# Patient Record
Sex: MALE | Race: WHITE | NOT HISPANIC OR LATINO | Employment: STUDENT | URBAN - METROPOLITAN AREA
[De-identification: names, ages, dates, MRNs, and addresses within clinical notes are randomized per-mention and may not be internally consistent; named-entity substitution may affect disease eponyms.]

---
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
## 2018-02-14 ENCOUNTER — OFFICE VISIT (OUTPATIENT)
Dept: INTERNAL MEDICINE | Facility: CLINIC | Age: 18
End: 2018-02-14
Payer: COMMERCIAL

## 2018-02-14 ENCOUNTER — LAB VISIT (OUTPATIENT)
Dept: LAB | Facility: HOSPITAL | Age: 18
End: 2018-02-14
Attending: FAMILY MEDICINE
Payer: COMMERCIAL

## 2018-02-14 VITALS
HEIGHT: 74 IN | HEART RATE: 73 BPM | SYSTOLIC BLOOD PRESSURE: 118 MMHG | BODY MASS INDEX: 24.58 KG/M2 | TEMPERATURE: 98 F | OXYGEN SATURATION: 99 % | WEIGHT: 191.56 LBS | DIASTOLIC BLOOD PRESSURE: 80 MMHG

## 2018-02-14 DIAGNOSIS — Z00.00 ROUTINE GENERAL MEDICAL EXAMINATION AT A HEALTH CARE FACILITY: Primary | ICD-10-CM

## 2018-02-14 DIAGNOSIS — Z00.00 ROUTINE GENERAL MEDICAL EXAMINATION AT A HEALTH CARE FACILITY: ICD-10-CM

## 2018-02-14 DIAGNOSIS — N50.89 TESTICULAR SWELLING, RIGHT: ICD-10-CM

## 2018-02-14 LAB
ALBUMIN SERPL BCP-MCNC: 4.4 G/DL
ALP SERPL-CCNC: 111 U/L
ALT SERPL W/O P-5'-P-CCNC: 26 U/L
ANION GAP SERPL CALC-SCNC: 8 MMOL/L
AST SERPL-CCNC: 24 U/L
BASOPHILS # BLD AUTO: 0.01 K/UL
BASOPHILS NFR BLD: 0.2 %
BILIRUB SERPL-MCNC: 1.7 MG/DL
BUN SERPL-MCNC: 14 MG/DL
CALCIUM SERPL-MCNC: 9.5 MG/DL
CHLORIDE SERPL-SCNC: 103 MMOL/L
CHOLEST SERPL-MCNC: 202 MG/DL
CHOLEST/HDLC SERPL: 4.2 {RATIO}
CO2 SERPL-SCNC: 28 MMOL/L
CREAT SERPL-MCNC: 0.9 MG/DL
DIFFERENTIAL METHOD: ABNORMAL
EOSINOPHIL # BLD AUTO: 0.1 K/UL
EOSINOPHIL NFR BLD: 1.5 %
ERYTHROCYTE [DISTWIDTH] IN BLOOD BY AUTOMATED COUNT: 12.4 %
EST. GFR  (AFRICAN AMERICAN): >60 ML/MIN/1.73 M^2
EST. GFR  (NON AFRICAN AMERICAN): >60 ML/MIN/1.73 M^2
GLUCOSE SERPL-MCNC: 88 MG/DL
HCT VFR BLD AUTO: 49.5 %
HDLC SERPL-MCNC: 48 MG/DL
HDLC SERPL: 23.8 %
HGB BLD-MCNC: 17.1 G/DL
IMM GRANULOCYTES # BLD AUTO: 0.02 K/UL
IMM GRANULOCYTES NFR BLD AUTO: 0.4 %
LDLC SERPL CALC-MCNC: 137.2 MG/DL
LYMPHOCYTES # BLD AUTO: 1.6 K/UL
LYMPHOCYTES NFR BLD: 30.8 %
MCH RBC QN AUTO: 29.8 PG
MCHC RBC AUTO-ENTMCNC: 34.5 G/DL
MCV RBC AUTO: 86 FL
MONOCYTES # BLD AUTO: 0.5 K/UL
MONOCYTES NFR BLD: 8.6 %
NEUTROPHILS # BLD AUTO: 3.1 K/UL
NEUTROPHILS NFR BLD: 58.5 %
NONHDLC SERPL-MCNC: 154 MG/DL
NRBC BLD-RTO: 0 /100 WBC
PLATELET # BLD AUTO: 201 K/UL
PMV BLD AUTO: 9 FL
POTASSIUM SERPL-SCNC: 4.2 MMOL/L
PROT SERPL-MCNC: 7.4 G/DL
RBC # BLD AUTO: 5.73 M/UL
SODIUM SERPL-SCNC: 139 MMOL/L
TRIGL SERPL-MCNC: 84 MG/DL
WBC # BLD AUTO: 5.23 K/UL

## 2018-02-14 PROCEDURE — 90471 IMMUNIZATION ADMIN: CPT | Mod: S$GLB,,, | Performed by: FAMILY MEDICINE

## 2018-02-14 PROCEDURE — 99385 PREV VISIT NEW AGE 18-39: CPT | Mod: 25,S$GLB,, | Performed by: FAMILY MEDICINE

## 2018-02-14 PROCEDURE — 90686 IIV4 VACC NO PRSV 0.5 ML IM: CPT | Mod: S$GLB,,, | Performed by: FAMILY MEDICINE

## 2018-02-14 PROCEDURE — 85025 COMPLETE CBC W/AUTO DIFF WBC: CPT

## 2018-02-14 PROCEDURE — 99999 PR PBB SHADOW E&M-EST. PATIENT-LVL III: CPT | Mod: PBBFAC,,, | Performed by: FAMILY MEDICINE

## 2018-02-14 PROCEDURE — 80061 LIPID PANEL: CPT

## 2018-02-14 PROCEDURE — 80053 COMPREHEN METABOLIC PANEL: CPT

## 2018-02-14 PROCEDURE — 36415 COLL VENOUS BLD VENIPUNCTURE: CPT | Mod: PO

## 2018-02-14 NOTE — PROGRESS NOTES
"Subjective:       Patient ID: Olvin Posey is a 17 y.o. male.    Chief Complaint: Follow-up    17-year-old male patient new to my clinic here to establish care.  Patient with no significant past medical history reports that he is here for routine annual physicals.  Patient mentioned that he is been noticing a lump to his right testicle, which has not increased in size, no discomfort reported, has been there for the past few months and would like to get checked.   Patient denies of any discomfort with urine or abdominal pain or groin discomfort  Has been staying physically active        Review of Systems   Constitutional: Negative for appetite change and fatigue.   Eyes: Negative for visual disturbance.   Respiratory: Negative for shortness of breath.    Cardiovascular: Negative for chest pain, palpitations and leg swelling.   Gastrointestinal: Negative for abdominal pain, nausea and vomiting.   Genitourinary: Negative for decreased urine volume, difficulty urinating, dysuria, scrotal swelling and testicular pain.   Musculoskeletal: Negative for myalgias.   Skin: Negative for rash.   Neurological: Negative for headaches.   Psychiatric/Behavioral: Negative for sleep disturbance.         /80 (BP Location: Left arm, Patient Position: Sitting)   Pulse 73   Temp 97.6 °F (36.4 °C) (Tympanic)   Ht 6' 2" (1.88 m)   Wt 86.9 kg (191 lb 9.3 oz)   SpO2 99%   BMI 24.60 kg/m²   Objective:      Physical Exam   Constitutional: He is oriented to person, place, and time. He appears well-developed and well-nourished.   HENT:   Head: Normocephalic and atraumatic.   Mouth/Throat: Oropharynx is clear and moist.   Cardiovascular: Normal rate, regular rhythm and normal heart sounds.    No murmur heard.  Pulmonary/Chest: Effort normal and breath sounds normal. He has no wheezes.   Abdominal: Soft. Bowel sounds are normal. There is no tenderness.   Genitourinary:   Genitourinary Comments: Tiny lump noted in the right " testicle on exam today but nontender   Musculoskeletal: He exhibits no edema.   Neurological: He is alert and oriented to person, place, and time.   Skin: Skin is warm and dry. No rash noted.   Psychiatric: He has a normal mood and affect.         Assessment:       1. Routine general medical examination at a health care facility    2. Testicular swelling, right        Plan:   Routine general medical examination at a health care facility  -     CBC auto differential; Future; Expected date: 02/14/2018  -     Comprehensive metabolic panel; Future; Expected date: 02/14/2018  -     Lipid panel; Future; Expected date: 02/14/2018  -     Urinalysis; Future; Expected date: 02/14/2018  Vital signs stable today.  Clinical exam stable.  Will check nonfasting labs today  Encouraged to eat enough*modifications with diet and exercise    Testicular swelling, right  -     US Scrotum And Testicles; Future; Expected date: 02/14/2018  Secondary to lump in the right testicle, will get ultrasound of the scrotum and testicles to take into further evaluation.  If any increase in size or groin discomfort of testicular pain, please discuss further with urology  Patient clinically asymptomatic    Other orders  -     Influenza - Quadrivalent (3 years & older) (PF)  Flu shot given today

## 2018-02-19 ENCOUNTER — HOSPITAL ENCOUNTER (OUTPATIENT)
Dept: RADIOLOGY | Facility: HOSPITAL | Age: 18
Discharge: HOME | End: 2018-02-19
Attending: FAMILY MEDICINE
Payer: COMMERCIAL

## 2018-02-19 ENCOUNTER — TELEPHONE (OUTPATIENT)
Dept: INTERNAL MEDICINE | Facility: CLINIC | Age: 18
End: 2018-02-19

## 2018-02-19 DIAGNOSIS — N50.89 TESTICULAR SWELLING, RIGHT: ICD-10-CM

## 2018-02-19 DIAGNOSIS — N43.3 HYDROCELE, UNSPECIFIED HYDROCELE TYPE: Primary | ICD-10-CM

## 2018-02-19 PROCEDURE — 76870 US EXAM SCROTUM: CPT | Mod: 26,,, | Performed by: RADIOLOGY

## 2018-02-19 PROCEDURE — 76870 US EXAM SCROTUM: CPT | Mod: TC,PO

## 2018-02-19 NOTE — TELEPHONE ENCOUNTER
Scrotum ultrasound shows trace bilateral hydrocele, will refer to urology for further evaluation.  No other acute findings noted

## 2018-03-27 ENCOUNTER — OFFICE VISIT (OUTPATIENT)
Dept: UROLOGY | Facility: CLINIC | Age: 18
End: 2018-03-27
Payer: COMMERCIAL

## 2018-03-27 VITALS
WEIGHT: 193.56 LBS | DIASTOLIC BLOOD PRESSURE: 84 MMHG | BODY MASS INDEX: 24.84 KG/M2 | HEART RATE: 82 BPM | SYSTOLIC BLOOD PRESSURE: 116 MMHG | HEIGHT: 74 IN

## 2018-03-27 DIAGNOSIS — N43.3 HYDROCELE, UNSPECIFIED HYDROCELE TYPE: Primary | ICD-10-CM

## 2018-03-27 LAB
BILIRUB SERPL-MCNC: NORMAL MG/DL
BLOOD URINE, POC: NORMAL
COLOR, POC UA: YELLOW
GLUCOSE UR QL STRIP: NORMAL
KETONES UR QL STRIP: NORMAL
LEUKOCYTE ESTERASE URINE, POC: NORMAL
NITRITE, POC UA: NORMAL
PH, POC UA: 7
PROTEIN, POC: NORMAL
SPECIFIC GRAVITY, POC UA: 1.01
UROBILINOGEN, POC UA: NORMAL

## 2018-03-27 PROCEDURE — 99244 OFF/OP CNSLTJ NEW/EST MOD 40: CPT | Mod: 25,S$GLB,, | Performed by: UROLOGY

## 2018-03-27 PROCEDURE — 99999 PR PBB SHADOW E&M-EST. PATIENT-LVL II: CPT | Mod: PBBFAC,,, | Performed by: UROLOGY

## 2018-03-27 PROCEDURE — 81002 URINALYSIS NONAUTO W/O SCOPE: CPT | Mod: S$GLB,,, | Performed by: UROLOGY

## 2018-03-27 NOTE — PROGRESS NOTES
Chief Complaint: Hydrocele    HPI:   3/27/18: 16 yo man referred by Dr. Verdin for hydrocele.  Has felt this on his right testicle since 6 mo ago, not really changing over that interval.  No pain.  No abd/pelvic pain and no exac/rel factors.  No hematuria.  No urolithiasis.  No urinary bother.  No  history.  Normal sexual function.    Allergies:  Patient has no known allergies.    Medications:  currently has no medications in their medication list.    Review of Systems:  General: No fever, chills, fatigability, or weight loss.  Skin: No rashes, itching, or changes in color or texture of skin.  Chest: Denies MCCLELLAND, cyanosis, wheezing, cough, and sputum production.  Abdomen: Appetite fine. No weight loss. Denies diarrhea, abdominal pain, hematemesis, or blood in stool.  Musculoskeletal: No joint stiffness or swelling. Denies back pain.  : As above.  All other review of systems negative.    PMH:   has no past medical history on file.    PSH:   has a past surgical history that includes Inguinal hernia repair and Adenoidectomy.    FamHx: family history includes Asthma in his mother; Depression in his father; Heart disease in his maternal grandfather and maternal grandmother; Hypertension in his maternal grandfather and maternal grandmother; Seizures in his other; Stroke in his maternal grandfather; Thyroid disease (age of onset: 40) in his mother.    SocHx:  reports that he has never smoked. He has never used smokeless tobacco. He reports that he drinks alcohol. He reports that he uses drugs, including Marijuana.      Physical Exam:  Vitals:    03/27/18 1524   BP: 116/84   Pulse: 82     General: A&Ox3, no apparent distress, no deformities  Neck: No masses, normal thyroid  Lungs: normal inspiration, no use of accessory muscles  Heart: normal pulse, no arrhythmias  Abdomen: Soft, NT, ND, no masses, no hernias, no hepatosplenomegaly  Lymphatic: Neck and groin nodes negative  Skin: The skin is warm and dry. No  jaundice.  Ext: No c/c/e.  : Test desc ann marie, no abnormalities of epididymus. Penis normal, with normal penile and scrotal skin. Meatus normal.     Labs/Studies: Urinalysis performed in clinic, summary: UA normal    Impression/Plan:   1. Reassured.  US and PE normal.  RTC prn.

## 2018-03-27 NOTE — LETTER
March 27, 2018      Sierra Verdin MD  [address] [facility] [address]           [facility]  [address] [place] [address]  [place] [address]  Phone: [phone]  Fax: [phone]          Patient: Olvin Posey   MR Number: [id number]   YOB: 2000   Date of Visit: 3/27/2018       Dear Dr. Sierra Verdin:    Thank you for referring Olvin Posey to me for evaluation. Attached you will find relevant portions of my assessment and plan of care.    If you have questions, please do not hesitate to call me. I look forward to following Olvin Posey along with you.    Sincerely,    Murray Glynn IV, MD    Enclosure  CC:  No Recipients    If you would like to receive this communication electronically, please contact [email or web address] or [phone] to request more information on [employer] access.    For providers and/or their staff who would like to refer a patient to [facility], please contact us through our one-stop-shop provider referral line, [facility], at [phone].    If you feel you have received this communication in error or would no longer like to receive these types of communications, please e-mail [email or web address]

## 2018-06-23 ENCOUNTER — PATIENT MESSAGE (OUTPATIENT)
Dept: UROLOGY | Facility: CLINIC | Age: 18
End: 2018-06-23

## 2018-07-16 ENCOUNTER — PATIENT MESSAGE (OUTPATIENT)
Dept: INTERNAL MEDICINE | Facility: CLINIC | Age: 18
End: 2018-07-16

## 2018-08-03 ENCOUNTER — TELEPHONE (OUTPATIENT)
Dept: INTERNAL MEDICINE | Facility: CLINIC | Age: 18
End: 2018-08-03

## 2018-08-03 NOTE — TELEPHONE ENCOUNTER
Called pt regarding paperwork, no answer but left a message letting him know to call us back if he would like to pick it up or have us fax it. FERDINAND

## 2018-10-17 ENCOUNTER — LAB VISIT (OUTPATIENT)
Dept: LAB | Facility: HOSPITAL | Age: 18
End: 2018-10-17
Attending: FAMILY MEDICINE
Payer: COMMERCIAL

## 2018-10-17 ENCOUNTER — OFFICE VISIT (OUTPATIENT)
Dept: INTERNAL MEDICINE | Facility: CLINIC | Age: 18
End: 2018-10-17
Payer: COMMERCIAL

## 2018-10-17 VITALS
DIASTOLIC BLOOD PRESSURE: 88 MMHG | WEIGHT: 179.44 LBS | HEART RATE: 97 BPM | TEMPERATURE: 98 F | SYSTOLIC BLOOD PRESSURE: 110 MMHG | BODY MASS INDEX: 23.03 KG/M2 | HEIGHT: 74 IN | OXYGEN SATURATION: 99 %

## 2018-10-17 DIAGNOSIS — R21 PERIANAL RASH: Primary | ICD-10-CM

## 2018-10-17 DIAGNOSIS — R21 PERIANAL RASH: ICD-10-CM

## 2018-10-17 DIAGNOSIS — L65.9 HAIR LOSS: ICD-10-CM

## 2018-10-17 PROCEDURE — 85025 COMPLETE CBC W/AUTO DIFF WBC: CPT

## 2018-10-17 PROCEDURE — 99214 OFFICE O/P EST MOD 30 MIN: CPT | Mod: 25,S$GLB,, | Performed by: FAMILY MEDICINE

## 2018-10-17 PROCEDURE — 90686 IIV4 VACC NO PRSV 0.5 ML IM: CPT | Mod: S$GLB,,, | Performed by: FAMILY MEDICINE

## 2018-10-17 PROCEDURE — 84443 ASSAY THYROID STIM HORMONE: CPT

## 2018-10-17 PROCEDURE — 90471 IMMUNIZATION ADMIN: CPT | Mod: S$GLB,,, | Performed by: FAMILY MEDICINE

## 2018-10-17 PROCEDURE — 36415 COLL VENOUS BLD VENIPUNCTURE: CPT | Mod: PO

## 2018-10-17 PROCEDURE — 99999 PR PBB SHADOW E&M-EST. PATIENT-LVL IV: CPT | Mod: PBBFAC,,, | Performed by: FAMILY MEDICINE

## 2018-10-17 PROCEDURE — 3008F BODY MASS INDEX DOCD: CPT | Mod: CPTII,S$GLB,, | Performed by: FAMILY MEDICINE

## 2018-10-17 RX ORDER — MUPIROCIN 20 MG/G
OINTMENT TOPICAL 2 TIMES DAILY
Qty: 15 G | Refills: 0 | Status: SHIPPED | OUTPATIENT
Start: 2018-10-17 | End: 2020-08-06

## 2018-10-17 RX ORDER — TRIAMCINOLONE ACETONIDE 5 MG/G
CREAM TOPICAL 2 TIMES DAILY
Qty: 15 G | Refills: 0 | Status: SHIPPED | OUTPATIENT
Start: 2018-10-17 | End: 2020-08-06

## 2018-10-17 NOTE — PROGRESS NOTES
Subjective:       Patient ID: Olvin Posey is a 18 y.o. male.    Chief Complaint: Rash (bottom) and Hair Loss    18-year-old male patient with no significant past medical history here concerned about rash in his private area, to his buttocks off and on lately for the past 1 year and had STD workup in the past year ago which was.  Normal.  Patient has been sexually active with 1 partner.   Denies any penile discharge, Has been having occasional itching.   Patient also concerned about hair loss lately  Has been using head and shoulders and yomi shampoo  Reports minimal stress at school lately        Rash   This is a chronic problem. The current episode started more than 1 year ago. The problem has been waxing and waning since onset. The affected locations include thegroin, genitalia and right buttock. The rash is characterized by dryness, itchiness and peeling. He was exposed to nothing. Pertinent negatives include no anorexia, congestion, cough, diarrhea, eye pain, facial edema, fatigue, fever, joint pain, nail changes, rhinorrhea, shortness of breath, sore throat or vomiting. Past treatments include anti-itch cream and moisturizer. The treatment provided mild relief. There is no history of allergies, asthma, eczema or varicella.     Review of Systems   Constitutional: Negative for appetite change, fatigue and fever.   HENT: Negative for congestion, rhinorrhea and sore throat.    Eyes: Negative for pain and visual disturbance.   Respiratory: Negative for cough and shortness of breath.    Cardiovascular: Negative for chest pain, palpitations and leg swelling.   Gastrointestinal: Negative for abdominal pain, anorexia, diarrhea, nausea and vomiting.   Genitourinary: Negative for discharge and penile pain.   Musculoskeletal: Negative for joint pain and myalgias.   Skin: Positive for rash. Negative for nail changes.        Hair loss   Neurological: Negative for headaches.   Psychiatric/Behavioral: Negative for  "dysphoric mood and sleep disturbance. The patient is nervous/anxious.          /88 (BP Location: Right arm, Patient Position: Sitting)   Pulse 97   Temp 98.3 °F (36.8 °C) (Tympanic)   Ht 6' 2" (1.88 m)   Wt 81.4 kg (179 lb 7.3 oz)   SpO2 99%   BMI 23.04 kg/m²   Objective:      Physical Exam   Constitutional: He is oriented to person, place, and time. He appears well-developed and well-nourished.   HENT:   Head: Normocephalic and atraumatic.   Mouth/Throat: Oropharynx is clear and moist.   Cardiovascular: Normal rate, regular rhythm and normal heart sounds.   No murmur heard.  Pulmonary/Chest: Effort normal and breath sounds normal. He has no wheezes.   Abdominal: Soft. Bowel sounds are normal. There is no tenderness.   Musculoskeletal: He exhibits no edema.   Neurological: He is alert and oriented to person, place, and time.   Skin: Skin is warm and dry. No rash noted.        Noted erythematous scattered maculopapular rash  to buttocks and perianal area    Noted receding hairline on the right side   Psychiatric: He has a normal mood and affect.         Assessment:       1. Perianal rash    2. Hair loss        Plan:   Perianal rash  -     CBC auto differential; Future; Expected date: 10/17/2018  -     TSH; Future; Expected date: 10/17/2018  -     Ambulatory referral to Dermatology  -     mupirocin (BACTROBAN) 2 % ointment; Apply topically 2 (two) times daily. Apply to anal rash  Dispense: 15 g; Refill: 0  -     triamcinolone acetonide 0.5% (KENALOG) 0.5 % Crea; Apply topically 2 (two) times daily. Apply to anal rash for 10 days  Dispense: 15 g; Refill: 0  Patient was advised to alternate Bactroban and Kenalog cream  Offered STD workup but patient refused at this time, he mentions that he has been worked up by a year ago while he was having this rash which was normal  Will refer to Dermatology  Advised to use unscented soaps and lotions    Hair loss  -     CBC auto differential; Future; Expected date: " 10/17/2018  -     TSH; Future; Expected date: 10/17/2018  -     Ambulatory referral to Dermatology  Will check further labs to rule out endocrine etiology  Likely secondary to stress  Encouraged to start taking biotin supplements and use  sulfate free shampoo  Will refer to Dermatology    Other orders  -     Influenza - Quadrivalent (3 years & older) (PF)  Flu shot given today

## 2018-10-18 LAB
BASOPHILS # BLD AUTO: 0.04 K/UL
BASOPHILS NFR BLD: 0.6 %
DIFFERENTIAL METHOD: ABNORMAL
EOSINOPHIL # BLD AUTO: 0.1 K/UL
EOSINOPHIL NFR BLD: 1.5 %
ERYTHROCYTE [DISTWIDTH] IN BLOOD BY AUTOMATED COUNT: 12.8 %
HCT VFR BLD AUTO: 48.8 %
HGB BLD-MCNC: 16.6 G/DL
IMM GRANULOCYTES # BLD AUTO: 0.29 K/UL
IMM GRANULOCYTES NFR BLD AUTO: 4.7 %
LYMPHOCYTES # BLD AUTO: 1.9 K/UL
LYMPHOCYTES NFR BLD: 30.4 %
MCH RBC QN AUTO: 29.8 PG
MCHC RBC AUTO-ENTMCNC: 34 G/DL
MCV RBC AUTO: 88 FL
MONOCYTES # BLD AUTO: 0.5 K/UL
MONOCYTES NFR BLD: 8.4 %
NEUTROPHILS # BLD AUTO: 3.4 K/UL
NEUTROPHILS NFR BLD: 54.4 %
NRBC BLD-RTO: 0 /100 WBC
PLATELET # BLD AUTO: 195 K/UL
PMV BLD AUTO: 9.3 FL
RBC # BLD AUTO: 5.57 M/UL
TSH SERPL DL<=0.005 MIU/L-ACNC: 1.08 UIU/ML
WBC # BLD AUTO: 6.18 K/UL

## 2018-10-19 ENCOUNTER — INITIAL CONSULT (OUTPATIENT)
Dept: DERMATOLOGY | Facility: CLINIC | Age: 18
End: 2018-10-19
Payer: COMMERCIAL

## 2018-10-19 DIAGNOSIS — L30.4 INTERTRIGO: ICD-10-CM

## 2018-10-19 DIAGNOSIS — R23.8 SCALP IRRITATION: Primary | ICD-10-CM

## 2018-10-19 PROCEDURE — 99999 PR PBB SHADOW E&M-EST. PATIENT-LVL III: CPT | Mod: PBBFAC,,, | Performed by: STUDENT IN AN ORGANIZED HEALTH CARE EDUCATION/TRAINING PROGRAM

## 2018-10-19 PROCEDURE — 99202 OFFICE O/P NEW SF 15 MIN: CPT | Mod: S$GLB,,, | Performed by: STUDENT IN AN ORGANIZED HEALTH CARE EDUCATION/TRAINING PROGRAM

## 2018-10-19 RX ORDER — MICONAZOLE NITRATE 2 %
POWDER (GRAM) TOPICAL
Qty: 85 G | Refills: 0 | Status: SHIPPED | OUTPATIENT
Start: 2018-10-19 | End: 2020-08-06

## 2018-10-19 RX ORDER — KETOCONAZOLE 20 MG/ML
SHAMPOO, SUSPENSION TOPICAL
Qty: 120 ML | Refills: 5 | Status: SHIPPED | OUTPATIENT
Start: 2018-10-19 | End: 2020-08-06

## 2018-10-19 RX ORDER — KETOCONAZOLE 20 MG/G
CREAM TOPICAL 2 TIMES DAILY
Qty: 60 G | Refills: 3 | Status: SHIPPED | OUTPATIENT
Start: 2018-10-19 | End: 2018-11-30 | Stop reason: SDUPTHER

## 2018-10-19 RX ORDER — FLUCONAZOLE 200 MG/1
TABLET ORAL
Qty: 4 TABLET | Refills: 0 | Status: SHIPPED | OUTPATIENT
Start: 2018-10-19 | End: 2020-08-06

## 2018-10-19 RX ORDER — CLOBETASOL PROPIONATE 0.46 MG/ML
SOLUTION TOPICAL 2 TIMES DAILY
Qty: 50 ML | Refills: 2 | Status: SHIPPED | OUTPATIENT
Start: 2018-10-19 | End: 2020-08-06

## 2018-10-19 NOTE — LETTER
October 19, 2018      Sierra Verdin MD  [address] [facility] [place] [address]           [facility]  [address] [facility] [address] [facility] [place] [address]  Phone: [phone]  Fax: [phone]          Patient: Olvin Posey   MR Number: [id number]   YOB: 2000   Date of Visit: 10/19/2018       Dear Dr. Sierra Verdin:    Thank you for referring Olvin Posey to me for evaluation. Attached you will find relevant portions of my assessment and plan of care.    If you have questions, please do not hesitate to call me. I look forward to following Olvin Posey along with you.    Sincerely,    Ramu Ferraro MD    Enclosure  CC:  No Recipients    If you would like to receive this communication electronically, please contact [email or web address] or [phone] to request more information on [employer] Link access.    For providers and/or their staff who would like to refer a patient to [facility], please contact us through our one-stop-shop provider referral line, [facility], at [phone].    If you feel you have received this communication in error or would no longer like to receive these types of communications, please e-mail [email or web address]

## 2018-10-19 NOTE — PROGRESS NOTES
Subjective:       Patient ID:  Olvin Posey is a 18 y.o. male who presents for   Chief Complaint   Patient presents with    Rash     c/o rash to right buttocks x 2 years thats dry and itchy tx mupirocin     Hair/Scalp Problem     c/o hair thinning and hair loss  that itch at times x 8 months no tx      History of Present Illness: The patient presents with chief complaint of rash. On the buttocks it has been present for 2 years and waxes and wanes. He has tried OTC fungal cream. He reports that it is very itchy.   Location: inner buttocks  Duration: 1-2 years  Signs/Symptoms: itch  Prior treatments: tx for rash mupirocin     Patient complains of lesion(s) - hair irritation and thinning  Location: scalp  Duration: months  Symptoms: itching and flaking and thinning. He also subconsciously pulls some hairs out when stressed.   Relieving factors/Previous treatments: He has tried head and shoulders.            Review of Systems   Skin: Positive for itching, rash and dry skin.        Objective:    Physical Exam   Constitutional: He appears well-developed and well-nourished. No distress.   Neurological: He is alert and oriented to person, place, and time. He is not disoriented.   Psychiatric: He has a normal mood and affect.   Skin:   Areas Examined (abnormalities noted in diagram):   Scalp / Hair Palpated and Inspected  Head / Face Inspection Performed  Neck Inspection Performed  Genitals / Buttocks / Groin Inspection Performed  RLE Inspected  LLE Inspection Performed                   Diagram Legend     Erythematous scaling macule/papule c/w actinic keratosis       Vascular papule c/w angioma      Pigmented verrucoid papule/plaque c/w seborrheic keratosis      Yellow umbilicated papule c/w sebaceous hyperplasia      Irregularly shaped tan macule c/w lentigo     1-2 mm smooth white papules consistent with Milia      Movable subcutaneous cyst with punctum c/w epidermal inclusion cyst      Subcutaneous movable  cyst c/w pilar cyst      Firm pink to brown papule c/w dermatofibroma      Pedunculated fleshy papule(s) c/w skin tag(s)      Evenly pigmented macule c/w junctional nevus     Mildly variegated pigmented, slightly irregular-bordered macule c/w mildly atypical nevus      Flesh colored to evenly pigmented papule c/w intradermal nevus       Pink pearly papule/plaque c/w basal cell carcinoma      Erythematous hyperkeratotic cursted plaque c/w SCC      Surgical scar with no sign of skin cancer recurrence      Open and closed comedones      Inflammatory papules and pustules      Verrucoid papule consistent consistent with wart     Erythematous eczematous patches and plaques     Dystrophic onycholytic nail with subungual debris c/w onychomycosis     Umbilicated papule    Erythematous-base heme-crusted tan verrucoid plaque consistent with inflamed seborrheic keratosis     Erythematous Silvery Scaling Plaque c/w Psoriasis     See annotation      Assessment / Plan:        Scalp irritation  -     ketoconazole (NIZORAL) 2 % shampoo; Apply topically 3 (three) times a week. Allow to sit on scalp for 5 mins before rinsing.  Dispense: 120 mL; Refill: 5  -     clobetasol (TEMOVATE) 0.05 % external solution; Apply topically 2 (two) times daily. Apply to scalp only  Dispense: 50 mL; Refill: 2    Intertrigo  -     ketoconazole (NIZORAL) 2 % cream; Apply topically 2 (two) times daily. Apply to groin  Dispense: 60 g; Refill: 3  -     fluconazole (DIFLUCAN) 200 MG Tab; Take 1 tablet by mouth once weekly.  Dispense: 4 tablet; Refill: 0  -     miconazole NITRATE 2 % (ZEASORB AF) 2 % top powder; Apply topically as needed for Itching. Apply to groin  Dispense: 85 g; Refill: 0         Follow-up in about 6 weeks (around 11/30/2018).

## 2018-10-19 NOTE — PATIENT INSTRUCTIONS
Ketoconazole shampoo  What is this medicine?  KETOCONAZOLE (janet toe MEHUL na zole) is an antifungal medicine. It is used to treat certain kinds of fungal or yeast infections.  How should I use this medicine?  This medicine is for external use only. Follow the directions on the prescription label. Wash your hands before and after use. Apply the shampoo to damp skin of the affected area of the skin or scalp. Use enough shampoo to cover the affected area and a wide margin surrounding the affected area. Work the shampoo into a lather and leave on for 5 minutes. Rinse the treated area completely with water. Do not get the shampoo in your eyes. If you do, rinse out with plenty of cool tap water. Finish the full course prescribed by your doctor or health care professional even if you think your condition is better. Do not stop using except on the advice of your doctor or health care professional.  Talk to your pediatrician regarding the use of this medicine in children. Special care may be needed.  What side effects may I notice from receiving this medicine?  Side effects that you should report to your doctor or health care professional as soon as possible:  · allergic reactions like skin rash, itching or hives, swelling of the face, lips, or tongue  · pain, tingling, numbness  Side effects that usually do not require medical attention (report to your doctor or health care professional if they continue or are bothersome):  · dry skin  · hair loss, hair discoloration or abnormal texture  · skin irritation  What may interact with this medicine?  Interactions are not expected. Do not use any other skin products without telling your doctor or health care professional.  What if I miss a dose?  If you miss a dose, use it as soon as you can. If it is almost time for your next dose, use only that dose. Do not use double or extra doses.  Where should I keep my medicine?  Keep out of the reach of children.  Store at room temperature  below 25 degrees C (77 degrees F). Protect from light. Keep container tightly closed. Throw away any unused medicine after the expiration date.  What should I tell my health care provider before I take this medicine?  They need to know if you have any of these conditions:  · an unusual or allergic reaction to ketoconazole, itraconazole, miconazole, sulfites, other foods, dyes or preservatives  · pregnant or trying to get pregnant  · breast-feeding  What should I watch for while using this medicine?  Tell your doctor or health care professional if your symptoms do not begin to improve in 1 to 2 weeks.  If your hair has been permanently waved the shampoo may remove the curl.  NOTE:This sheet is a summary. It may not cover all possible information. If you have questions about this medicine, talk to your doctor, pharmacist, or health care provider. Copyright© 2017 [employer]

## 2018-10-31 ENCOUNTER — PATIENT MESSAGE (OUTPATIENT)
Dept: DERMATOLOGY | Facility: CLINIC | Age: 18
End: 2018-10-31

## 2018-11-30 ENCOUNTER — OFFICE VISIT (OUTPATIENT)
Dept: DERMATOLOGY | Facility: CLINIC | Age: 18
End: 2018-11-30
Payer: COMMERCIAL

## 2018-11-30 DIAGNOSIS — L30.4 INTERTRIGO: Primary | ICD-10-CM

## 2018-11-30 PROCEDURE — 99999 PR PBB SHADOW E&M-EST. PATIENT-LVL II: CPT | Mod: PBBFAC,,, | Performed by: STUDENT IN AN ORGANIZED HEALTH CARE EDUCATION/TRAINING PROGRAM

## 2018-11-30 PROCEDURE — 99212 OFFICE O/P EST SF 10 MIN: CPT | Mod: S$GLB,,, | Performed by: STUDENT IN AN ORGANIZED HEALTH CARE EDUCATION/TRAINING PROGRAM

## 2018-11-30 RX ORDER — KETOCONAZOLE 20 MG/G
CREAM TOPICAL 2 TIMES DAILY
Qty: 60 G | Refills: 3 | Status: SHIPPED | OUTPATIENT
Start: 2018-11-30 | End: 2020-08-06

## 2018-11-30 RX ORDER — HYDROCORTISONE 25 MG/G
CREAM TOPICAL 2 TIMES DAILY
Qty: 28 G | Refills: 2 | Status: SHIPPED | OUTPATIENT
Start: 2018-11-30 | End: 2018-11-30 | Stop reason: SDUPTHER

## 2018-11-30 RX ORDER — KETOCONAZOLE 20 MG/G
CREAM TOPICAL 2 TIMES DAILY
Qty: 60 G | Refills: 3 | Status: SHIPPED | OUTPATIENT
Start: 2018-11-30 | End: 2018-11-30 | Stop reason: SDUPTHER

## 2018-11-30 RX ORDER — HYDROCORTISONE 25 MG/G
CREAM TOPICAL 2 TIMES DAILY
Qty: 28 G | Refills: 2 | Status: SHIPPED | OUTPATIENT
Start: 2018-11-30 | End: 2020-08-06

## 2018-11-30 NOTE — PROGRESS NOTES
Subjective:       Patient ID:  Olvin Posey is a 18 y.o. male who presents for   Chief Complaint   Patient presents with    Rash     c/o rash to buttocks x years thats painful at times but getting better     Hair/Scalp Problem     c/o itching to scalp thats getting better some hair loss still noted     History of Present Illness: The patient presents with chief complaint of rash and hair loss . He was last seen on 10/19/18. He was started on ketoconazole cream and fluconazole for intertrigo of the buttocks and reports improvement in his symptoms, though still complains of having itching. He still reports having hair loss, but no further scalp redness, itching or flaking.   Location: scalp, rash buttocks   Duration: years   Signs/Symptoms: itch   Prior treatments: ketoconazole cream, shampoo, fluconazole.             Review of Systems   Skin: Positive for itching, rash and dry skin.        Objective:    Physical Exam   Constitutional: He appears well-developed and well-nourished. No distress.   Neurological: He is alert and oriented to person, place, and time. He is not disoriented.   Psychiatric: He has a normal mood and affect.   Skin:   Areas Examined (abnormalities noted in diagram):   Scalp / Hair Palpated and Inspected  Head / Face Inspection Performed  Neck Inspection Performed              Diagram Legend     Erythematous scaling macule/papule c/w actinic keratosis       Vascular papule c/w angioma      Pigmented verrucoid papule/plaque c/w seborrheic keratosis      Yellow umbilicated papule c/w sebaceous hyperplasia      Irregularly shaped tan macule c/w lentigo     1-2 mm smooth white papules consistent with Milia      Movable subcutaneous cyst with punctum c/w epidermal inclusion cyst      Subcutaneous movable cyst c/w pilar cyst      Firm pink to brown papule c/w dermatofibroma      Pedunculated fleshy papule(s) c/w skin tag(s)      Evenly pigmented macule c/w junctional nevus     Mildly  variegated pigmented, slightly irregular-bordered macule c/w mildly atypical nevus      Flesh colored to evenly pigmented papule c/w intradermal nevus       Pink pearly papule/plaque c/w basal cell carcinoma      Erythematous hyperkeratotic cursted plaque c/w SCC      Surgical scar with no sign of skin cancer recurrence      Open and closed comedones      Inflammatory papules and pustules      Verrucoid papule consistent consistent with wart     Erythematous eczematous patches and plaques     Dystrophic onycholytic nail with subungual debris c/w onychomycosis     Umbilicated papule    Erythematous-base heme-crusted tan verrucoid plaque consistent with inflamed seborrheic keratosis     Erythematous Silvery Scaling Plaque c/w Psoriasis     See annotation      Assessment / Plan:        Intertrigo - improving. Will add on hydrocortisone to regimen.   -     hydrocortisone 2.5 % cream; Apply topically 2 (two) times daily. Mix with ketoconazole  Dispense: 28 g; Refill: 2  -     ketoconazole (NIZORAL) 2 % cream; Apply topically 2 (two) times daily. 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   - Begin Rogaine/Minoxidil 5% foam           Follow-up in about 3 months (around 2/28/2019).

## 2020-06-25 ENCOUNTER — OFFICE VISIT (OUTPATIENT)
Dept: FAMILY MEDICINE | Facility: CLINIC | Age: 20
End: 2020-06-25
Payer: COMMERCIAL

## 2020-06-25 DIAGNOSIS — L65.9 ALOPECIA OF SCALP: Primary | ICD-10-CM

## 2020-06-25 PROCEDURE — 99213 OFFICE O/P EST LOW 20 MIN: CPT | Mod: 95,,, | Performed by: FAMILY MEDICINE

## 2020-06-25 PROCEDURE — 99213 PR OFFICE/OUTPT VISIT, EST, LEVL III, 20-29 MIN: ICD-10-PCS | Mod: 95,,, | Performed by: FAMILY MEDICINE

## 2020-06-25 RX ORDER — FINASTERIDE 1 MG/1
1 TABLET, FILM COATED ORAL DAILY
Qty: 30 TABLET | Refills: 2 | Status: SHIPPED | OUTPATIENT
Start: 2020-06-25 | End: 2020-07-25

## 2020-06-25 NOTE — PROGRESS NOTES
The patient presents with a several month hx of male pattern hair loss. He was evaluated by derm and ultimately tried topical minoxidil without significant improvement. Prior to that he was treated for topical inflammatory or fungal scalp inflammation without improvement. ROS:  General: No fever or sig wt change  HEENT:No other PND eye pain or dc  Respiratory: No cough wheezing  PE: vital signs noted  HEENT: Normocephalic,with no recent trauma,PERRLA,EOMI,conjunctiva clear    Scalp: Alopecia mal pattern, no scaling redness or other sign of inflammation or infection   MSE: Patient is alert and oriented x4, no plosive speech, agitation,tangential thoughts. No SI.        Impression Alopecia  Plan:Rev lab from 2/18 sl el TB but liver enzymes nl  OK trial finasteride 1mg qd  Reviewed side effects and fu recs  See PCP for further eval and lab fu  Avoid liver toxins eg alcohol, organic pesticides or >3000mg acetaminophen daily                   Answers for HPI/ROS submitted by the patient on 6/25/2020   activity change: No  unexpected weight change: No  neck pain: No  hearing loss: No  rhinorrhea: No  trouble swallowing: No  eye discharge: No  visual disturbance: No  chest tightness: No  wheezing: No  chest pain: No  palpitations: No  blood in stool: No  constipation: No  vomiting: No  diarrhea: No  polydipsia: No  polyuria: No  difficulty urinating: No  urgency: No  hematuria: No  joint swelling: No  arthralgias: No  headaches: No  weakness: No  confusion: No  dysphoric mood: No

## 2020-08-06 ENCOUNTER — LAB VISIT (OUTPATIENT)
Dept: LAB | Facility: HOSPITAL | Age: 20
End: 2020-08-06
Attending: FAMILY MEDICINE
Payer: COMMERCIAL

## 2020-08-06 ENCOUNTER — LAB VISIT (OUTPATIENT)
Dept: LAB | Facility: HOSPITAL | Age: 20
End: 2020-08-06
Payer: COMMERCIAL

## 2020-08-06 ENCOUNTER — OFFICE VISIT (OUTPATIENT)
Dept: INTERNAL MEDICINE | Facility: CLINIC | Age: 20
End: 2020-08-06
Payer: COMMERCIAL

## 2020-08-06 VITALS
WEIGHT: 191.81 LBS | SYSTOLIC BLOOD PRESSURE: 102 MMHG | OXYGEN SATURATION: 97 % | TEMPERATURE: 99 F | DIASTOLIC BLOOD PRESSURE: 78 MMHG | BODY MASS INDEX: 25.42 KG/M2 | HEART RATE: 90 BPM | HEIGHT: 73 IN

## 2020-08-06 DIAGNOSIS — L30.9 DERMATITIS: ICD-10-CM

## 2020-08-06 DIAGNOSIS — L65.9 HAIR LOSS: ICD-10-CM

## 2020-08-06 DIAGNOSIS — Z00.00 ROUTINE GENERAL MEDICAL EXAMINATION AT A HEALTH CARE FACILITY: Primary | ICD-10-CM

## 2020-08-06 DIAGNOSIS — Z00.00 ROUTINE GENERAL MEDICAL EXAMINATION AT A HEALTH CARE FACILITY: ICD-10-CM

## 2020-08-06 LAB
ALBUMIN SERPL BCP-MCNC: 4.7 G/DL (ref 3.5–5.2)
ALP SERPL-CCNC: 78 U/L (ref 55–135)
ALT SERPL W/O P-5'-P-CCNC: 30 U/L (ref 10–44)
ANION GAP SERPL CALC-SCNC: 7 MMOL/L (ref 8–16)
AST SERPL-CCNC: 27 U/L (ref 10–40)
BASOPHILS # BLD AUTO: 0.02 K/UL (ref 0–0.2)
BASOPHILS NFR BLD: 0.4 % (ref 0–1.9)
BILIRUB SERPL-MCNC: 1.1 MG/DL (ref 0.1–1)
BILIRUB UR QL STRIP: NEGATIVE
BUN SERPL-MCNC: 8 MG/DL (ref 6–20)
CALCIUM SERPL-MCNC: 9.5 MG/DL (ref 8.7–10.5)
CHLORIDE SERPL-SCNC: 109 MMOL/L (ref 95–110)
CHOLEST SERPL-MCNC: 208 MG/DL (ref 120–199)
CHOLEST/HDLC SERPL: 3.6 {RATIO} (ref 2–5)
CLARITY UR: CLEAR
CO2 SERPL-SCNC: 25 MMOL/L (ref 23–29)
COLOR UR: YELLOW
CREAT SERPL-MCNC: 1 MG/DL (ref 0.5–1.4)
DIFFERENTIAL METHOD: ABNORMAL
EOSINOPHIL # BLD AUTO: 0.1 K/UL (ref 0–0.5)
EOSINOPHIL NFR BLD: 1.1 % (ref 0–8)
ERYTHROCYTE [DISTWIDTH] IN BLOOD BY AUTOMATED COUNT: 12.4 % (ref 11.5–14.5)
EST. GFR  (AFRICAN AMERICAN): >60 ML/MIN/1.73 M^2
EST. GFR  (NON AFRICAN AMERICAN): >60 ML/MIN/1.73 M^2
FERRITIN SERPL-MCNC: 98 NG/ML (ref 20–300)
GLUCOSE SERPL-MCNC: 96 MG/DL (ref 70–110)
GLUCOSE UR QL STRIP: NEGATIVE
HCT VFR BLD AUTO: 49.7 % (ref 40–54)
HDLC SERPL-MCNC: 58 MG/DL (ref 40–75)
HDLC SERPL: 27.9 % (ref 20–50)
HGB BLD-MCNC: 16.6 G/DL (ref 14–18)
HGB UR QL STRIP: NEGATIVE
IMM GRANULOCYTES # BLD AUTO: 0.01 K/UL (ref 0–0.04)
IMM GRANULOCYTES NFR BLD AUTO: 0.2 % (ref 0–0.5)
IRON SERPL-MCNC: 80 UG/DL (ref 45–160)
KETONES UR QL STRIP: NEGATIVE
LDLC SERPL CALC-MCNC: 138.6 MG/DL (ref 63–159)
LEUKOCYTE ESTERASE UR QL STRIP: NEGATIVE
LYMPHOCYTES # BLD AUTO: 1.4 K/UL (ref 1–4.8)
LYMPHOCYTES NFR BLD: 31.1 % (ref 18–48)
MCH RBC QN AUTO: 29.3 PG (ref 27–31)
MCHC RBC AUTO-ENTMCNC: 33.4 G/DL (ref 32–36)
MCV RBC AUTO: 88 FL (ref 82–98)
MONOCYTES # BLD AUTO: 0.4 K/UL (ref 0.3–1)
MONOCYTES NFR BLD: 8.1 % (ref 4–15)
NEUTROPHILS # BLD AUTO: 2.6 K/UL (ref 1.8–7.7)
NEUTROPHILS NFR BLD: 59.1 % (ref 38–73)
NITRITE UR QL STRIP: NEGATIVE
NONHDLC SERPL-MCNC: 150 MG/DL
NRBC BLD-RTO: 0 /100 WBC
PH UR STRIP: 6 [PH] (ref 5–8)
PLATELET # BLD AUTO: 210 K/UL (ref 150–350)
PMV BLD AUTO: 9 FL (ref 9.2–12.9)
POTASSIUM SERPL-SCNC: 4.9 MMOL/L (ref 3.5–5.1)
PROT SERPL-MCNC: 7.3 G/DL (ref 6–8.4)
PROT UR QL STRIP: NEGATIVE
RBC # BLD AUTO: 5.66 M/UL (ref 4.6–6.2)
SATURATED IRON: 21 % (ref 20–50)
SODIUM SERPL-SCNC: 141 MMOL/L (ref 136–145)
SP GR UR STRIP: 1.01 (ref 1–1.03)
T4 FREE SERPL-MCNC: 1.03 NG/DL (ref 0.71–1.51)
TOTAL IRON BINDING CAPACITY: 383 UG/DL (ref 250–450)
TRANSFERRIN SERPL-MCNC: 259 MG/DL (ref 200–375)
TRIGL SERPL-MCNC: 57 MG/DL (ref 30–150)
TSH SERPL DL<=0.005 MIU/L-ACNC: 0.33 UIU/ML (ref 0.4–4)
URN SPEC COLLECT METH UR: NORMAL
WBC # BLD AUTO: 4.47 K/UL (ref 3.9–12.7)

## 2020-08-06 PROCEDURE — 83540 ASSAY OF IRON: CPT

## 2020-08-06 PROCEDURE — 36415 COLL VENOUS BLD VENIPUNCTURE: CPT

## 2020-08-06 PROCEDURE — 81003 URINALYSIS AUTO W/O SCOPE: CPT

## 2020-08-06 PROCEDURE — 86703 HIV-1/HIV-2 1 RESULT ANTBDY: CPT

## 2020-08-06 PROCEDURE — 99999 PR PBB SHADOW E&M-EST. PATIENT-LVL III: CPT | Mod: PBBFAC,,, | Performed by: FAMILY MEDICINE

## 2020-08-06 PROCEDURE — 80061 LIPID PANEL: CPT

## 2020-08-06 PROCEDURE — 99395 PREV VISIT EST AGE 18-39: CPT | Mod: S$GLB,,, | Performed by: FAMILY MEDICINE

## 2020-08-06 PROCEDURE — 99395 PR PREVENTIVE VISIT,EST,18-39: ICD-10-PCS | Mod: S$GLB,,, | Performed by: FAMILY MEDICINE

## 2020-08-06 PROCEDURE — 84443 ASSAY THYROID STIM HORMONE: CPT

## 2020-08-06 PROCEDURE — 86803 HEPATITIS C AB TEST: CPT

## 2020-08-06 PROCEDURE — 80053 COMPREHEN METABOLIC PANEL: CPT

## 2020-08-06 PROCEDURE — 84439 ASSAY OF FREE THYROXINE: CPT

## 2020-08-06 PROCEDURE — 85025 COMPLETE CBC W/AUTO DIFF WBC: CPT

## 2020-08-06 PROCEDURE — 99999 PR PBB SHADOW E&M-EST. PATIENT-LVL III: ICD-10-PCS | Mod: PBBFAC,,, | Performed by: FAMILY MEDICINE

## 2020-08-06 PROCEDURE — 82728 ASSAY OF FERRITIN: CPT

## 2020-08-06 RX ORDER — NYSTATIN AND TRIAMCINOLONE ACETONIDE 100000; 1 [USP'U]/G; MG/G
CREAM TOPICAL 2 TIMES DAILY
Qty: 60 G | Refills: 0 | Status: ON HOLD | OUTPATIENT
Start: 2020-08-06 | End: 2022-01-03 | Stop reason: HOSPADM

## 2020-08-06 RX ORDER — FINASTERIDE 1 MG/1
1 TABLET, FILM COATED ORAL DAILY
COMMUNITY
Start: 2020-06-25 | End: 2020-10-16 | Stop reason: SDUPTHER

## 2020-08-06 NOTE — PROGRESS NOTES
"Subjective:       Patient ID: Olvin Posey is a 20 y.o. male.    Chief Complaint: Annual Exam    20-year-old male patient with no significant past medical history here for routine annual physicals and reported that patient has started Propecia for the last 1 month secondary to hair loss with receding hairline, prescribed by Dr. Chandler.   Patient would like to see if there are any other alternatives being expensive.   Occasionally has jock itch and reports that patient started to have rash to the right index finger, denies any significant itching but gets worse during summer months  Has been staying physically active with diet and exercise  Reports having difficulty with sleep lately, has been having trouble staying asleep, denies any worsening anxiety but reports that he has moved into a new place and is getting used to    Review of Systems   Constitutional: Negative for activity change, appetite change, fatigue and unexpected weight change.   HENT: Negative for hearing loss, rhinorrhea and trouble swallowing.    Eyes: Negative for discharge and visual disturbance.   Respiratory: Negative for chest tightness, shortness of breath and wheezing.    Cardiovascular: Negative for chest pain, palpitations and leg swelling.   Gastrointestinal: Negative for abdominal pain, blood in stool, constipation, diarrhea, nausea and vomiting.   Endocrine: Negative for polydipsia and polyuria.   Genitourinary: Negative for difficulty urinating, hematuria and urgency.   Musculoskeletal: Negative for arthralgias, joint swelling, myalgias and neck pain.   Skin: Positive for rash.   Neurological: Negative for weakness and headaches.   Psychiatric/Behavioral: Positive for sleep disturbance. Negative for confusion and dysphoric mood.         /78 (BP Location: Left arm, Patient Position: Sitting, BP Method: Large (Manual))   Pulse 90   Temp 98.5 °F (36.9 °C) (Tympanic)   Ht 6' 1" (1.854 m)   Wt 87 kg (191 lb 12.8 oz)   SpO2 " 97%   BMI 25.30 kg/m²   Objective:      Physical Exam  Constitutional:       Appearance: He is well-developed.   HENT:      Head: Normocephalic and atraumatic.   Cardiovascular:      Rate and Rhythm: Normal rate and regular rhythm.      Heart sounds: Normal heart sounds. No murmur.   Pulmonary:      Effort: Pulmonary effort is normal.      Breath sounds: Normal breath sounds. No wheezing.   Abdominal:      General: Bowel sounds are normal.      Palpations: Abdomen is soft.      Tenderness: There is no abdominal tenderness.   Skin:     General: Skin is warm and dry.      Findings: Rash present.      Comments: Positive for macular rash to the right index finger laterally in the palmar area   Neurological:      Mental Status: He is alert and oriented to person, place, and time.   Psychiatric:         Mood and Affect: Mood normal.           Assessment/Plan:   1. Routine general medical examination at a health care facility  - CBC auto differential; Future  - Comprehensive metabolic panel; Future  - Lipid Panel; Future  - TSH; Future  - Urinalysis; Future  - HIV 1/2 Ag/Ab (4th Gen); Future  - Hepatitis C Antibody; Future  Vital signs stable today.  Clinical exam stable  Will check complete fasting labs  Encouraged to continue lifestyle modifications with low-fat and low-cholesterol diet and exercise 30 min daily.  Patient reports that he is up-to-date with tetanus    2. Hair loss  - Iron and TIBC; Future  - Ferritin; Future  Currently on Propecia 1 mg daily followed by Dr. Chandler    3. Dermatitis  - nystatin-triamcinolone (MYCOLOG II) cream; Apply topically 2 (two) times daily. apply to rash to index finger  Dispense: 60 g; Refill: 0   Will do a trial of Mycolog cream

## 2020-08-07 LAB
HCV AB SERPL QL IA: NEGATIVE
HIV 1+2 AB+HIV1 P24 AG SERPL QL IA: NEGATIVE

## 2020-10-12 ENCOUNTER — PATIENT MESSAGE (OUTPATIENT)
Dept: FAMILY MEDICINE | Facility: CLINIC | Age: 20
End: 2020-10-12

## 2020-10-13 ENCOUNTER — PATIENT MESSAGE (OUTPATIENT)
Dept: INTERNAL MEDICINE | Facility: CLINIC | Age: 20
End: 2020-10-13

## 2020-10-13 RX ORDER — FINASTERIDE 1 MG/1
1 TABLET, FILM COATED ORAL DAILY
Status: CANCELLED | OUTPATIENT
Start: 2020-10-13

## 2020-10-15 ENCOUNTER — PATIENT MESSAGE (OUTPATIENT)
Dept: INTERNAL MEDICINE | Facility: CLINIC | Age: 20
End: 2020-10-15

## 2020-10-15 DIAGNOSIS — L65.9 HAIR LOSS: Primary | ICD-10-CM

## 2020-10-16 RX ORDER — FINASTERIDE 1 MG/1
1 TABLET, FILM COATED ORAL DAILY
Qty: 90 TABLET | Refills: 1 | Status: SHIPPED | OUTPATIENT
Start: 2020-10-16 | End: 2021-02-01 | Stop reason: SDUPTHER

## 2021-04-22 ENCOUNTER — PATIENT MESSAGE (OUTPATIENT)
Dept: INTERNAL MEDICINE | Facility: CLINIC | Age: 21
End: 2021-04-22

## 2021-04-28 ENCOUNTER — PATIENT MESSAGE (OUTPATIENT)
Dept: RESEARCH | Facility: HOSPITAL | Age: 21
End: 2021-04-28

## 2021-05-03 ENCOUNTER — TELEPHONE (OUTPATIENT)
Dept: INTERNAL MEDICINE | Facility: CLINIC | Age: 21
End: 2021-05-03

## 2021-05-03 ENCOUNTER — PATIENT MESSAGE (OUTPATIENT)
Dept: INTERNAL MEDICINE | Facility: CLINIC | Age: 21
End: 2021-05-03

## 2021-05-03 DIAGNOSIS — L65.9 HAIR LOSS: Primary | ICD-10-CM

## 2021-08-11 ENCOUNTER — OFFICE VISIT (OUTPATIENT)
Dept: INTERNAL MEDICINE | Facility: CLINIC | Age: 21
End: 2021-08-11
Payer: COMMERCIAL

## 2021-08-11 ENCOUNTER — TELEPHONE (OUTPATIENT)
Dept: INTERNAL MEDICINE | Facility: CLINIC | Age: 21
End: 2021-08-11

## 2021-08-11 ENCOUNTER — LAB VISIT (OUTPATIENT)
Dept: LAB | Facility: HOSPITAL | Age: 21
End: 2021-08-11
Attending: FAMILY MEDICINE
Payer: COMMERCIAL

## 2021-08-11 VITALS
TEMPERATURE: 98 F | WEIGHT: 198.63 LBS | DIASTOLIC BLOOD PRESSURE: 80 MMHG | HEART RATE: 85 BPM | SYSTOLIC BLOOD PRESSURE: 118 MMHG | BODY MASS INDEX: 26.21 KG/M2

## 2021-08-11 DIAGNOSIS — Z29.9 PREVENTIVE MEASURE: ICD-10-CM

## 2021-08-11 DIAGNOSIS — N63.0 LUMP, BREAST: ICD-10-CM

## 2021-08-11 DIAGNOSIS — N63.0 LUMP, BREAST: Primary | ICD-10-CM

## 2021-08-11 LAB
ALBUMIN SERPL BCP-MCNC: 4.8 G/DL (ref 3.5–5.2)
ALP SERPL-CCNC: 71 U/L (ref 55–135)
ALT SERPL W/O P-5'-P-CCNC: 50 U/L (ref 10–44)
ANION GAP SERPL CALC-SCNC: 10 MMOL/L (ref 8–16)
AST SERPL-CCNC: 28 U/L (ref 10–40)
BASOPHILS # BLD AUTO: 0.03 K/UL (ref 0–0.2)
BASOPHILS NFR BLD: 0.5 % (ref 0–1.9)
BILIRUB SERPL-MCNC: 2.3 MG/DL (ref 0.1–1)
BUN SERPL-MCNC: 13 MG/DL (ref 6–20)
CALCIUM SERPL-MCNC: 9.6 MG/DL (ref 8.7–10.5)
CHLORIDE SERPL-SCNC: 102 MMOL/L (ref 95–110)
CO2 SERPL-SCNC: 26 MMOL/L (ref 23–29)
CREAT SERPL-MCNC: 0.9 MG/DL (ref 0.5–1.4)
DIFFERENTIAL METHOD: NORMAL
EOSINOPHIL # BLD AUTO: 0.1 K/UL (ref 0–0.5)
EOSINOPHIL NFR BLD: 1.7 % (ref 0–8)
ERYTHROCYTE [DISTWIDTH] IN BLOOD BY AUTOMATED COUNT: 12.1 % (ref 11.5–14.5)
EST. GFR  (AFRICAN AMERICAN): >60 ML/MIN/1.73 M^2
EST. GFR  (NON AFRICAN AMERICAN): >60 ML/MIN/1.73 M^2
GLUCOSE SERPL-MCNC: 73 MG/DL (ref 70–110)
HCT VFR BLD AUTO: 51 % (ref 40–54)
HGB BLD-MCNC: 17.3 G/DL (ref 14–18)
IMM GRANULOCYTES # BLD AUTO: 0.02 K/UL (ref 0–0.04)
IMM GRANULOCYTES NFR BLD AUTO: 0.3 % (ref 0–0.5)
LYMPHOCYTES # BLD AUTO: 1.9 K/UL (ref 1–4.8)
LYMPHOCYTES NFR BLD: 28.9 % (ref 18–48)
MCH RBC QN AUTO: 29.5 PG (ref 27–31)
MCHC RBC AUTO-ENTMCNC: 33.9 G/DL (ref 32–36)
MCV RBC AUTO: 87 FL (ref 82–98)
MONOCYTES # BLD AUTO: 0.6 K/UL (ref 0.3–1)
MONOCYTES NFR BLD: 9 % (ref 4–15)
NEUTROPHILS # BLD AUTO: 3.9 K/UL (ref 1.8–7.7)
NEUTROPHILS NFR BLD: 59.6 % (ref 38–73)
NRBC BLD-RTO: 0 /100 WBC
PLATELET # BLD AUTO: 209 K/UL (ref 150–450)
PMV BLD AUTO: 9.2 FL (ref 9.2–12.9)
POTASSIUM SERPL-SCNC: 3.8 MMOL/L (ref 3.5–5.1)
PROLACTIN SERPL IA-MCNC: 9.4 NG/ML (ref 3.5–19.4)
PROT SERPL-MCNC: 7.4 G/DL (ref 6–8.4)
RBC # BLD AUTO: 5.87 M/UL (ref 4.6–6.2)
SODIUM SERPL-SCNC: 138 MMOL/L (ref 136–145)
TSH SERPL DL<=0.005 MIU/L-ACNC: 1.25 UIU/ML (ref 0.4–4)
WBC # BLD AUTO: 6.46 K/UL (ref 3.9–12.7)

## 2021-08-11 PROCEDURE — 1126F AMNT PAIN NOTED NONE PRSNT: CPT | Mod: CPTII,S$GLB,, | Performed by: FAMILY MEDICINE

## 2021-08-11 PROCEDURE — 99999 PR PBB SHADOW E&M-EST. PATIENT-LVL IV: CPT | Mod: PBBFAC,,, | Performed by: FAMILY MEDICINE

## 2021-08-11 PROCEDURE — 99214 OFFICE O/P EST MOD 30 MIN: CPT | Mod: S$GLB,,, | Performed by: FAMILY MEDICINE

## 2021-08-11 PROCEDURE — 84402 ASSAY OF FREE TESTOSTERONE: CPT | Performed by: FAMILY MEDICINE

## 2021-08-11 PROCEDURE — 1126F PR PAIN SEVERITY QUANTIFIED, NO PAIN PRESENT: ICD-10-PCS | Mod: CPTII,S$GLB,, | Performed by: FAMILY MEDICINE

## 2021-08-11 PROCEDURE — 84146 ASSAY OF PROLACTIN: CPT | Performed by: FAMILY MEDICINE

## 2021-08-11 PROCEDURE — 99214 PR OFFICE/OUTPT VISIT, EST, LEVL IV, 30-39 MIN: ICD-10-PCS | Mod: S$GLB,,, | Performed by: FAMILY MEDICINE

## 2021-08-11 PROCEDURE — 1159F MED LIST DOCD IN RCRD: CPT | Mod: CPTII,S$GLB,, | Performed by: FAMILY MEDICINE

## 2021-08-11 PROCEDURE — 3079F DIAST BP 80-89 MM HG: CPT | Mod: CPTII,S$GLB,, | Performed by: FAMILY MEDICINE

## 2021-08-11 PROCEDURE — 1159F PR MEDICATION LIST DOCUMENTED IN MEDICAL RECORD: ICD-10-PCS | Mod: CPTII,S$GLB,, | Performed by: FAMILY MEDICINE

## 2021-08-11 PROCEDURE — 3074F SYST BP LT 130 MM HG: CPT | Mod: CPTII,S$GLB,, | Performed by: FAMILY MEDICINE

## 2021-08-11 PROCEDURE — 80053 COMPREHEN METABOLIC PANEL: CPT | Performed by: FAMILY MEDICINE

## 2021-08-11 PROCEDURE — 85025 COMPLETE CBC W/AUTO DIFF WBC: CPT | Performed by: FAMILY MEDICINE

## 2021-08-11 PROCEDURE — 3074F PR MOST RECENT SYSTOLIC BLOOD PRESSURE < 130 MM HG: ICD-10-PCS | Mod: CPTII,S$GLB,, | Performed by: FAMILY MEDICINE

## 2021-08-11 PROCEDURE — 3079F PR MOST RECENT DIASTOLIC BLOOD PRESSURE 80-89 MM HG: ICD-10-PCS | Mod: CPTII,S$GLB,, | Performed by: FAMILY MEDICINE

## 2021-08-11 PROCEDURE — 3008F PR BODY MASS INDEX (BMI) DOCUMENTED: ICD-10-PCS | Mod: CPTII,S$GLB,, | Performed by: FAMILY MEDICINE

## 2021-08-11 PROCEDURE — 84443 ASSAY THYROID STIM HORMONE: CPT | Performed by: FAMILY MEDICINE

## 2021-08-11 PROCEDURE — 3008F BODY MASS INDEX DOCD: CPT | Mod: CPTII,S$GLB,, | Performed by: FAMILY MEDICINE

## 2021-08-11 PROCEDURE — 1160F PR REVIEW ALL MEDS BY PRESCRIBER/CLIN PHARMACIST DOCUMENTED: ICD-10-PCS | Mod: CPTII,S$GLB,, | Performed by: FAMILY MEDICINE

## 2021-08-11 PROCEDURE — 99999 PR PBB SHADOW E&M-EST. PATIENT-LVL IV: ICD-10-PCS | Mod: PBBFAC,,, | Performed by: FAMILY MEDICINE

## 2021-08-11 PROCEDURE — 1160F RVW MEDS BY RX/DR IN RCRD: CPT | Mod: CPTII,S$GLB,, | Performed by: FAMILY MEDICINE

## 2021-08-13 ENCOUNTER — HOSPITAL ENCOUNTER (OUTPATIENT)
Dept: RADIOLOGY | Facility: HOSPITAL | Age: 21
Discharge: HOME | End: 2021-08-13
Attending: FAMILY MEDICINE
Payer: COMMERCIAL

## 2021-08-13 DIAGNOSIS — N63.0 LUMP, BREAST: ICD-10-CM

## 2021-08-13 PROCEDURE — 77062 BREAST TOMOSYNTHESIS BI: CPT | Mod: 26,,, | Performed by: RADIOLOGY

## 2021-08-13 PROCEDURE — 77062 MAMMO DIGITAL DIAGNOSTIC BILAT WITH TOMO: ICD-10-PCS | Mod: 26,,, | Performed by: RADIOLOGY

## 2021-08-13 PROCEDURE — 77066 MAMMO DIGITAL DIAGNOSTIC BILAT WITH TOMO: ICD-10-PCS | Mod: 26,,, | Performed by: RADIOLOGY

## 2021-08-13 PROCEDURE — 76642 US BREAST BILATERAL LIMITED: ICD-10-PCS | Mod: 26,50,, | Performed by: RADIOLOGY

## 2021-08-13 PROCEDURE — 77066 DX MAMMO INCL CAD BI: CPT | Mod: 26,,, | Performed by: RADIOLOGY

## 2021-08-13 PROCEDURE — 76642 ULTRASOUND BREAST LIMITED: CPT | Mod: TC,50

## 2021-08-13 PROCEDURE — 77066 DX MAMMO INCL CAD BI: CPT | Mod: TC

## 2021-08-13 PROCEDURE — 76642 ULTRASOUND BREAST LIMITED: CPT | Mod: 26,50,, | Performed by: RADIOLOGY

## 2021-08-16 LAB — TESTOST FREE SERPL-MCNC: 12.4 PG/ML (ref 5.1–41.5)

## 2021-08-25 ENCOUNTER — LAB VISIT (OUTPATIENT)
Dept: LAB | Facility: HOSPITAL | Age: 21
End: 2021-08-25
Attending: FAMILY MEDICINE
Payer: COMMERCIAL

## 2021-08-25 ENCOUNTER — OFFICE VISIT (OUTPATIENT)
Dept: INTERNAL MEDICINE | Facility: CLINIC | Age: 21
End: 2021-08-25
Payer: COMMERCIAL

## 2021-08-25 VITALS
DIASTOLIC BLOOD PRESSURE: 74 MMHG | OXYGEN SATURATION: 98 % | SYSTOLIC BLOOD PRESSURE: 106 MMHG | BODY MASS INDEX: 26.47 KG/M2 | HEIGHT: 73 IN | RESPIRATION RATE: 16 BRPM | WEIGHT: 199.75 LBS | HEART RATE: 78 BPM | TEMPERATURE: 97 F

## 2021-08-25 DIAGNOSIS — Z00.00 ROUTINE GENERAL MEDICAL EXAMINATION AT A HEALTH CARE FACILITY: ICD-10-CM

## 2021-08-25 DIAGNOSIS — Z00.00 ROUTINE GENERAL MEDICAL EXAMINATION AT A HEALTH CARE FACILITY: Primary | ICD-10-CM

## 2021-08-25 DIAGNOSIS — R74.8 ELEVATED LIVER ENZYMES: ICD-10-CM

## 2021-08-25 LAB
ALBUMIN SERPL BCP-MCNC: 4.6 G/DL (ref 3.5–5.2)
ALP SERPL-CCNC: 70 U/L (ref 55–135)
ALT SERPL W/O P-5'-P-CCNC: 44 U/L (ref 10–44)
AST SERPL-CCNC: 29 U/L (ref 10–40)
BILIRUB DIRECT SERPL-MCNC: 0.4 MG/DL (ref 0.1–0.3)
BILIRUB SERPL-MCNC: 1.4 MG/DL (ref 0.1–1)
CHOLEST SERPL-MCNC: 204 MG/DL (ref 120–199)
CHOLEST/HDLC SERPL: 4.1 {RATIO} (ref 2–5)
HDLC SERPL-MCNC: 50 MG/DL (ref 40–75)
HDLC SERPL: 24.5 % (ref 20–50)
LDLC SERPL CALC-MCNC: 140.2 MG/DL (ref 63–159)
NONHDLC SERPL-MCNC: 154 MG/DL
PROT SERPL-MCNC: 6.8 G/DL (ref 6–8.4)
TRIGL SERPL-MCNC: 69 MG/DL (ref 30–150)

## 2021-08-25 PROCEDURE — 99999 PR PBB SHADOW E&M-EST. PATIENT-LVL III: ICD-10-PCS | Mod: PBBFAC,,, | Performed by: FAMILY MEDICINE

## 2021-08-25 PROCEDURE — 1160F PR REVIEW ALL MEDS BY PRESCRIBER/CLIN PHARMACIST DOCUMENTED: ICD-10-PCS | Mod: CPTII,S$GLB,, | Performed by: FAMILY MEDICINE

## 2021-08-25 PROCEDURE — 99395 PR PREVENTIVE VISIT,EST,18-39: ICD-10-PCS | Mod: S$GLB,,, | Performed by: FAMILY MEDICINE

## 2021-08-25 PROCEDURE — 99395 PREV VISIT EST AGE 18-39: CPT | Mod: S$GLB,,, | Performed by: FAMILY MEDICINE

## 2021-08-25 PROCEDURE — 80076 HEPATIC FUNCTION PANEL: CPT | Performed by: FAMILY MEDICINE

## 2021-08-25 PROCEDURE — 3078F DIAST BP <80 MM HG: CPT | Mod: CPTII,S$GLB,, | Performed by: FAMILY MEDICINE

## 2021-08-25 PROCEDURE — 1159F PR MEDICATION LIST DOCUMENTED IN MEDICAL RECORD: ICD-10-PCS | Mod: CPTII,S$GLB,, | Performed by: FAMILY MEDICINE

## 2021-08-25 PROCEDURE — 99999 PR PBB SHADOW E&M-EST. PATIENT-LVL III: CPT | Mod: PBBFAC,,, | Performed by: FAMILY MEDICINE

## 2021-08-25 PROCEDURE — 3074F SYST BP LT 130 MM HG: CPT | Mod: CPTII,S$GLB,, | Performed by: FAMILY MEDICINE

## 2021-08-25 PROCEDURE — 3008F BODY MASS INDEX DOCD: CPT | Mod: CPTII,S$GLB,, | Performed by: FAMILY MEDICINE

## 2021-08-25 PROCEDURE — 86704 HEP B CORE ANTIBODY TOTAL: CPT | Performed by: FAMILY MEDICINE

## 2021-08-25 PROCEDURE — 1160F RVW MEDS BY RX/DR IN RCRD: CPT | Mod: CPTII,S$GLB,, | Performed by: FAMILY MEDICINE

## 2021-08-25 PROCEDURE — 36415 COLL VENOUS BLD VENIPUNCTURE: CPT | Performed by: FAMILY MEDICINE

## 2021-08-25 PROCEDURE — 1159F MED LIST DOCD IN RCRD: CPT | Mod: CPTII,S$GLB,, | Performed by: FAMILY MEDICINE

## 2021-08-25 PROCEDURE — 3008F PR BODY MASS INDEX (BMI) DOCUMENTED: ICD-10-PCS | Mod: CPTII,S$GLB,, | Performed by: FAMILY MEDICINE

## 2021-08-25 PROCEDURE — 3074F PR MOST RECENT SYSTOLIC BLOOD PRESSURE < 130 MM HG: ICD-10-PCS | Mod: CPTII,S$GLB,, | Performed by: FAMILY MEDICINE

## 2021-08-25 PROCEDURE — 3078F PR MOST RECENT DIASTOLIC BLOOD PRESSURE < 80 MM HG: ICD-10-PCS | Mod: CPTII,S$GLB,, | Performed by: FAMILY MEDICINE

## 2021-08-25 PROCEDURE — 87340 HEPATITIS B SURFACE AG IA: CPT | Performed by: FAMILY MEDICINE

## 2021-08-25 PROCEDURE — 80061 LIPID PANEL: CPT | Performed by: FAMILY MEDICINE

## 2021-08-26 LAB
HBV CORE AB SERPL QL IA: NEGATIVE
HBV SURFACE AG SERPL QL IA: NEGATIVE

## 2021-12-20 ENCOUNTER — HOSPITAL ENCOUNTER (EMERGENCY)
Facility: HOSPITAL | Age: 21
Discharge: PSYCHIATRIC OR SUBSTANCE USE FACILITY | End: 2021-12-21
Attending: EMERGENCY MEDICINE
Payer: COMMERCIAL

## 2021-12-20 DIAGNOSIS — Z00.8 MEDICAL CLEARANCE FOR PSYCHIATRIC ADMISSION: Primary | ICD-10-CM

## 2021-12-20 LAB
ALBUMIN SERPL BCP-MCNC: 4.8 G/DL (ref 3.5–5.2)
ALP SERPL-CCNC: 79 U/L (ref 55–135)
ALT SERPL W/O P-5'-P-CCNC: 31 U/L (ref 10–44)
AMPHET+METHAMPHET UR QL: NEGATIVE
ANION GAP SERPL CALC-SCNC: 11 MMOL/L (ref 8–16)
APAP SERPL-MCNC: <3 UG/ML (ref 10–20)
AST SERPL-CCNC: 23 U/L (ref 10–40)
BARBITURATES UR QL SCN>200 NG/ML: NEGATIVE
BASOPHILS # BLD AUTO: 0.02 K/UL (ref 0–0.2)
BASOPHILS NFR BLD: 0.2 % (ref 0–1.9)
BENZODIAZ UR QL SCN>200 NG/ML: NEGATIVE
BILIRUB SERPL-MCNC: 1.7 MG/DL (ref 0.1–1)
BILIRUB UR QL STRIP: NEGATIVE
BUN SERPL-MCNC: 7 MG/DL (ref 6–20)
BZE UR QL SCN: NEGATIVE
CALCIUM SERPL-MCNC: 9.3 MG/DL (ref 8.7–10.5)
CANNABINOIDS UR QL SCN: NEGATIVE
CHLORIDE SERPL-SCNC: 107 MMOL/L (ref 95–110)
CLARITY UR: CLEAR
CO2 SERPL-SCNC: 25 MMOL/L (ref 23–29)
COLOR UR: YELLOW
CREAT SERPL-MCNC: 1 MG/DL (ref 0.5–1.4)
CREAT UR-MCNC: 120.2 MG/DL (ref 23–375)
CTP QC/QA: YES
DIFFERENTIAL METHOD: ABNORMAL
EOSINOPHIL # BLD AUTO: 0 K/UL (ref 0–0.5)
EOSINOPHIL NFR BLD: 0.2 % (ref 0–8)
ERYTHROCYTE [DISTWIDTH] IN BLOOD BY AUTOMATED COUNT: 12 % (ref 11.5–14.5)
EST. GFR  (AFRICAN AMERICAN): >60 ML/MIN/1.73 M^2
EST. GFR  (NON AFRICAN AMERICAN): >60 ML/MIN/1.73 M^2
ETHANOL SERPL-MCNC: <10 MG/DL
GLUCOSE SERPL-MCNC: 133 MG/DL (ref 70–110)
GLUCOSE UR QL STRIP: NEGATIVE
HCT VFR BLD AUTO: 47.4 % (ref 40–54)
HGB BLD-MCNC: 16.5 G/DL (ref 14–18)
HGB UR QL STRIP: NEGATIVE
IMM GRANULOCYTES # BLD AUTO: 0.04 K/UL (ref 0–0.04)
IMM GRANULOCYTES NFR BLD AUTO: 0.5 % (ref 0–0.5)
KETONES UR QL STRIP: ABNORMAL
LEUKOCYTE ESTERASE UR QL STRIP: NEGATIVE
LYMPHOCYTES # BLD AUTO: 1.2 K/UL (ref 1–4.8)
LYMPHOCYTES NFR BLD: 14.1 % (ref 18–48)
MCH RBC QN AUTO: 29.4 PG (ref 27–31)
MCHC RBC AUTO-ENTMCNC: 34.8 G/DL (ref 32–36)
MCV RBC AUTO: 85 FL (ref 82–98)
METHADONE UR QL SCN>300 NG/ML: NEGATIVE
MONOCYTES # BLD AUTO: 0.6 K/UL (ref 0.3–1)
MONOCYTES NFR BLD: 6.5 % (ref 4–15)
NEUTROPHILS # BLD AUTO: 6.8 K/UL (ref 1.8–7.7)
NEUTROPHILS NFR BLD: 78.5 % (ref 38–73)
NITRITE UR QL STRIP: NEGATIVE
NRBC BLD-RTO: 0 /100 WBC
OPIATES UR QL SCN: NEGATIVE
PCP UR QL SCN>25 NG/ML: NEGATIVE
PH UR STRIP: 6 [PH] (ref 5–8)
PLATELET # BLD AUTO: 224 K/UL (ref 150–450)
PMV BLD AUTO: 8.5 FL (ref 9.2–12.9)
POTASSIUM SERPL-SCNC: 3.4 MMOL/L (ref 3.5–5.1)
PROT SERPL-MCNC: 7.2 G/DL (ref 6–8.4)
PROT UR QL STRIP: NEGATIVE
RBC # BLD AUTO: 5.61 M/UL (ref 4.6–6.2)
SALICYLATES SERPL-MCNC: <5 MG/DL (ref 15–30)
SARS-COV-2 RDRP RESP QL NAA+PROBE: NEGATIVE
SODIUM SERPL-SCNC: 143 MMOL/L (ref 136–145)
SP GR UR STRIP: 1.01 (ref 1–1.03)
TOXICOLOGY INFORMATION: NORMAL
TSH SERPL DL<=0.005 MIU/L-ACNC: 1.02 UIU/ML (ref 0.4–4)
URN SPEC COLLECT METH UR: ABNORMAL
UROBILINOGEN UR STRIP-ACNC: NEGATIVE EU/DL
WBC # BLD AUTO: 8.67 K/UL (ref 3.9–12.7)

## 2021-12-20 PROCEDURE — 96372 THER/PROPH/DIAG INJ SC/IM: CPT

## 2021-12-20 PROCEDURE — 84443 ASSAY THYROID STIM HORMONE: CPT | Performed by: EMERGENCY MEDICINE

## 2021-12-20 PROCEDURE — 93010 ELECTROCARDIOGRAM REPORT: CPT | Mod: ,,, | Performed by: INTERNAL MEDICINE

## 2021-12-20 PROCEDURE — 80053 COMPREHEN METABOLIC PANEL: CPT | Performed by: EMERGENCY MEDICINE

## 2021-12-20 PROCEDURE — U0002 COVID-19 LAB TEST NON-CDC: HCPCS | Performed by: EMERGENCY MEDICINE

## 2021-12-20 PROCEDURE — 80179 DRUG ASSAY SALICYLATE: CPT | Performed by: EMERGENCY MEDICINE

## 2021-12-20 PROCEDURE — 81003 URINALYSIS AUTO W/O SCOPE: CPT | Mod: 59 | Performed by: EMERGENCY MEDICINE

## 2021-12-20 PROCEDURE — 80143 DRUG ASSAY ACETAMINOPHEN: CPT | Performed by: EMERGENCY MEDICINE

## 2021-12-20 PROCEDURE — 93010 EKG 12-LEAD: ICD-10-PCS | Mod: ,,, | Performed by: INTERNAL MEDICINE

## 2021-12-20 PROCEDURE — 82077 ASSAY SPEC XCP UR&BREATH IA: CPT | Performed by: EMERGENCY MEDICINE

## 2021-12-20 PROCEDURE — 85025 COMPLETE CBC W/AUTO DIFF WBC: CPT | Performed by: EMERGENCY MEDICINE

## 2021-12-20 PROCEDURE — 99285 EMERGENCY DEPT VISIT HI MDM: CPT | Mod: 25

## 2021-12-20 PROCEDURE — 80307 DRUG TEST PRSMV CHEM ANLYZR: CPT | Performed by: EMERGENCY MEDICINE

## 2021-12-20 PROCEDURE — 93005 ELECTROCARDIOGRAM TRACING: CPT

## 2021-12-20 RX ORDER — POTASSIUM CHLORIDE 20 MEQ/1
40 TABLET, EXTENDED RELEASE ORAL
Status: COMPLETED | OUTPATIENT
Start: 2021-12-21 | End: 2021-12-21

## 2021-12-21 ENCOUNTER — HOSPITAL ENCOUNTER (INPATIENT)
Facility: HOSPITAL | Age: 21
LOS: 19 days | Discharge: HOME | DRG: 885 | End: 2022-01-09
Attending: PSYCHIATRY & NEUROLOGY | Admitting: PSYCHIATRY & NEUROLOGY
Payer: COMMERCIAL

## 2021-12-21 VITALS
BODY MASS INDEX: 23.74 KG/M2 | RESPIRATION RATE: 18 BRPM | HEART RATE: 82 BPM | SYSTOLIC BLOOD PRESSURE: 132 MMHG | HEIGHT: 74 IN | WEIGHT: 185 LBS | OXYGEN SATURATION: 100 % | DIASTOLIC BLOOD PRESSURE: 78 MMHG | TEMPERATURE: 98 F

## 2021-12-21 DIAGNOSIS — F32.A DEPRESSION WITH SUICIDAL IDEATION: ICD-10-CM

## 2021-12-21 DIAGNOSIS — F33.3 MDD (MAJOR DEPRESSIVE DISORDER), RECURRENT, SEVERE, WITH PSYCHOSIS: Primary | ICD-10-CM

## 2021-12-21 DIAGNOSIS — R45.851 DEPRESSION WITH SUICIDAL IDEATION: ICD-10-CM

## 2021-12-21 PROCEDURE — 90833 PSYTX W PT W E/M 30 MIN: CPT | Mod: ,,, | Performed by: PSYCHIATRY & NEUROLOGY

## 2021-12-21 PROCEDURE — 99223 1ST HOSP IP/OBS HIGH 75: CPT | Mod: ,,, | Performed by: PSYCHIATRY & NEUROLOGY

## 2021-12-21 PROCEDURE — 11400000 HC PSYCH PRIVATE ROOM

## 2021-12-21 PROCEDURE — 90833 PR PSYCHOTHERAPY W/PATIENT W/E&M, 30 MIN (ADD ON): ICD-10-PCS | Mod: ,,, | Performed by: PSYCHIATRY & NEUROLOGY

## 2021-12-21 PROCEDURE — 99223 PR INITIAL HOSPITAL CARE,LEVL III: ICD-10-PCS | Mod: ,,, | Performed by: PSYCHIATRY & NEUROLOGY

## 2021-12-21 PROCEDURE — 25000003 PHARM REV CODE 250: Performed by: EMERGENCY MEDICINE

## 2021-12-21 PROCEDURE — S0166 INJ OLANZAPINE 2.5MG: HCPCS | Performed by: EMERGENCY MEDICINE

## 2021-12-21 PROCEDURE — S0166 INJ OLANZAPINE 2.5MG: HCPCS | Performed by: PSYCHIATRY & NEUROLOGY

## 2021-12-21 PROCEDURE — 25000003 PHARM REV CODE 250: Performed by: PSYCHIATRY & NEUROLOGY

## 2021-12-21 RX ORDER — ESCITALOPRAM OXALATE 5 MG/1
5 TABLET ORAL DAILY
Status: DISCONTINUED | OUTPATIENT
Start: 2021-12-21 | End: 2021-12-24

## 2021-12-21 RX ORDER — HYDROXYZINE PAMOATE 50 MG/1
50 CAPSULE ORAL EVERY 6 HOURS PRN
Status: DISCONTINUED | OUTPATIENT
Start: 2021-12-21 | End: 2022-01-04

## 2021-12-21 RX ORDER — OLANZAPINE 5 MG/1
5 TABLET, ORALLY DISINTEGRATING ORAL NIGHTLY
Status: DISCONTINUED | OUTPATIENT
Start: 2021-12-21 | End: 2021-12-21 | Stop reason: HOSPADM

## 2021-12-21 RX ORDER — MAG HYDROX/ALUMINUM HYD/SIMETH 200-200-20
30 SUSPENSION, ORAL (FINAL DOSE FORM) ORAL EVERY 6 HOURS PRN
Status: DISCONTINUED | OUTPATIENT
Start: 2021-12-21 | End: 2022-01-09 | Stop reason: HOSPADM

## 2021-12-21 RX ORDER — OLANZAPINE 10 MG/1
10 TABLET ORAL EVERY 6 HOURS PRN
Status: CANCELLED | OUTPATIENT
Start: 2021-12-21

## 2021-12-21 RX ORDER — DOCUSATE SODIUM 100 MG/1
100 CAPSULE, LIQUID FILLED ORAL DAILY PRN
Status: DISCONTINUED | OUTPATIENT
Start: 2021-12-21 | End: 2021-12-31

## 2021-12-21 RX ORDER — OLANZAPINE 10 MG/2ML
10 INJECTION, POWDER, FOR SOLUTION INTRAMUSCULAR EVERY 6 HOURS PRN
Status: CANCELLED | OUTPATIENT
Start: 2021-12-21

## 2021-12-21 RX ORDER — OLANZAPINE 10 MG/2ML
5 INJECTION, POWDER, FOR SOLUTION INTRAMUSCULAR EVERY 4 HOURS PRN
Status: DISCONTINUED | OUTPATIENT
Start: 2021-12-21 | End: 2021-12-21 | Stop reason: HOSPADM

## 2021-12-21 RX ORDER — LOPERAMIDE HYDROCHLORIDE 2 MG/1
2 CAPSULE ORAL
Status: DISCONTINUED | OUTPATIENT
Start: 2021-12-21 | End: 2022-01-09 | Stop reason: HOSPADM

## 2021-12-21 RX ORDER — OLANZAPINE 10 MG/1
10 TABLET ORAL EVERY 4 HOURS PRN
Status: DISCONTINUED | OUTPATIENT
Start: 2021-12-21 | End: 2022-01-09 | Stop reason: HOSPADM

## 2021-12-21 RX ORDER — HYDROXYZINE PAMOATE 50 MG/1
50 CAPSULE ORAL EVERY 6 HOURS PRN
Status: CANCELLED | OUTPATIENT
Start: 2021-12-21

## 2021-12-21 RX ORDER — FOLIC ACID 1 MG/1
1 TABLET ORAL DAILY
Status: CANCELLED | OUTPATIENT
Start: 2021-12-21

## 2021-12-21 RX ORDER — ACETAMINOPHEN 325 MG/1
650 TABLET ORAL EVERY 6 HOURS PRN
Status: DISCONTINUED | OUTPATIENT
Start: 2021-12-21 | End: 2022-01-09 | Stop reason: HOSPADM

## 2021-12-21 RX ORDER — ARIPIPRAZOLE 2 MG/1
2 TABLET ORAL DAILY
Status: DISCONTINUED | OUTPATIENT
Start: 2021-12-21 | End: 2021-12-22

## 2021-12-21 RX ORDER — IBUPROFEN 200 MG
1 TABLET ORAL DAILY PRN
Status: DISCONTINUED | OUTPATIENT
Start: 2021-12-21 | End: 2022-01-09 | Stop reason: HOSPADM

## 2021-12-21 RX ORDER — OLANZAPINE 10 MG/2ML
10 INJECTION, POWDER, FOR SOLUTION INTRAMUSCULAR EVERY 4 HOURS PRN
Status: DISCONTINUED | OUTPATIENT
Start: 2021-12-21 | End: 2022-01-09 | Stop reason: HOSPADM

## 2021-12-21 RX ORDER — FOLIC ACID 1 MG/1
1 TABLET ORAL DAILY
Status: DISCONTINUED | OUTPATIENT
Start: 2021-12-21 | End: 2022-01-09 | Stop reason: HOSPADM

## 2021-12-21 RX ORDER — IBUPROFEN 200 MG
1 TABLET ORAL DAILY
Status: CANCELLED | OUTPATIENT
Start: 2021-12-21

## 2021-12-21 RX ADMIN — POTASSIUM CHLORIDE 40 MEQ: 1500 TABLET, EXTENDED RELEASE ORAL at 12:12

## 2021-12-21 RX ADMIN — THERA TABS 1 TABLET: TAB at 11:12

## 2021-12-21 RX ADMIN — ESCITALOPRAM OXALATE 5 MG: 5 TABLET, FILM COATED ORAL at 11:12

## 2021-12-21 RX ADMIN — FOLIC ACID 1 MG: 1 TABLET ORAL at 11:12

## 2021-12-21 RX ADMIN — OLANZAPINE 10 MG: 10 INJECTION, POWDER, LYOPHILIZED, FOR SOLUTION INTRAMUSCULAR at 07:12

## 2021-12-21 RX ADMIN — ARIPIPRAZOLE 2 MG: 2 TABLET ORAL at 11:12

## 2021-12-21 RX ADMIN — OLANZAPINE 5 MG: 5 TABLET, ORALLY DISINTEGRATING ORAL at 12:12

## 2021-12-21 RX ADMIN — OLANZAPINE 5 MG: 10 INJECTION, POWDER, FOR SOLUTION INTRAMUSCULAR at 04:12

## 2021-12-21 NOTE — PLAN OF CARE
Recommendation:   1. Continue regular diet as ordered -encourage adequate intake of meals   2. RD to monitor    Goals: pt to consume at least 75% of meals by f/u  Nutrition Goal Status: new

## 2021-12-21 NOTE — PLAN OF CARE
"Admit Note:  Patient received from [facility]. Pt arrived to unit escorted by [employer] transportation and security via wheelchair in stable condition. Pt wanded for security and ambulatory on unit. Skin assessment/body search completed, with negative findings. VSS. Pt calm and cooperative during admission assessment. Pt has a flat affect, response lag and hesitant to answer questions. Pt also paranoid and looking up at ceiling at times. Pt states reason for admit " I lied to my family and myself." Reports this being his first psych admit. Rates depression 0/10 and anxiety 3/10. Denies drug and tobacco use. Admits to drinking occasionally. Denies SI, HI and AVH. Verbally contracts for safety. Instructed patient to verbalizes any needs, discomforts, fears or concerns. Explained rules of unit to patient. Pt refused to signed paperwork. Oriented patient to unit and room. NAD. Will continue to monitor for safety.  "

## 2021-12-21 NOTE — CONSULTS
"[facility] - Behavior[facility]  Adult Nutrition  Consult Note    SUMMARY     Recommendations    Recommendation:   1. Continue regular diet as ordered -encourage adequate intake of meals   2. RD to monitor    Goals: pt to consume at least 75% of meals by f/u  Nutrition Goal Status: new  Communication of RD Recs:  (POC)    Assessment and Plan  Nutrition Problem:  Inadequate energy intake    Related to (etiology):   psychosis    Signs and Symptoms (as evidenced by):   Consuming 50% of meals     Interventions:  General Healthful Diet    Nutrition Diagnosis Status:   New      Reason for Assessment    Reason For Assessment: consult  Diagnosis: psychological disorder  Relevant Medical History: No past medical history on file.  General Information Comments: Pt admitted today. 50% intake of lunch. 9lb wt loss noted since august. NFPE not completed per psych status.  Nutrition Discharge Planning: regular diet    Nutrition Risk Screen    Nutrition Risk Screen: no indicators present    Nutrition/Diet History    Food Allergies: NKFA    Anthropometrics    Temp: 98.2 °F (36.8 °C)  Height: 6' 4" (193 cm)  Height (inches): 76 in  Weight Method: Standard Scale  Weight: 86.1 kg (189 lb 14.8 oz)  Weight (lb): 189.93 lb  Ideal Body Weight (IBW), Male: 202 lb  % Ideal Body Weight, Male (lb): 94.02 %  BMI (Calculated): 23.1  BMI Grade: 18.5-24.9 - normal       Lab/Procedures/Meds    Pertinent Labs Reviewed: reviewed  Pertinent Labs Comments: potassium 3.4, glucose 133, bilirubin 1.7  Pertinent Medications Reviewed: reviewed  Pertinent Medications Comments: aripiprazole, folic acid, MVI    Estimated/Assessed Needs    Weight Used For Calorie Calculations: 86.1 kg (189 lb 13.1 oz)  Energy Calorie Requirements (kcal): 4351-6105  Energy Need Method: Kcal/kg (25-30)  Protein Requirements: 69-86 g  Weight Used For Protein Calculations: 86.1 kg (189 lb 13.1 oz)     Estimated Fluid Requirement Method: RDA Method  RDA Method (mL): 2152   "       Nutrition Prescription Ordered    Current Diet Order: Regular diet    Evaluation of Received Nutrient/Fluid Intake    Fluid Required: meeting needs  % Intake of Estimated Energy Needs: 25 - 50 %  % Meal Intake: 25 - 50 %    Nutrition Risk    Level of Risk/Frequency of Follow-up:  (1x/wk)       Monitor and Evaluation    Food and Nutrient Intake: food and beverage intake,energy intake  Anthropometric Measurements: weight,weight change,body mass index  Biochemical Data, Medical Tests and Procedures: electrolyte and renal panel,glucose/endocrine profile  Nutrition-Focused Physical Findings: overall appearance       Nutrition Follow-Up    RD Follow-up?: Yes
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
## 2021-12-21 NOTE — PROGRESS NOTES
12/21/21 1045   [facility] Group Therapy   Group Name Therapeutic Recreation   Specific Interventions Cognitive Stimulation Training   Participation Level Minimal   Participation Quality Requires Prompting;Lack of Interest   Insight/Motivation Limited   Affect/Mood Display Anxious;Flat   Cognition Alert;Other (see comments)   Psychomotor WNL   Patient presents anxious, flat, paranoid, distracted, starring, slow to respond, sits unless directed into the activity, after group patient asked a female what her name was and asked her if she wanted to talk, female patient said no and was uncomfortable around the patient.
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
## 2021-12-22 PROBLEM — F22 PARANOID: Status: ACTIVE | Noted: 2021-12-22

## 2021-12-22 LAB
ESTIMATED AVG GLUCOSE: 94 MG/DL (ref 68–131)
HBA1C MFR BLD: 4.9 % (ref 4–5.6)

## 2021-12-22 PROCEDURE — 25000003 PHARM REV CODE 250: Performed by: PSYCHIATRY & NEUROLOGY

## 2021-12-22 PROCEDURE — 99233 PR SUBSEQUENT HOSPITAL CARE,LEVL III: ICD-10-PCS | Mod: ,,, | Performed by: PSYCHIATRY & NEUROLOGY

## 2021-12-22 PROCEDURE — 90833 PR PSYCHOTHERAPY W/PATIENT W/E&M, 30 MIN (ADD ON): ICD-10-PCS | Mod: ,,, | Performed by: PSYCHIATRY & NEUROLOGY

## 2021-12-22 PROCEDURE — 99233 SBSQ HOSP IP/OBS HIGH 50: CPT | Mod: ,,, | Performed by: PSYCHIATRY & NEUROLOGY

## 2021-12-22 PROCEDURE — S0166 INJ OLANZAPINE 2.5MG: HCPCS | Performed by: PSYCHIATRY & NEUROLOGY

## 2021-12-22 PROCEDURE — 99232 SBSQ HOSP IP/OBS MODERATE 35: CPT | Mod: ,,, | Performed by: NURSE PRACTITIONER

## 2021-12-22 PROCEDURE — 11400000 HC PSYCH PRIVATE ROOM

## 2021-12-22 PROCEDURE — 36415 COLL VENOUS BLD VENIPUNCTURE: CPT | Performed by: PSYCHIATRY & NEUROLOGY

## 2021-12-22 PROCEDURE — 90833 PSYTX W PT W E/M 30 MIN: CPT | Mod: ,,, | Performed by: PSYCHIATRY & NEUROLOGY

## 2021-12-22 PROCEDURE — 99232 PR SUBSEQUENT HOSPITAL CARE,LEVL II: ICD-10-PCS | Mod: ,,, | Performed by: NURSE PRACTITIONER

## 2021-12-22 PROCEDURE — 83036 HEMOGLOBIN GLYCOSYLATED A1C: CPT | Performed by: PSYCHIATRY & NEUROLOGY

## 2021-12-22 RX ORDER — RISPERIDONE 0.5 MG/1
1 TABLET ORAL 2 TIMES DAILY
Status: DISCONTINUED | OUTPATIENT
Start: 2021-12-22 | End: 2021-12-24

## 2021-12-22 RX ORDER — CLONAZEPAM 0.5 MG/1
0.5 TABLET ORAL 2 TIMES DAILY
Status: DISCONTINUED | OUTPATIENT
Start: 2021-12-22 | End: 2021-12-24

## 2021-12-22 RX ADMIN — OLANZAPINE 10 MG: 10 INJECTION, POWDER, LYOPHILIZED, FOR SOLUTION INTRAMUSCULAR at 02:12

## 2021-12-22 RX ADMIN — THERA TABS 1 TABLET: TAB at 08:12

## 2021-12-22 RX ADMIN — RISPERIDONE 1 MG: 0.5 TABLET ORAL at 11:12

## 2021-12-22 RX ADMIN — OLANZAPINE 10 MG: 10 TABLET, FILM COATED ORAL at 08:12

## 2021-12-22 RX ADMIN — RISPERIDONE 1 MG: 0.5 TABLET ORAL at 08:12

## 2021-12-22 RX ADMIN — ESCITALOPRAM OXALATE 5 MG: 5 TABLET, FILM COATED ORAL at 08:12

## 2021-12-22 RX ADMIN — FOLIC ACID 1 MG: 1 TABLET ORAL at 08:12

## 2021-12-22 RX ADMIN — CLONAZEPAM 0.5 MG: 0.5 TABLET ORAL at 08:12

## 2021-12-22 RX ADMIN — CLONAZEPAM 0.5 MG: 0.5 TABLET ORAL at 11:12

## 2021-12-22 RX ADMIN — ARIPIPRAZOLE 2 MG: 2 TABLET ORAL at 08:12

## 2021-12-22 RX ADMIN — HYDROXYZINE PAMOATE 50 MG: 50 CAPSULE ORAL at 07:12

## 2021-12-22 NOTE — NURSING
Pt sleeping at this time, slept 5.5 hrs with 1 awakening. NAD. Resp even and unlabored.Pathways clear,bed in low position. Q 15 min safety check ongoing.All precautions maintained. Sitter remains at bedside.

## 2021-12-22 NOTE — NURSING
Patient continues to be confused and paranoid. He does not commit to taking oral Zyprexa. Therefore, Zyprexa was given IM in left gluteal. MHT and security guards on standby. Patient did not resist injection.

## 2021-12-22 NOTE — PROGRESS NOTES
"PSYCHIATRY DAILY INPATIENT PROGRESS NOTE  SUBSEQUENT HOSPITAL VISIT    ENCOUNTER DATE: 12/22/2021  SITE: [facility]    DATE OF ADMISSION: 12/21/2021  9:50 AM  LENGTH OF STAY: 1 days      CHIEF COMPLAINT   Olvin Posey is a 21 y.o. male, seen during daily vasques rounds on the inpatient unit.  Olvin Posey presented with the chief complaint of depression/SI, "I was manipulating my family.. myself.. I'm glad they found out.""      The patient was seen and examined. The chart was reviewed.     Reviewed notes from Rns, CTRS, RD and LPC and labs from the last 24 hours.    The patient's case was discussed with the treatment team/care providers today including Rns, CTRS and LPC    Staff reports severe (requiring PRN medications for non redirectable, agitated behavior in order to prevent harm to self or others) behavioral or management issues.     The patient has been compliant with treatment.      Subjective 12/22/2021       Today the patient reports that he is scared. He remains depressed, anxious and psychotic. He discussed feeling that people are using [employer] to mess with him. He has been having AH; he has been very paranoid.    He attempted to kill himself on the unit yesterday evening, "I feel like a failure.. I couldn't even kill myself."      The patient denies any side effects to medications.          Psychiatric ROS (observed, reported, or endorsed/denied):  Depressed mood - Continuing  Interest/pleasure/anhedonia: Continuing  Guilt/hopelessness/worthlessness - Continuing  Changes in Sleep - Continuing  Changes in Appetite - Continuing  Changes in Concentration - Continuing  Changes in Energy - Continuing  PMA/R- denies  Suicidal- active/passive ideations - Continuing  Homicidal ideations: active/passive ideations - denies    Hallucinations - Continuing  Delusions - Continuing  Disorganized behavior - denies  Disorganized speech - denies  Negative symptoms - denies    Elevated mood - " denies  Decreased need for sleep - denies  Grandiosity - denies  Racing thoughts - denies  Impulsivity - denies  Irritability- denies  Increased energy - denies  Distractibility - denies  Increase in goal-directed activity or PMA- denies    Symptoms of TEENA - Continuing  Symptoms of Panic Disorder- denies  Symptoms of PTSD - denies        Overall progress: Patient is showing no improvement on the Unit to date        Psychotherapy:  · Target symptoms: depression, psychosis  · Why chosen therapy is appropriate versus another modality: relevant to diagnosis, patient responds to this modality, evidence based practice  · Outcome monitoring methods: self-report, observation  · Therapeutic intervention type: insight oriented psychotherapy, behavior modifying psychotherapy, supportive psychotherapy, interactive psychotherapy  · Topics discussed/themes: building skills sets for symptom management, symptom recognition  · The patient's response to the intervention is accepting. The patient's progress toward treatment goals is limited, poor.   · Duration of intervention: 16 minutes.        Medical ROS  General ROS: negative  Ophthalmic ROS: negative  ENT ROS: negative  Allergy and Immunology ROS: negative  Hematological and Lymphatic ROS: negative  Endocrine ROS: negative  Respiratory ROS: no cough, shortness of breath, or wheezing  Cardiovascular ROS: no chest pain or dyspnea on exertion  Gastrointestinal ROS: no abdominal pain, change in bowel habits, or black or bloody stools  Genito-Urinary ROS: no dysuria, trouble voiding, or hematuria  Musculoskeletal ROS: negative  Neurological ROS: no TIA or stroke symptoms  Dermatological ROS: negative    PAST MEDICAL HISTORY   No past medical history on file.        PSYCHOTROPIC MEDICATIONS   Scheduled Meds:   ARIPiprazole  2 mg Oral Daily    EScitalopram oxalate  5 mg Oral Daily    folic acid  1 mg Oral Daily    multivitamin  1 tablet Oral Daily     Continuous Infusions:  PRN  "Meds:.acetaminophen, aluminum-magnesium hydroxide-simethicone, docusate sodium, hydrOXYzine pamoate, loperamide, nicotine, OLANZapine **AND** OLANZapine        EXAMINATION    VITALS   Vitals:    12/21/21 1010 12/21/21 2000 12/22/21 0733   BP: 135/86 (!) 141/82 124/74   BP Location: Right arm Right arm Right arm   Patient Position: Sitting Sitting Lying   Pulse: 96 (!) 132 91   Resp: 18 17 18   Temp: 98.2 °F (36.8 °C) 97.8 °F (36.6 °C) 98 °F (36.7 °C)   TempSrc: Temporal Temporal Temporal   Weight: 86.1 kg (189 lb 14.8 oz)     Height: 6' 4" (1.93 m)         Body mass index is 23.12 kg/m².    CONSTITUTIONAL  General Appearance: unremarkable, age appropriate, normal weight, well nourished     MUSCULOSKELETAL  Muscle Strength and Tone:no dyskinesia, no dystonia, no tremor, no tic  Abnormal Involuntary Movements: No  Gait and Station: non-ataxic     PSYCHIATRIC   Level of Consciousness: awake and alert   Orientation: person, place, time and situation  Grooming: Hospital garb and Well groomed  Psychomotor Behavior: normal, cooperative, eye contact normal, no PMA/R  Speech: normal tone, normal rate, normal pitch, normal volume, spontaneous  Language: grossly intact, able to name, able to repeat  Mood: anxious and dysphoric, guarded/scared  Affect: Anxious, Consistent with mood and Constricted  Thought Process: linear and logical;guraded   Associations: intact, no juliette   Thought Content: DENIES homicidal ideation, suicidal ideation and delusions  Perceptions: hallucinations: auditory  Memory: Able to recall past events, Remote intact and Recent intact  Attention:Decreased  Fund of Knowledge: Aware of current events and Vocabulary appropriate   Estimate if Intelligence:  Above average based on work/education history, vocabulary and mental status exam  Insight: intact, has awareness of illness  Judgment: poor- + behavior issues     DIAGNOSTIC TESTING   Laboratory Results  No results found for this or any previous visit (from " the past 24 hour(s)).          MEDICAL DECISION MAKING      ASSESSMENT:   MDD, recurrent, severe with psychotic features  R/o Bipolar Depression  TEENA with panic attacks     Psychosocial stressors     Hyperglycemia  hypokalemia        PROBLEM LIST AND MANAGEMENT PLANS     Depression with psychosis: pt counseled  -start trial of lexapro 5 mg po q day  -start trial of abilify 2 mg po q day- switch to risperdal 1 mg po BID   -klonopin as below     Anxiety: pt counseled  -lexapro as above  -start adjunctive klonopin 0.5 mg po BID  -vistaril prn     Psychosocial stressors: pt counseled  -SW consulted and assisting with resources     Hyperglycemia: pt counseled  -check HgA1c-  Pending      Hypokalemia: pt counseled  -FM consulted        PRESCRIPTION DRUG MANAGEMENT  Compliance: yes  Side Effects: no  Regimen Adjustments: see above     Discussed diagnosis, risks and benefits of proposed treatment vs alternative treatments vs no treatment, potential side effects of these treatments and the inherent unpredictability of treatment. The patient expresses understanding of the above and displays the capacity to agree with this treatment given said understanding. Patient also agrees that, currently, the benefits outweigh the risks and would like to pursue/continue treatment at this time.        DIAGNOSTIC TESTING  Labs reviewed with patient; follow up pending labs     Disposition:  -Will attempt to obtain outside psychiatric records if available  -SW to assist with aftercare planning and collateral  -Once stable discharge home with outpatient follow up care and/or rehab  -Continue inpatient treatment under a PEC and/or CEC for danger to self/ danger to others/grave disability as evident by significant psychotic thought disorder, danger to self, gravely disabled and suicidal ideation      DISCHARGE PLANNING  Expected Disposition Plan: Home or Self Care      NEED FOR CONTINUED HOSPITALIZATION  Psychiatric illness continues to pose a  potential threat to life or bodily function, of self or others, thereby requiring the need for continued inpatient psychiatric hospitalization: Yes, due to: significant psychotic thought disorder, danger to self and suicidal ideation, as evidenced by:  Ongoing concerns with active SI with plan to OD/Hang Self., Ongoing concerns with SI., Ongoing concerns with command hallucinations to hit/harm others. and Ongoing concerns with perceptual aberrancy and paranoid persecutory delusions leading to potential harm of self or others.    Protective inpatient pyschiatric hospitalization required while a safe disposition plan is enacted: Yes    Patient stabilized and ready for discharge from inpatient psychiatric unit: No        STAFF:   Randy Matthews MD  Psychiatry

## 2021-12-22 NOTE — NURSING
"Patient woke up asking for medication for sleep, educated patient to Zyprexa he been administered a short time ago.pateint requested water, this nurse reminded patient that water was safe, patient states "the water will hurt me?" patient reassured water was ok, patient drank a few sips of water. Took his socks off and requested they be thrown out, and laid back down. Sitter remains at bedside.  "

## 2021-12-22 NOTE — NURSING
Patient's mother  Tameka Greenwood  [phone]  Called with concern to check on patient's status.  She did have the patient code given to her by the patient yesterday.

## 2021-12-22 NOTE — NURSING
"While in hallway, pt comes out of room with blood on neck and hands stating "I tried to hurt myself." Immediately directed pt to room to assess and clean neck. Multiple superficial marks noted to neck from a pencil that was loaned to pt to write thoughts/feelings for therapeutic relief. No active bleeding noted. NADN. Pt states that the voices told him to kill himself, so he took the pencil and tried to stab himself. Pencil removed from pt's room. Pt fearful, paranoid, and anxious. After cleaning pt's neck, pt instructed that medication will be given to aid with anxiety and paranoia relief. Pt agrees to POC. Pt then standing by nurses station. Upon staff member opening the door to enter nurses station, pt grabs door and runs into nurses station. Pt uncooperative, unable to verbally redirect. Pt yelling that he must stay in nurses station where he is safe because he "is not safe out there." Code white initiated. PETTY Holley, Emma, RN, and myself directed out of nurses station. Security arrived on unit. PRN Zyprexa IM administered. Pt to be placed on 1:1 visual precautions. Dr. Matthews notified.  "

## 2021-12-22 NOTE — PROGRESS NOTES
12/22/21 1045   [facility] Group Therapy   Group Name Therapeutic Recreation   Participation Level None   Patient did not attend group. Patient anxious, restless, observed pacing in room, states he was scared, nurse Andrew was told about the patient's behavior.

## 2021-12-22 NOTE — NURSING
Patient awake. Confused, not sure what the voices are saying, states that he doesn't know what to tell us about the voices. Doesn't recall when the voices started but that it has been happening for a while. Patient reassured that that the voices are not real, the only people in the room are this nurse the MHT(sitting) and the patient. Patient looks up at the ceiling repeatedly. Pt asked if it ok to drink his water, states it makes him thirstier. Patient is asked patient if he did drugs in the past, patient says he smoked cigarettes, and his roommate vaped mentioning hemp, he did not know what MOJO was when asked. Patient remained calm and cooperative, but confused looking. He was reassured that he is safe here, and we are here to help him, assured patient this has happened to other people, and that he can be helped. Sitter remains with patient.

## 2021-12-22 NOTE — PLAN OF CARE
Moderate relief obtained from PRN zyprexa. Pt lying in bed with RR E/U. NADN. Pt remains on visual 1:1 contact. Safety precautions remain in place.

## 2021-12-22 NOTE — PLAN OF CARE
Patient anxious and paranoid; unkempt.  Denies intentions to harm self and/or others at this time.  Reports auditory hallucinations.  Affect and emotion labile, anxious, and distrustful/suspicious .  Thoughts are disorganized and illogical.  Isolates in room; minimal interactions with select peers and staff.  Accepts medications--PRN Zyprexa (po) given at 8am; refuses meals and water d/t paranoia.  Unable to discuss plan of care at this time; will continue to monitor patient.    Maintain 1:1 visual contact s/t patient stabbed self in neck with pencil yesterday (12/21).

## 2021-12-22 NOTE — SUBJECTIVE & OBJECTIVE
No past medical history on file.    Past Surgical History:   Procedure Laterality Date    ADENOIDECTOMY      INGUINAL HERNIA REPAIR         Review of patient's allergies indicates:  No Known Allergies    No current facility-administered medications on file prior to encounter.     Current Outpatient Medications on File Prior to Encounter   Medication Sig    finasteride (PROPECIA) 1 mg tablet Take 1 tablet (1 mg total) by mouth once daily.    nystatin-triamcinolone (MYCOLOG II) cream Apply topically 2 (two) times daily. apply to rash to index finger     Family History     Problem Relation (Age of Onset)    Asthma Mother    Depression Father    Heart disease Maternal Grandmother, Maternal Grandfather    Hypertension Maternal Grandmother, Maternal Grandfather    Seizures Other    Stroke Maternal Grandfather    Thyroid disease Mother (40)        Tobacco Use    Smoking status: Former Smoker     Types: Vaping with nicotine    Smokeless tobacco: Never Used   Substance and Sexual Activity    Alcohol use: Yes     Comment: occasionally     Drug use: Yes     Types: Marijuana     Comment: occasionally     Sexual activity: Yes     Partners: Female     Birth control/protection: Condom     Review of Systems   Constitutional: Negative for chills and fever.   Respiratory: Negative for chest tightness and shortness of breath.    Cardiovascular: Negative for chest pain and palpitations.   Gastrointestinal: Negative for abdominal distention, abdominal pain, blood in stool and vomiting.   Genitourinary: Negative for dysuria, flank pain, hematuria and urgency.   Musculoskeletal: Negative for back pain and neck pain.   Skin: Negative for rash and wound.   Neurological: Negative for dizziness, weakness and numbness.   Hematological: Does not bruise/bleed easily.   Psychiatric/Behavioral: Positive for dysphoric mood and hallucinations. Negative for agitation, self-injury and suicidal ideas. The patient is nervous/anxious.       Objective:     Vital Signs (Most Recent):  Temp: 98 °F (36.7 °C) (12/22/21 0733)  Pulse: 91 (12/22/21 0733)  Resp: 18 (12/22/21 0733)  BP: 124/74 (12/22/21 0733) Vital Signs (24h Range):  Temp:  [97.8 °F (36.6 °C)-98 °F (36.7 °C)] 98 °F (36.7 °C)  Pulse:  [] 91  Resp:  [17-18] 18  BP: (124-141)/(74-82) 124/74     Weight: 86.1 kg (189 lb 14.8 oz)  Body mass index is 23.12 kg/m².    Physical Exam  Vitals and nursing note reviewed.   Constitutional:       Appearance: He is well-developed and well-nourished.   HENT:      Head: Normocephalic and atraumatic.   Neck:      Thyroid: No thyromegaly.   Cardiovascular:      Rate and Rhythm: Normal rate and regular rhythm.      Pulses: Intact distal pulses.      Heart sounds: Normal heart sounds. No murmur heard.      Pulmonary:      Effort: Pulmonary effort is normal. No respiratory distress.      Breath sounds: Normal breath sounds. No wheezing or rales.   Abdominal:      General: Bowel sounds are normal. There is no distension.      Palpations: Abdomen is soft.      Tenderness: There is no abdominal tenderness.   Musculoskeletal:         General: No deformity or edema. Normal range of motion.      Cervical back: Normal range of motion and neck supple.   Skin:     General: Skin is warm and dry.   Neurological:      Mental Status: He is alert and oriented to person, place, and time.      Comments: Neuro: Cranial nerves:  CN II Visual fields full to confrontation.   CN III, IV, VI Pupils are equal, round, and reactive to light.  CN III: no palsy  Nystagmus: none   Diplopia: none  Ophthalmoparesis: none  CN V Facial sensation intact.   CN VII Facial expression full, symmetric.   CN VIII normal.   CN IX normal.   CN X normal.   CN XI normal.   CN XII normal.     Psychiatric:         Mood and Affect: Mood and affect normal.         Behavior: Behavior normal.         Thought Content: Thought content normal.         Significant Labs:     U/A 1+ketones, otherwise negative      UDS  Results for orders placed or performed during the hospital encounter of 12/20/21   Drug screen panel, emergency   Result Value Ref Range    Benzodiazepines Negative Negative    Methadone metabolites Negative Negative    Cocaine (Metab.) Negative Negative    Opiate Scrn, Ur Negative Negative    Barbiturate Screen, Ur Negative Negative    Amphetamine Screen, Ur Negative Negative    THC Negative Negative    Phencyclidine Negative Negative    Creatinine, Urine 120.2 23.0 - 375.0 mg/dL    Toxicology Information SEE COMMENT      CBC  Results for orders placed or performed during the hospital encounter of 12/20/21   CBC auto differential   Result Value Ref Range    WBC 8.67 3.90 - 12.70 K/uL    RBC 5.61 4.60 - 6.20 M/uL    Hemoglobin 16.5 14.0 - 18.0 g/dL    Hematocrit 47.4 40.0 - 54.0 %    MCV 85 82 - 98 fL    MCH 29.4 27.0 - 31.0 pg    MCHC 34.8 32.0 - 36.0 g/dL    RDW 12.0 11.5 - 14.5 %    Platelets 224 150 - 450 K/uL    MPV 8.5 (L) 9.2 - 12.9 fL    Immature Granulocytes 0.5 0.0 - 0.5 %    Gran # (ANC) 6.8 1.8 - 7.7 K/uL    Immature Grans (Abs) 0.04 0.00 - 0.04 K/uL    Lymph # 1.2 1.0 - 4.8 K/uL    Mono # 0.6 0.3 - 1.0 K/uL    Eos # 0.0 0.0 - 0.5 K/uL    Baso # 0.02 0.00 - 0.20 K/uL    nRBC 0 0 /100 WBC    Gran % 78.5 (H) 38.0 - 73.0 %    Lymph % 14.1 (L) 18.0 - 48.0 %    Mono % 6.5 4.0 - 15.0 %    Eosinophil % 0.2 0.0 - 8.0 %    Basophil % 0.2 0.0 - 1.9 %    Differential Method Automated      CMP  Results for orders placed or performed during the hospital encounter of 12/20/21   Comprehensive metabolic panel   Result Value Ref Range    Sodium 143 136 - 145 mmol/L    Potassium 3.4 (L) 3.5 - 5.1 mmol/L    Chloride 107 95 - 110 mmol/L    CO2 25 23 - 29 mmol/L    Glucose 133 (H) 70 - 110 mg/dL    BUN 7 6 - 20 mg/dL    Creatinine 1.0 0.5 - 1.4 mg/dL    Calcium 9.3 8.7 - 10.5 mg/dL    Total Protein 7.2 6.0 - 8.4 g/dL    Albumin 4.8 3.5 - 5.2 g/dL    Total Bilirubin 1.7 (H) 0.1 - 1.0 mg/dL    Alkaline Phosphatase 79  55 - 135 U/L    AST 23 10 - 40 U/L    ALT 31 10 - 44 U/L    Anion Gap 11 8 - 16 mmol/L    eGFR if African American >60 >60 mL/min/1.73 m^2    eGFR if non African American >60 >60 mL/min/1.73 m^2     TSH  Results for orders placed or performed during the hospital encounter of 12/20/21   TSH   Result Value Ref Range    TSH 1.017 0.400 - 4.000 uIU/mL     ETOH  Results for orders placed or performed during the hospital encounter of 12/20/21   Ethanol   Result Value Ref Range    Alcohol, Serum <10 <10 mg/dL     Salicylate  Results for orders placed or performed during the hospital encounter of 12/20/21   Salicylate level   Result Value Ref Range    Salicylate Lvl <5.0 (L) 15.0 - 30.0 mg/dL     Acetaminophen  Results for orders placed or performed during the hospital encounter of 12/20/21   Acetaminophen level   Result Value Ref Range    Acetaminophen (Tylenol), Serum <3.0 (L) 10.0 - 20.0 ug/mL

## 2021-12-22 NOTE — PSYCH
PLPC unable to complete assessment today. PLPC was advised by Doctor and RN to allow pt to rest in bed at this time. Pt is slightly irritable and paranoid. Pt is on a 1:1 for observation for safety.

## 2021-12-22 NOTE — PLAN OF CARE
"  Problem: Adult Behavioral Health Plan of Care  Goal: Optimized Coping Skills in Response to Life Stressors  Outcome: Ongoing, Progressing  Intervention: Promote Effective Coping Strategies  Flowsheets (Taken 12/22/2021 3852)  Supportive Measures:   active listening utilized   verbalization of feelings encouraged   counseling provided   self-care encouraged     Group Note:     Behavior  Pt attended psychotherapy group. Patient verbally participated in group.  Pt affect flat with constricted mood.    Intervention   CBT -based group psychotherapy today focused on attempting to identify affirmations. The worksheet  daily affirmations are short phrases that affect the subconscious mind and mold feelings, behaviors, and attitudes. They help to reprogram existing behaviors and thoughts, leading one to greater success and positive action and positive thoughts. [facility] presented the Daily Affirmations Poem where patients focused on their strengths and positive coping skills and how to figure out specifically positive ways to express their feeling and emotions in their life.    Response      Pt stated " I am not alone there are people who understand me ".     Plan  Patient will be encouraged to continue to attend group psychotherapy.     "

## 2021-12-22 NOTE — CONSULTS
[facility] Medicine  Consult Note    Patient Name: Olvin Posey  MRN: [id number]  Admission Date: 12/21/2021  Hospital Length of Stay: 1 days  Attending Physician: Randy Matthews MD   Primary Care Provider: Sierra Verdin MD           Patient information was obtained from patient and ER records.     Inpatient consult to [facility] for History and Physical  Consult performed by: Justine Taylor NP  Consult ordered by: Randy Matthews MD        Subjective:     Principal Problem: <principal problem not specified>    Chief Complaint: No chief complaint on file.       HPI: 22yo male paitent admitted to [facility] for hallucinations/paranoid. Medcine was consulted for H/p. He is 1:1. Very paranoid. Follows commands but does not answer much.       No past medical history on file.    Past Surgical History:   Procedure Laterality Date    ADENOIDECTOMY      INGUINAL HERNIA REPAIR         Review of patient's allergies indicates:  No Known Allergies    No current facility-administered medications on file prior to encounter.     Current Outpatient Medications on File Prior to Encounter   Medication Sig    finasteride (PROPECIA) 1 mg tablet Take 1 tablet (1 mg total) by mouth once daily.    nystatin-triamcinolone (MYCOLOG II) cream Apply topically 2 (two) times daily. apply to rash to index finger     Family History     Problem Relation (Age of Onset)    Asthma Mother    Depression Father    Heart disease Maternal Grandmother, Maternal Grandfather    Hypertension Maternal Grandmother, Maternal Grandfather    Seizures Other    Stroke Maternal Grandfather    Thyroid disease Mother (40)        Tobacco Use    Smoking status: Former Smoker     Types: Vaping with nicotine    Smokeless tobacco: Never Used   Substance and Sexual Activity    Alcohol use: Yes     Comment: occasionally     Drug use: Yes     Types: Marijuana     Comment: occasionally     Sexual activity: Yes     Partners:  Female     Birth control/protection: Condom     Review of Systems   Constitutional: Negative for chills and fever.   Respiratory: Negative for chest tightness and shortness of breath.    Cardiovascular: Negative for chest pain and palpitations.   Gastrointestinal: Negative for abdominal distention, abdominal pain, blood in stool and vomiting.   Genitourinary: Negative for dysuria, flank pain, hematuria and urgency.   Musculoskeletal: Negative for back pain and neck pain.   Skin: Negative for rash and wound.   Neurological: Negative for dizziness, weakness and numbness.   Hematological: Does not bruise/bleed easily.   Psychiatric/Behavioral: Positive for dysphoric mood and hallucinations. Negative for agitation, self-injury and suicidal ideas. The patient is nervous/anxious.      Objective:     Vital Signs (Most Recent):  Temp: 98 °F (36.7 °C) (12/22/21 0733)  Pulse: 91 (12/22/21 0733)  Resp: 18 (12/22/21 0733)  BP: 124/74 (12/22/21 0733) Vital Signs (24h Range):  Temp:  [97.8 °F (36.6 °C)-98 °F (36.7 °C)] 98 °F (36.7 °C)  Pulse:  [] 91  Resp:  [17-18] 18  BP: (124-141)/(74-82) 124/74     Weight: 86.1 kg (189 lb 14.8 oz)  Body mass index is 23.12 kg/m².    Physical Exam  Vitals and nursing note reviewed.   Constitutional:       Appearance: He is well-developed and well-nourished.   HENT:      Head: Normocephalic and atraumatic.   Neck:      Thyroid: No thyromegaly.   Cardiovascular:      Rate and Rhythm: Normal rate and regular rhythm.      Pulses: Intact distal pulses.      Heart sounds: Normal heart sounds. No murmur heard.      Pulmonary:      Effort: Pulmonary effort is normal. No respiratory distress.      Breath sounds: Normal breath sounds. No wheezing or rales.   Abdominal:      General: Bowel sounds are normal. There is no distension.      Palpations: Abdomen is soft.      Tenderness: There is no abdominal tenderness.   Musculoskeletal:         General: No deformity or edema. Normal range of motion.       Cervical back: Normal range of motion and neck supple.   Skin:     General: Skin is warm and dry.   Neurological:      Mental Status: He is alert and oriented to person, place, and time.      Comments: Neuro: Cranial nerves:  CN II Visual fields full to confrontation.   CN III, IV, VI Pupils are equal, round, and reactive to light.  CN III: no palsy  Nystagmus: none   Diplopia: none  Ophthalmoparesis: none  CN V Facial sensation intact.   CN VII Facial expression full, symmetric.   CN VIII normal.   CN IX normal.   CN X normal.   CN XI normal.   CN XII normal.     Psychiatric:         Mood and Affect: Mood and affect normal.         Behavior: Behavior normal.         Thought Content: Thought content normal.         Significant Labs:     U/A 1+ketones, otherwise negative     UDS  Results for orders placed or performed during the hospital encounter of 12/20/21   Drug screen panel, emergency   Result Value Ref Range    Benzodiazepines Negative Negative    Methadone metabolites Negative Negative    Cocaine (Metab.) Negative Negative    Opiate Scrn, Ur Negative Negative    Barbiturate Screen, Ur Negative Negative    Amphetamine Screen, Ur Negative Negative    THC Negative Negative    Phencyclidine Negative Negative    Creatinine, Urine 120.2 23.0 - 375.0 mg/dL    Toxicology Information SEE COMMENT      CBC  Results for orders placed or performed during the hospital encounter of 12/20/21   CBC auto differential   Result Value Ref Range    WBC 8.67 3.90 - 12.70 K/uL    RBC 5.61 4.60 - 6.20 M/uL    Hemoglobin 16.5 14.0 - 18.0 g/dL    Hematocrit 47.4 40.0 - 54.0 %    MCV 85 82 - 98 fL    MCH 29.4 27.0 - 31.0 pg    MCHC 34.8 32.0 - 36.0 g/dL    RDW 12.0 11.5 - 14.5 %    Platelets 224 150 - 450 K/uL    MPV 8.5 (L) 9.2 - 12.9 fL    Immature Granulocytes 0.5 0.0 - 0.5 %    Gran # (ANC) 6.8 1.8 - 7.7 K/uL    Immature Grans (Abs) 0.04 0.00 - 0.04 K/uL    Lymph # 1.2 1.0 - 4.8 K/uL    Mono # 0.6 0.3 - 1.0 K/uL    Eos # 0.0 0.0 -  0.5 K/uL    Baso # 0.02 0.00 - 0.20 K/uL    nRBC 0 0 /100 WBC    Gran % 78.5 (H) 38.0 - 73.0 %    Lymph % 14.1 (L) 18.0 - 48.0 %    Mono % 6.5 4.0 - 15.0 %    Eosinophil % 0.2 0.0 - 8.0 %    Basophil % 0.2 0.0 - 1.9 %    Differential Method Automated      CMP  Results for orders placed or performed during the hospital encounter of 12/20/21   Comprehensive metabolic panel   Result Value Ref Range    Sodium 143 136 - 145 mmol/L    Potassium 3.4 (L) 3.5 - 5.1 mmol/L    Chloride 107 95 - 110 mmol/L    CO2 25 23 - 29 mmol/L    Glucose 133 (H) 70 - 110 mg/dL    BUN 7 6 - 20 mg/dL    Creatinine 1.0 0.5 - 1.4 mg/dL    Calcium 9.3 8.7 - 10.5 mg/dL    Total Protein 7.2 6.0 - 8.4 g/dL    Albumin 4.8 3.5 - 5.2 g/dL    Total Bilirubin 1.7 (H) 0.1 - 1.0 mg/dL    Alkaline Phosphatase 79 55 - 135 U/L    AST 23 10 - 40 U/L    ALT 31 10 - 44 U/L    Anion Gap 11 8 - 16 mmol/L    eGFR if African American >60 >60 mL/min/1.73 m^2    eGFR if non African American >60 >60 mL/min/1.73 m^2     TSH  Results for orders placed or performed during the hospital encounter of 12/20/21   TSH   Result Value Ref Range    TSH 1.017 0.400 - 4.000 uIU/mL     ETOH  Results for orders placed or performed during the hospital encounter of 12/20/21   Ethanol   Result Value Ref Range    Alcohol, Serum <10 <10 mg/dL     Salicylate  Results for orders placed or performed during the hospital encounter of 12/20/21   Salicylate level   Result Value Ref Range    Salicylate Lvl <5.0 (L) 15.0 - 30.0 mg/dL     Acetaminophen  Results for orders placed or performed during the hospital encounter of 12/20/21   Acetaminophen level   Result Value Ref Range    Acetaminophen (Tylenol), Serum <3.0 (L) 10.0 - 20.0 ug/mL           CT head The subcutaneous tissues are unremarkable.  The bony calvarium is intact.  There is a mucous retention cyst within the right maxillary sinus.  The remaining paranasal sinuses are unremarkable.     The craniocervical junction is intact.  The  midline structures are unremarkable.  There are no extra-axial fluid collections.  There is no evidence of intracranial hemorrhage.  The ventricles and sulci are within normal limits.  There is cisterns are unremarkable.  The gray-white differentiation is maintained.  There is no dense vessel sign.  There is no evidence of mass effect.  Assessment/Plan:     Paranoid  Further orders per psych        VTE Risk Mitigation (From admission, onward)    None              Thank you for your consult. I will sign off. Please contact us if you have any additional questions.    Justine Taylor NP  Department of Hospital Medicine   [facility]

## 2021-12-22 NOTE — HPI
22yo male paitent admitted to [facility] for hallucinations/paranoid. Medcine was consulted for H/p. He is 1:1. Very paranoid. Follows commands but does not answer much.

## 2021-12-23 PROCEDURE — 90833 PSYTX W PT W E/M 30 MIN: CPT | Mod: ,,, | Performed by: PSYCHIATRY & NEUROLOGY

## 2021-12-23 PROCEDURE — S0166 INJ OLANZAPINE 2.5MG: HCPCS | Performed by: PSYCHIATRY & NEUROLOGY

## 2021-12-23 PROCEDURE — 90833 PR PSYCHOTHERAPY W/PATIENT W/E&M, 30 MIN (ADD ON): ICD-10-PCS | Mod: ,,, | Performed by: PSYCHIATRY & NEUROLOGY

## 2021-12-23 PROCEDURE — 99233 SBSQ HOSP IP/OBS HIGH 50: CPT | Mod: ,,, | Performed by: PSYCHIATRY & NEUROLOGY

## 2021-12-23 PROCEDURE — 11400000 HC PSYCH PRIVATE ROOM

## 2021-12-23 PROCEDURE — 99233 PR SUBSEQUENT HOSPITAL CARE,LEVL III: ICD-10-PCS | Mod: ,,, | Performed by: PSYCHIATRY & NEUROLOGY

## 2021-12-23 PROCEDURE — 25000003 PHARM REV CODE 250: Performed by: PSYCHIATRY & NEUROLOGY

## 2021-12-23 RX ADMIN — THERA TABS 1 TABLET: TAB at 09:12

## 2021-12-23 RX ADMIN — CLONAZEPAM 0.5 MG: 0.5 TABLET ORAL at 08:12

## 2021-12-23 RX ADMIN — FOLIC ACID 1 MG: 1 TABLET ORAL at 09:12

## 2021-12-23 RX ADMIN — CLONAZEPAM 0.5 MG: 0.5 TABLET ORAL at 09:12

## 2021-12-23 RX ADMIN — OLANZAPINE 10 MG: 10 INJECTION, POWDER, LYOPHILIZED, FOR SOLUTION INTRAMUSCULAR at 08:12

## 2021-12-23 RX ADMIN — RISPERIDONE 1 MG: 0.5 TABLET ORAL at 08:12

## 2021-12-23 RX ADMIN — RISPERIDONE 1 MG: 0.5 TABLET ORAL at 09:12

## 2021-12-23 RX ADMIN — ESCITALOPRAM OXALATE 5 MG: 5 TABLET, FILM COATED ORAL at 09:12

## 2021-12-23 RX ADMIN — OLANZAPINE 10 MG: 10 TABLET, FILM COATED ORAL at 05:12

## 2021-12-23 NOTE — PROGRESS NOTES
12/23/21 1509   Assessment   Patient's Identification of the Problem CTRS was unable to complete the assessment with the patient due to psychosis. CTRS was advised by the RN to allow the patient to rest. Patient is slightly irritable, anxious and paranoid. Patient is on a 1:1 for safety precautions. Staff will attempt to meet with the patient at a later time.

## 2021-12-23 NOTE — PROGRESS NOTES
"PSYCHIATRY DAILY INPATIENT PROGRESS NOTE  SUBSEQUENT HOSPITAL VISIT    ENCOUNTER DATE: 12/23/2021  SITE: [facility]    DATE OF ADMISSION: 12/21/2021  9:50 AM  LENGTH OF STAY: 2 days      CHIEF COMPLAINT   Olvin Posey is a 21 y.o. male, seen during daily vasques rounds on the inpatient unit.  Olvin Posey presented with the chief complaint of depression/SI, "I was manipulating my family.. myself.. I'm glad they found out.""      The patient was seen and examined. The chart was reviewed.     Reviewed notes from Rns, CTRS, NP and LPC and labs from the last 24 hours.    The patient's case was discussed with the treatment team/care providers today including Rns, CTRS and LPC    Staff reports some behavioral or management issues.     The patient has been compliant with treatment.      Subjective 12/23/2021       Today the patient reports that he is scared but "feeling better." He remains severely depressed, anxious and psychotic. He discussed feeling that people are using telepathy to mess with him. He has been having AH; he has been very paranoid.    He attempted to kill himself on the unit 2 days ago and stated, "I feel like a failure.. I couldn't even kill myself."    He minimally participated with treatment team. He left early sating "I need to vomit." He went to his room, bu he did not vomit.       The patient denies any side effects to medications.          Psychiatric ROS (observed, reported, or endorsed/denied):  Depressed mood - Continuing  Interest/pleasure/anhedonia: Continuing  Guilt/hopelessness/worthlessness - Continuing  Changes in Sleep - Continuing  Changes in Appetite - Continuing  Changes in Concentration - Continuing  Changes in Energy - Continuing  PMA/R- denies  Suicidal- active/passive ideations - Continuing  Homicidal ideations: active/passive ideations - denies    Hallucinations - Continuing  Delusions - Continuing  Disorganized behavior - denies  Disorganized speech - " denies  Negative symptoms - denies    Elevated mood - denies  Decreased need for sleep - denies  Grandiosity - denies  Racing thoughts - denies  Impulsivity - denies  Irritability- denies  Increased energy - denies  Distractibility - denies  Increase in goal-directed activity or PMA- denies    Symptoms of TEENA - Continuing  Symptoms of Panic Disorder- denies  Symptoms of PTSD - denies        Overall progress: Patient is showing no improvement on the Unit to date        Psychotherapy:  · Target symptoms: depression, psychosis  · Why chosen therapy is appropriate versus another modality: relevant to diagnosis, patient responds to this modality, evidence based practice  · Outcome monitoring methods: self-report, observation  · Therapeutic intervention type: insight oriented psychotherapy, behavior modifying psychotherapy, supportive psychotherapy, interactive psychotherapy  · Topics discussed/themes: building skills sets for symptom management, symptom recognition  · The patient's response to the intervention is accepting. The patient's progress toward treatment goals is limited, poor.   · Duration of intervention: 16 minutes.        Medical ROS  General ROS: negative  Ophthalmic ROS: negative  ENT ROS: negative  Allergy and Immunology ROS: negative  Hematological and Lymphatic ROS: negative  Endocrine ROS: negative  Respiratory ROS: no cough, shortness of breath, or wheezing  Cardiovascular ROS: no chest pain or dyspnea on exertion  Gastrointestinal ROS: no abdominal pain, change in bowel habits, or black or bloody stools  Genito-Urinary ROS: no dysuria, trouble voiding, or hematuria  Musculoskeletal ROS: negative  Neurological ROS: no TIA or stroke symptoms  Dermatological ROS: negative    PAST MEDICAL HISTORY   No past medical history on file.        PSYCHOTROPIC MEDICATIONS   Scheduled Meds:   clonazePAM  0.5 mg Oral BID    EScitalopram oxalate  5 mg Oral Daily    folic acid  1 mg Oral Daily    multivitamin  1  "tablet Oral Daily    risperiDONE  1 mg Oral BID     Continuous Infusions:  PRN Meds:.acetaminophen, aluminum-magnesium hydroxide-simethicone, docusate sodium, hydrOXYzine pamoate, loperamide, nicotine, OLANZapine **AND** OLANZapine        EXAMINATION    VITALS   Vitals:    12/21/21 2000 12/22/21 0733 12/22/21 2000 12/23/21 0734   BP: (!) 141/82 124/74 125/69 137/76   BP Location: Right arm Right arm Right arm Right arm   Patient Position: Sitting Lying Sitting Lying   Pulse: (!) 132 91 108 (!) 113   Resp: 17 18 20 16   Temp: 97.8 °F (36.6 °C) 98 °F (36.7 °C) 98.1 °F (36.7 °C) 98 °F (36.7 °C)   TempSrc: Temporal Temporal Temporal Temporal   Weight:       Height:           Body mass index is 23.12 kg/m².    CONSTITUTIONAL  General Appearance: unremarkable, age appropriate, normal weight, well nourished     MUSCULOSKELETAL  Muscle Strength and Tone:no dyskinesia, no dystonia, no tremor, no tic  Abnormal Involuntary Movements: No  Gait and Station: non-ataxic     PSYCHIATRIC   Level of Consciousness: awake and alert   Orientation: person, place, time and situation  Grooming: Hospital garb and Well groomed  Psychomotor Behavior: normal, cooperative, eye contact normal, no PMA/R  Speech: normal tone, normal rate, normal pitch, normal volume, spontaneous  Language: grossly intact, able to name, able to repeat  Mood: anxious and dysphoric, guarded/scared "better"  Affect: Anxious, inConsistent with mood and Constricted  Thought Process: linear and logical;guraded   Associations: intact, no juliette   Thought Content: DENIES homicidal ideation, suicidal ideation and delusions  Perceptions: hallucinations: auditory  Memory: Able to recall past events, Remote intact and Recent intact  Attention:Decreased  Fund of Knowledge: Aware of current events and Vocabulary appropriate   Estimate if Intelligence:  Above average based on work/education history, vocabulary and mental status exam  Insight: intact, has awareness of " illness  Judgment: poor- + behavior issues     DIAGNOSTIC TESTING   Laboratory Results  No results found for this or any previous visit (from the past 24 hour(s)).          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      Psychosocial stressors     Hyperglycemia  Hypokalemia        PROBLEM LIST AND MANAGEMENT PLANS     Depression with psychosis: pt counseled  -start trial of lexapro 5 mg po q day- increase to 10 mg po q day  -start trial of abilify 2 mg po q day- switch to/conitnue risperdal 1 mg po BID- will conitue to change/optimze as indicated  -klonopin as below     Anxiety: pt counseled  -lexapro as above  -started/continue adjunctive klonopin 0.5 mg po BID  -vistaril prn    Insomnia: pt counseled  -vistaril prn     Psychosocial stressors: pt counseled  -SW consulted and assisting with resources     Hyperglycemia: pt counseled  -check HgA1c-  WNL     Hypokalemia: pt counseled  -FM consulted        PRESCRIPTION DRUG MANAGEMENT  Compliance: yes  Side Effects: no  Regimen Adjustments: see above     Discussed diagnosis, risks and benefits of proposed treatment vs alternative treatments vs no treatment, potential side effects of these treatments and the inherent unpredictability of treatment. The patient expresses understanding of the above and displays the capacity to agree with this treatment given said understanding. Patient also agrees that, currently, the benefits outweigh the risks and would like to pursue/continue treatment at this time.        DIAGNOSTIC TESTING  Labs reviewed with patient; follow up pending labs     Disposition:  -Will attempt to obtain outside psychiatric records if available  -SW to assist with aftercare planning and collateral  -Once stable discharge home with outpatient follow up care and/or rehab  -Continue inpatient treatment under a PEC and/or CEC for  danger to self/ danger to others/grave disability as evident by significant psychotic thought disorder, danger to self, gravely disabled and suicidal ideation      DISCHARGE PLANNING  Expected Disposition Plan: Home or Self Care      NEED FOR CONTINUED HOSPITALIZATION  Psychiatric illness continues to pose a potential threat to life or bodily function, of self or others, thereby requiring the need for continued inpatient psychiatric hospitalization: Yes, due to: significant psychotic thought disorder, danger to self and suicidal ideation, as evidenced by:  Ongoing concerns with active SI with plan to OD/Hang Self., Ongoing concerns with SI., Ongoing concerns with command hallucinations to hit/harm others. and Ongoing concerns with perceptual aberrancy and paranoid persecutory delusions leading to potential harm of self or others.    Protective inpatient pyschiatric hospitalization required while a safe disposition plan is enacted: Yes    Patient stabilized and ready for discharge from inpatient psychiatric unit: No        STAFF:   Randy Matthews MD  Psychiatry

## 2021-12-23 NOTE — PSYCH
PLPC discussed with pt on collateral consent. Pt refused to sign and on verbal consent for collateral consent. PLPC was unable to complete assessment due being advised by tech and RN to allow pt to rest in bed at this time.Tech and RN reports pt is slightly irritable at this time. Pt is 1:1 for safety at this time.

## 2021-12-23 NOTE — NURSING
Pt sleeping at this time, slept 6 hrs with 3 awakenings. NAD. Resp even and unlabored.Pathways clear,bed in low position. Q 15 min safety check ongoing.All precautions maintained. Sitter remains at bedside.

## 2021-12-23 NOTE — PLAN OF CARE
"Pt states that he feels "tired" today, spending time in activity room and room. Denies SI/HI at this time. +AH. +paranoia. +thought blocking and slow to respond. Reports sleep disturbances. PRN Visteril administered to aid with sleep. Medication complaint. Remains calm and cooperative. Remains on 1:1 visual contact. Safety precautions remain in place.  "

## 2021-12-23 NOTE — PLAN OF CARE
"Patient guarded; unkempt.  Continues to express intentions to harm self-request for soap "can I have some more soap so I could kill myself".   Reports auditory hallucinations.  Affect and emotion flat, anxious, and distrustful/suspicious.  Thoughts are illogical.  Isolates in room; minimal Interactions with select peers and staff.  Did NOT leave room for breakfast; patient needs convincing to accept po medications and water.    08:35--Patient attempting to induce vomiting for unknown reasons.  PRN Zyprexa IM given at 0845am for extreme anxiety and agitation and delusional thoughts; NOT redirectable.   Unable to educate patient at this time.    Maintain 1:1 visual contact.    11:25-Patient's mother brought a letter to the patient. She is expressing much concern and willingness to participate in patient's care. Staff will attempt to get consent signed from patient as soon as appropriate.    Patient resting with no acute distress.  "

## 2021-12-23 NOTE — PROGRESS NOTES
12/23/21 1040   [facility] Group Therapy   Group Name Therapeutic Recreation   Participation Level None   Patient is resting in bed with eyes closed and did not attend group. Patient was slow to respond, observed being anxious, suspicious, and appeared distracted. Staff will continue to monitor, encourage group participation and document progress made.

## 2021-12-23 NOTE — PROGRESS NOTES
12/23/21 1521   Assessment   Patient's Identification of the Problem Patient depressed, anxious, guarded, scared and paranoid. Per H&P patient admit is due to Depression/SI. Patient reports he is scared but feeling better and states that people are using telepathy to mess with him. UDS was negative. Patient is single, no children, has Bachelor Degree, unemployed, lives in an apartment with a friend. Chart reviewed and patient observed to complete the assessment.   Leisure Interest Exercise   Leisure Barriers Loss of Interest   Treatment Focus To Improve Reality Orientation;To Improve Mood;To Decrease Guardedness and Isolation;To Improve Leisure Awareness/Lifestyle/Interest;To Promote Successful and Safe Self Expression;To Improve Coping Skills;To Establish Trust and Build Trust     Treatment Recommendation:   1:1 Intervention (as needed)    Reality Orientation Skilled Activity  Cognitive Stimulation Skilled Activity  Self Expression Skilled Activity  Mild Exercises Skilled Activity  Coping Skilled Activity    Treatment Goal(s):  Long Term Goals Refer To Master Treatment Plan    Short Term Treatment Goal(s)  Patient Will:  Exhibit Improvement in Mood  Demonstrate Improvement in Thought Processes / Awareness  Demonstrate Constructive Expression of Feelings and Behavior  Identify at Least 2 Coping Skills or Leisure Skills to Reduce Depression and Hopelessness Upon Request from Therapist    Discharge Recommendations:  Encourage Patient to Actively Utilize Available Community Resources to Increase Leisure Involvement to Decrease Signs and Symptoms of Illness  Encourage Patient to Utilize Coping Skills on a Regular Basis to Reduce the Risk of Decompensating and Re-Hospitalizations  Follow Up with After Care Appointments  Continue with Current Leisure Activities

## 2021-12-23 NOTE — PLAN OF CARE
"  Problem: Adult Behavioral Health Plan of Care  Goal: Rounds/Family Conference  Outcome: Ongoing, Progressing  Flowsheets (Taken 12/23/2021 1116)  Participants:   psychiatrist   nursing   therapeutic recreation   other (LPC)    TREATMENT TEAM     Chief Complaint:  Pt remains severly depressed with blocking thoughts, anxious, and psychotic.   Pt states he is having auditory hallucinations.  Pt states he was manipulating family and himself.       Pt Goal(s):  Pt abruptly left session stating " I have to throw up". Nurse escorted pt to room, but pt did not throw up. Pt did not return was unable to complete treatment team.       Current Progress:   Pt attended treatment team dressed in blue paper scrubs.  Pt affect constricted with dysphoric mood  Pt states he feels kind of dry mouth,but it is resolving.  Pt is not really sure if the medications are helping me. I can't tell you if they are or not.  Pt states he is feeling scared, but better.     Program/groups:  To encourage pt to continue attendance to treatment team and to attend all groups.        Revisions to Plan:  Medications:  Lexapro 5 mg increase to 10 mg  Abilify 2 mg -switch to/continue Risperdal 1 mg and will continue to change/optimize as indicated   to/conitnue risperdal 1 mg po BID- will conitue to change/optimze as indicated.            "

## 2021-12-24 PROCEDURE — U0005 INFEC AGEN DETEC AMPLI PROBE: HCPCS | Performed by: PSYCHIATRY & NEUROLOGY

## 2021-12-24 PROCEDURE — 99233 SBSQ HOSP IP/OBS HIGH 50: CPT | Mod: ,,, | Performed by: PSYCHIATRY & NEUROLOGY

## 2021-12-24 PROCEDURE — 99233 PR SUBSEQUENT HOSPITAL CARE,LEVL III: ICD-10-PCS | Mod: ,,, | Performed by: PSYCHIATRY & NEUROLOGY

## 2021-12-24 PROCEDURE — 90833 PR PSYCHOTHERAPY W/PATIENT W/E&M, 30 MIN (ADD ON): ICD-10-PCS | Mod: ,,, | Performed by: PSYCHIATRY & NEUROLOGY

## 2021-12-24 PROCEDURE — 25000003 PHARM REV CODE 250: Performed by: PSYCHIATRY & NEUROLOGY

## 2021-12-24 PROCEDURE — 90833 PSYTX W PT W E/M 30 MIN: CPT | Mod: ,,, | Performed by: PSYCHIATRY & NEUROLOGY

## 2021-12-24 PROCEDURE — 11400000 HC PSYCH PRIVATE ROOM

## 2021-12-24 PROCEDURE — U0003 INFECTIOUS AGENT DETECTION BY NUCLEIC ACID (DNA OR RNA); SEVERE ACUTE RESPIRATORY SYNDROME CORONAVIRUS 2 (SARS-COV-2) (CORONAVIRUS DISEASE [COVID-19]), AMPLIFIED PROBE TECHNIQUE, MAKING USE OF HIGH THROUGHPUT TECHNOLOGIES AS DESCRIBED BY CMS-2020-01-R: HCPCS | Performed by: PSYCHIATRY & NEUROLOGY

## 2021-12-24 RX ORDER — ESCITALOPRAM OXALATE 10 MG/1
10 TABLET ORAL DAILY
Status: DISCONTINUED | OUTPATIENT
Start: 2021-12-25 | End: 2022-01-04

## 2021-12-24 RX ORDER — RISPERIDONE 2 MG/1
2 TABLET ORAL 2 TIMES DAILY
Status: DISCONTINUED | OUTPATIENT
Start: 2021-12-24 | End: 2022-01-04

## 2021-12-24 RX ORDER — CLONAZEPAM 1 MG/1
1 TABLET ORAL 2 TIMES DAILY
Status: DISCONTINUED | OUTPATIENT
Start: 2021-12-24 | End: 2021-12-27

## 2021-12-24 RX ADMIN — HYDROXYZINE PAMOATE 50 MG: 50 CAPSULE ORAL at 08:12

## 2021-12-24 RX ADMIN — CLONAZEPAM 0.5 MG: 0.5 TABLET ORAL at 09:12

## 2021-12-24 RX ADMIN — RISPERIDONE 2 MG: 2 TABLET ORAL at 08:12

## 2021-12-24 RX ADMIN — RISPERIDONE 1 MG: 0.5 TABLET ORAL at 09:12

## 2021-12-24 RX ADMIN — ESCITALOPRAM OXALATE 5 MG: 5 TABLET, FILM COATED ORAL at 09:12

## 2021-12-24 RX ADMIN — OLANZAPINE 10 MG: 10 TABLET, FILM COATED ORAL at 09:12

## 2021-12-24 RX ADMIN — THERA TABS 1 TABLET: TAB at 09:12

## 2021-12-24 RX ADMIN — CLONAZEPAM 1 MG: 1 TABLET ORAL at 08:12

## 2021-12-24 RX ADMIN — FOLIC ACID 1 MG: 1 TABLET ORAL at 09:12

## 2021-12-24 NOTE — PLAN OF CARE
Plan of care reviewed. Denies intent to harm others at this time. Remains on one-on-one observation to prevent self-harm.  Gait steady, no falls. Very limited interaction noted staff and peers. Patient continues to respond to internal stimuli. Promoted individualized safety plan, reality-based interactions, effective coping strategies, and impulse control. Will continue precautions and monitor for safety.

## 2021-12-24 NOTE — NURSING
Pt is sleeping at this time and has slept 8.5 hours intermittently.  NAD.  Resp even & unlabored.  Pathways clear and bed in low position.  Q 15 minute safety checks ongoing.  All precautions maintained

## 2021-12-24 NOTE — NURSING
Patient observed actively RIS, preoccupied, districted, anxious.  Zyprexa 10 mg PO @ 0850.  Taken without inertance  .Maintain 1:1 peer protocol.

## 2021-12-24 NOTE — PLAN OF CARE
Pt taking scheduled medications w/out difficulty;  Seizure /  Fall  prevention in place;  SVC  Monitored per policy. Denies SI / HI; No C/O pain  No sleep disturbances as reported by PM shift.     Upon morning assessment patient was   awake  anxious    quiet      talkative      accepting of care    Denies A/VH. No RIS observed. Endorses A/VH  explain: Admits to hearing voices          RIS noted   preoccupied, distracted, mumbling  Contracted for safety.   Hygiene: Refused to shower  Avoids social contact, No Interaction with peers noted.  (Mood/Behavior/Emotion): anxious  flat   hypoactive labile sad  withdrawn  cooperative anxious guarded labile depressed tense distrustful thought blocking & response lag  Ate meals/snack.     Did not attend/participate in groups.  Continue POC.    negative No oral lesions; no gross abnormalities

## 2021-12-24 NOTE — PROGRESS NOTES
"PSYCHIATRY DAILY INPATIENT PROGRESS NOTE  SUBSEQUENT HOSPITAL VISIT    ENCOUNTER DATE: 12/24/2021  SITE: [facility] [place]    DATE OF ADMISSION: 12/21/2021  9:50 AM  LENGTH OF STAY: 3 days      CHIEF COMPLAINT   Olvin Posey is a 21 y.o. male, seen during daily vasques rounds on the inpatient unit.  Olvin Posey presented with the chief complaint of depression/SI, "I was manipulating my family.. myself.. I'm glad they found out.""      The patient was seen and examined. The chart was reviewed.     Reviewed notes from Rns, CTRS and LPC and labs from the last 24 hours.    The patient's case was discussed with the treatment team/care providers today including Rns    Staff reports severe (requiring PRN medications for non redirectable, agitated behavior in order to prevent harm to self or others) behavioral or management issues.     The patient has been compliant with treatment.      Subjective 12/24/2021       Today the patient again reports that he is scared and confused but "feeling better." He remains severely depressed, anxious and psychotic. He discussed feeling that people are using [employer] to mess with him. He has been having AH; he remains very paranoid.    He attempted to kill himself on the unit 3 days ago and stated, "I feel like a failure.. I couldn't even kill myself."    He required several prns in the last 24 hours for fx/bx impairing psychotic symtoms      The patient denies any side effects to medications.          Psychiatric ROS (observed, reported, or endorsed/denied):  Depressed mood - Continuing  Interest/pleasure/anhedonia: Continuing  Guilt/hopelessness/worthlessness - Continuing  Changes in Sleep - Continuing  Changes in Appetite - Continuing  Changes in Concentration - Continuing  Changes in Energy - Continuing  PMA/R- denies  Suicidal- active/passive ideations - Continuing  Homicidal ideations: active/passive ideations - denies    Hallucinations - Continuing  Delusions - " Continuing  Disorganized behavior - denies  Disorganized speech - denies  Negative symptoms - denies    Elevated mood - denies  Decreased need for sleep - denies  Grandiosity - denies  Racing thoughts - denies  Impulsivity - denies  Irritability- denies  Increased energy - denies  Distractibility - denies  Increase in goal-directed activity or PMA- denies    Symptoms of TEENA - Continuing  Symptoms of Panic Disorder- denies  Symptoms of PTSD - denies        Overall progress: Patient is showing no improvement on the Unit to date        Psychotherapy:  · Target symptoms: depression, psychosis  · Why chosen therapy is appropriate versus another modality: relevant to diagnosis, patient responds to this modality, evidence based practice  · Outcome monitoring methods: self-report, observation  · Therapeutic intervention type: insight oriented psychotherapy, behavior modifying psychotherapy, supportive psychotherapy, interactive psychotherapy  · Topics discussed/themes: building skills sets for symptom management, symptom recognition  · The patient's response to the intervention is accepting. The patient's progress toward treatment goals is limited, poor.   · Duration of intervention: 16 minutes.        Medical ROS  General ROS: negative  Ophthalmic ROS: negative  ENT ROS: negative  Allergy and Immunology ROS: negative  Hematological and Lymphatic ROS: negative  Endocrine ROS: negative  Respiratory ROS: no cough, shortness of breath, or wheezing  Cardiovascular ROS: no chest pain or dyspnea on exertion  Gastrointestinal ROS: no abdominal pain, change in bowel habits, or black or bloody stools  Genito-Urinary ROS: no dysuria, trouble voiding, or hematuria  Musculoskeletal ROS: negative  Neurological ROS: no TIA or stroke symptoms  Dermatological ROS: negative    PAST MEDICAL HISTORY   No past medical history on file.        PSYCHOTROPIC MEDICATIONS   Scheduled Meds:   clonazePAM  0.5 mg Oral BID    EScitalopram oxalate  5  "mg Oral Daily    folic acid  1 mg Oral Daily    multivitamin  1 tablet Oral Daily    risperiDONE  1 mg Oral BID     Continuous Infusions:  PRN Meds:.acetaminophen, aluminum-magnesium hydroxide-simethicone, docusate sodium, hydrOXYzine pamoate, loperamide, nicotine, OLANZapine **AND** OLANZapine        EXAMINATION    VITALS   Vitals:    12/22/21 2000 12/23/21 0734 12/23/21 2000 12/24/21 0800   BP: 125/69 137/76 128/70 (!) 157/72   BP Location: Right arm Right arm Left arm Right arm   Patient Position: Sitting Lying Lying Sitting   Pulse: 108 (!) 113 98 (!) 114   Resp: 20 16 20 20   Temp: 98.1 °F (36.7 °C) 98 °F (36.7 °C) 97.9 °F (36.6 °C) 97.9 °F (36.6 °C)   TempSrc: Temporal Temporal Temporal Temporal   Weight:       Height:           Body mass index is 23.12 kg/m².    CONSTITUTIONAL  General Appearance: unremarkable, age appropriate, normal weight, well nourished     MUSCULOSKELETAL  Muscle Strength and Tone:no dyskinesia, no dystonia, no tremor, no tic  Abnormal Involuntary Movements: No  Gait and Station: non-ataxic     PSYCHIATRIC   Level of Consciousness: awake and alert   Orientation: person, place, time and situation  Grooming: Hospital garb and Well groomed  Psychomotor Behavior: normal, cooperative, eye contact normal, no PMA/R  Speech: normal tone, normal rate, normal pitch, normal volume, spontaneous  Language: grossly intact, able to name, able to repeat  Mood: anxious and dysphoric, guarded/scared "better"  Affect: Anxious, inConsistent with mood and Constricted  Thought Process: linear and logical;guraded   Associations: intact, no juliette   Thought Content: DENIES homicidal ideation, suicidal ideation and delusions  Perceptions: hallucinations: auditory  Memory: Able to recall past events, Remote intact and Recent intact  Attention:Decreased  Fund of Knowledge: Aware of current events and Vocabulary appropriate   Estimate if Intelligence:  Above average based on work/education history, vocabulary and " mental status exam  Insight: intact, has awareness of illness  Judgment: poor- + behavior issues     DIAGNOSTIC TESTING   Laboratory Results  No results found for this or any previous visit (from the past 24 hour(s)).          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      Psychosocial stressors     Hyperglycemia  Hypokalemia        PROBLEM LIST AND MANAGEMENT PLANS     Depression with psychosis: pt counseled  -start trial of lexapro 5 mg po q day- increase to 10 mg po q day starting today  -start trial of abilify 2 mg po q day- switch to risperdal 1 mg po BID- increase to 1/2today, then 2 mg po BID tomorrow; will conitue to change/optimze as indicated  -klonopin as below     Anxiety: pt counseled  -lexapro as above  -started adjunctive klonopin 0.5 mg po BID- increase to 1 mg po BID  -vistaril prn    Insomnia: pt counseled  -vistaril prn     Psychosocial stressors: pt counseled  -SW consulted and assisting with resources     Hyperglycemia: pt counseled  -check HgA1c-  WNL     Hypokalemia: pt counseled  -FM consulted        PRESCRIPTION DRUG MANAGEMENT  Compliance: yes  Side Effects: no  Regimen Adjustments: see above     Discussed diagnosis, risks and benefits of proposed treatment vs alternative treatments vs no treatment, potential side effects of these treatments and the inherent unpredictability of treatment. The patient expresses understanding of the above and displays the capacity to agree with this treatment given said understanding. Patient also agrees that, currently, the benefits outweigh the risks and would like to pursue/continue treatment at this time.        DIAGNOSTIC TESTING  Labs reviewed with patient; follow up pending labs     Disposition:  -Will attempt to obtain outside psychiatric records if available  -SW to assist with aftercare planning and  collateral  -Once stable discharge home with outpatient follow up care and/or rehab  -Continue inpatient treatment under a PEC and/or CEC for danger to self/ danger to others/grave disability as evident by significant psychotic thought disorder, danger to self, gravely disabled and suicidal ideation      DISCHARGE PLANNING  Expected Disposition Plan: Home or Self Care      NEED FOR CONTINUED HOSPITALIZATION  Psychiatric illness continues to pose a potential threat to life or bodily function, of self or others, thereby requiring the need for continued inpatient psychiatric hospitalization: Yes, due to: significant psychotic thought disorder, danger to self and suicidal ideation, as evidenced by:  Ongoing concerns with active SI with plan to OD/Hang Self., Ongoing concerns with SI., Ongoing concerns with command hallucinations to hit/harm others. and Ongoing concerns with perceptual aberrancy and paranoid persecutory delusions leading to potential harm of self or others.    Protective inpatient pyschiatric hospitalization required while a safe disposition plan is enacted: Yes    Patient stabilized and ready for discharge from inpatient psychiatric unit: No        STAFF:   Randy Matthews MD  Psychiatry

## 2021-12-24 NOTE — NURSING
Patient actively RIS, depressed, sad, paranoid & guarded.  Affect flat, restricted, & bizarre. Response lag & thought blocking present.  Presently denies SI / HI, verbally contracted for safety.  Admits to experiencing auditory hallucinations.  Maintain  1:1 per protocol.

## 2021-12-25 LAB
SARS-COV-2 RNA RESP QL NAA+PROBE: NOT DETECTED
SARS-COV-2- CYCLE NUMBER: NORMAL

## 2021-12-25 PROCEDURE — 25000003 PHARM REV CODE 250: Performed by: PSYCHIATRY & NEUROLOGY

## 2021-12-25 PROCEDURE — 99233 PR SUBSEQUENT HOSPITAL CARE,LEVL III: ICD-10-PCS | Mod: ,,, | Performed by: PSYCHIATRY & NEUROLOGY

## 2021-12-25 PROCEDURE — 90833 PR PSYCHOTHERAPY W/PATIENT W/E&M, 30 MIN (ADD ON): ICD-10-PCS | Mod: ,,, | Performed by: PSYCHIATRY & NEUROLOGY

## 2021-12-25 PROCEDURE — 99233 SBSQ HOSP IP/OBS HIGH 50: CPT | Mod: ,,, | Performed by: PSYCHIATRY & NEUROLOGY

## 2021-12-25 PROCEDURE — 11400000 HC PSYCH PRIVATE ROOM

## 2021-12-25 PROCEDURE — 90833 PSYTX W PT W E/M 30 MIN: CPT | Mod: ,,, | Performed by: PSYCHIATRY & NEUROLOGY

## 2021-12-25 RX ADMIN — DOCUSATE SODIUM 100 MG: 100 CAPSULE ORAL at 08:12

## 2021-12-25 RX ADMIN — THERA TABS 1 TABLET: TAB at 08:12

## 2021-12-25 RX ADMIN — FOLIC ACID 1 MG: 1 TABLET ORAL at 08:12

## 2021-12-25 RX ADMIN — RISPERIDONE 2 MG: 2 TABLET ORAL at 08:12

## 2021-12-25 RX ADMIN — ESCITALOPRAM OXALATE 10 MG: 10 TABLET ORAL at 08:12

## 2021-12-25 RX ADMIN — CLONAZEPAM 1 MG: 1 TABLET ORAL at 08:12

## 2021-12-25 RX ADMIN — ALUMINUM HYDROXIDE, MAGNESIUM HYDROXIDE, AND SIMETHICONE 30 ML: 200; 200; 20 SUSPENSION ORAL at 10:12

## 2021-12-25 RX ADMIN — HYDROXYZINE PAMOATE 50 MG: 50 CAPSULE ORAL at 08:12

## 2021-12-25 NOTE — PLAN OF CARE
"  Pt taking scheduled medications w/out difficulty;  Seizure /  Fall  prevention in place;  SVC Monitored per policy. Denies SI / HI; No C/O pain  No sleep disturbances as reported by PM shift.     Upon morning assessment patient was    awake   calm      quiet          accepting of care      Endorses Auditory Hallucinations  explain:   " voices are better "     RIS noted: Preoccupied, distracted  Contracted for safety.   Hygiene: Refused to shower  Avoids social contact, No Interaction with peers noted.  (Mood/Behavior/Emotion): blunted hypoactive  sad   withdrawn calm cooperative   guarded labile depressed   less disorganized, thought blocking  Ate meals/snack.     Did not attend/participate in groups.  Continue POC.   "

## 2021-12-25 NOTE — PLAN OF CARE
"Denies suicidal thoughts this shift.  Strict 1:1 maintained.  Voiced feeling anxious about going see his family when he did that to his neck.  "It had something to do with my family".  Guarded responses.  Said he feels a little better.  Flat affect.  Suspiciously accepted hs po meds.  Stressed compliance with meds upon discharge.  Positive encouragement given.  Denies psych hx.  Said he has a bachelor degree in philosophy and a minor in computer science.  Accepted prn hydroxyzine for anxiety.  Steady gait.    "

## 2021-12-25 NOTE — NURSING
Endores auditory hallucinations, denies visual hallucinations.   Depressed, sad, paranoid & guarded.  Affect  Blunted, appears unkempt & unshaven.. Response lag & thought blocking present, confused, less disorganized  Presently denies SI / HI, verbally contracted for safety.   .  Maintain  1:1 per protocol

## 2021-12-25 NOTE — PROGRESS NOTES
"PSYCHIATRY DAILY INPATIENT PROGRESS NOTE  SUBSEQUENT HOSPITAL VISIT    ENCOUNTER DATE: 12/25/2021  [facility] [place]    DATE OF ADMISSION: 12/21/2021  9:50 AM  LENGTH OF STAY: 4 days      CHIEF COMPLAINT   Olvin Posey is a 21 y.o. male, seen during daily vasques rounds on the inpatient unit.  Olvin Posey presented with the chief complaint of depression/SI, "I was manipulating my family.. myself.. I'm glad they found out.""      The patient was seen and examined. The chart was reviewed.     Reviewed notes from Rns and labs from the last 24 hours.    The patient's case was discussed with the treatment team/care providers today including Rns    Staff reports severe (requiring PRN medications for non redirectable, agitated behavior in order to prevent harm to self or others) behavioral or management issues; he remains on 1:1 for his safety.     The patient has been compliant with treatment.      Subjective 12/25/2021       Today the patient again reports that he is confused but "feeling better.. I think I better understand what was going on." He remains severely depressed, anxious and psychotic. He discussed feeling that people are using [employer] to mess with him. He has been having AH; he remains very paranoid.    He attempted to kill himself on the unit 4 days ago and stated, "I feel like a failure.. I couldn't even kill myself."    He reports severe feelings of guilt/worthlessness about being a "bad son" and taking advantage of his mother's generosity.     He required several prns in the last 24 hours for fx/bx impairing psychotic symtoms      The patient denies any side effects to medications.          Psychiatric ROS (observed, reported, or endorsed/denied):  Depressed mood - Continuing  Interest/pleasure/anhedonia: Continuing  Guilt/hopelessness/worthlessness - Continuing  Changes in Sleep - Continuing  Changes in Appetite - Continuing  Changes in Concentration - Continuing  Changes in Energy - " Continuing  PMA/R- denies  Suicidal- active/passive ideations - Continuing  Homicidal ideations: active/passive ideations - denies    Hallucinations - Continuing  Delusions - Continuing  Disorganized behavior - denies  Disorganized speech - denies  Negative symptoms - denies    Elevated mood - denies  Decreased need for sleep - denies  Grandiosity - denies  Racing thoughts - denies  Impulsivity - denies  Irritability- denies  Increased energy - denies  Distractibility - denies  Increase in goal-directed activity or PMA- denies    Symptoms of TEENA - Continuing  Symptoms of Panic Disorder- denies  Symptoms of PTSD - denies        Overall progress: Patient is showing no improvement on the Unit to date        Psychotherapy:  · Target symptoms: depression, psychosis  · Why chosen therapy is appropriate versus another modality: relevant to diagnosis, patient responds to this modality, evidence based practice  · Outcome monitoring methods: self-report, observation  · Therapeutic intervention type: insight oriented psychotherapy, behavior modifying psychotherapy, supportive psychotherapy, interactive psychotherapy  · Topics discussed/themes: building skills sets for symptom management, symptom recognition   · insight oriented techniques regarding guilt/worthlessness  · The patient's response to the intervention is accepting. The patient's progress toward treatment goals is limited, poor.   · Duration of intervention: 16 minutes.        Medical ROS  General ROS: negative  Ophthalmic ROS: negative  ENT ROS: negative  Allergy and Immunology ROS: negative  Hematological and Lymphatic ROS: negative  Endocrine ROS: negative  Respiratory ROS: no cough, shortness of breath, or wheezing  Cardiovascular ROS: no chest pain or dyspnea on exertion  Gastrointestinal ROS: no abdominal pain, change in bowel habits, or black or bloody stools  Genito-Urinary ROS: no dysuria, trouble voiding, or hematuria  Musculoskeletal ROS:  "negative  Neurological ROS: no TIA or stroke symptoms  Dermatological ROS: negative    PAST MEDICAL HISTORY   No past medical history on file.        PSYCHOTROPIC MEDICATIONS   Scheduled Meds:   clonazePAM  1 mg Oral BID    EScitalopram oxalate  10 mg Oral Daily    folic acid  1 mg Oral Daily    multivitamin  1 tablet Oral Daily    risperiDONE  2 mg Oral BID     Continuous Infusions:  PRN Meds:.acetaminophen, aluminum-magnesium hydroxide-simethicone, docusate sodium, hydrOXYzine pamoate, loperamide, nicotine, OLANZapine **AND** OLANZapine        EXAMINATION    VITALS   Vitals:    12/23/21 2000 12/24/21 0800 12/24/21 2000 12/25/21 0800   BP: 128/70 (!) 157/72 124/69 (!) 153/79   BP Location: Left arm Right arm Left arm Right arm   Patient Position: Lying Sitting Sitting Sitting   Pulse: 98 (!) 114 (!) 115 109   Resp: 20 20 18 18   Temp: 97.9 °F (36.6 °C) 97.9 °F (36.6 °C) 97.9 °F (36.6 °C) 97.9 °F (36.6 °C)   TempSrc: Temporal Temporal Temporal Temporal   Weight:       Height:           Body mass index is 23.12 kg/m².    CONSTITUTIONAL  General Appearance: unremarkable, age appropriate, normal weight, well nourished     MUSCULOSKELETAL  Muscle Strength and Tone:no dyskinesia, no dystonia, no tremor, no tic  Abnormal Involuntary Movements: No  Gait and Station: non-ataxic     PSYCHIATRIC   Level of Consciousness: awake and alert   Orientation: person, place, time and situation  Grooming: Hospital garb and Well groomed  Psychomotor Behavior: normal, cooperative, eye contact normal, no PMA/R  Speech: normal tone, normal rate, normal pitch, normal volume, spontaneous  Language: grossly intact, able to name, able to repeat  Mood: anxious and dysphoric, guarded/scared "better"  Affect: Anxious, in Consistent with mood and Constricted  Thought Process: linear and logical; guraded   Associations: intact, no juliette   Thought Content: DENIES homicidal ideation, suicidal ideation and delusions  Perceptions: hallucinations: " auditory  Memory: Able to recall past events, Remote intact and Recent intact  Attention:Decreased  Fund of Knowledge: Aware of current events and Vocabulary appropriate   Estimate if Intelligence:  Above average based on work/education history, vocabulary and mental status exam  Insight: intact, has awareness of illness  Judgment: poor- + behavior issues     DIAGNOSTIC TESTING   Laboratory Results  No results found for this or any previous visit (from the past 24 hour(s)).      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      Psychosocial stressors     Hyperglycemia  Hypokalemia        PROBLEM LIST AND MANAGEMENT PLANS     Depression with psychosis: pt counseled  -start trial of lexapro 5 mg po q day- increased to 10 mg po q day; will continue to optimize as indicated  -start trial of abilify 2 mg po q day- switch to risperdal 1 mg po BID- increase to 1/2today, then 2 mg po BID today; will conitue to change/optimze as indicated  -klonopin as below     Anxiety: pt counseled  -lexapro as above  -started adjunctive klonopin 0.5 mg po BID- increase to 1 mg po BID  -vistaril prn    Insomnia: pt counseled  -vistaril prn     Psychosocial stressors: pt counseled  -SW consulted and assisting with resources     Hyperglycemia: pt counseled  -check HgA1c-  WNL     Hypokalemia: pt counseled  -FM consulted        PRESCRIPTION DRUG MANAGEMENT  Compliance: yes  Side Effects: no  Regimen Adjustments: see above     Discussed diagnosis, risks and benefits of proposed treatment vs alternative treatments vs no treatment, potential side effects of these treatments and the inherent unpredictability of treatment. The patient expresses understanding of the above and displays the capacity to agree with this treatment given said understanding. Patient also agrees that, currently, the benefits outweigh the  risks and would like to pursue/continue treatment at this time.        DIAGNOSTIC TESTING  Labs reviewed with patient; follow up pending labs     Disposition:  -Will attempt to obtain outside psychiatric records if available  -SW to assist with aftercare planning and collateral  -Once stable discharge home with outpatient follow up care and/or rehab  -Continue inpatient treatment under a PEC and/or CEC for danger to self/ danger to others/grave disability as evident by significant psychotic thought disorder, danger to self, gravely disabled and suicidal ideation      DISCHARGE PLANNING  Expected Disposition Plan: Home or Self Care      NEED FOR CONTINUED HOSPITALIZATION  Psychiatric illness continues to pose a potential threat to life or bodily function, of self or others, thereby requiring the need for continued inpatient psychiatric hospitalization: Yes, due to: significant psychotic thought disorder, danger to self and suicidal ideation, as evidenced by:  Ongoing concerns with active SI with plan to OD/Hang Self., Ongoing concerns with SI., Ongoing concerns with command hallucinations to hit/harm others. and Ongoing concerns with perceptual aberrancy and paranoid persecutory delusions leading to potential harm of self or others.    Protective inpatient pyschiatric hospitalization required while a safe disposition plan is enacted: Yes    Patient stabilized and ready for discharge from inpatient psychiatric unit: No        STAFF:   Randy Matthews MD  Psychiatry

## 2021-12-25 NOTE — PLAN OF CARE
Rested quietly with closed eyes and even resp for 8 hours, as pt remains now.  Strict 1:1 maintained.

## 2021-12-26 PROCEDURE — 90833 PR PSYCHOTHERAPY W/PATIENT W/E&M, 30 MIN (ADD ON): ICD-10-PCS | Mod: ,,, | Performed by: PSYCHIATRY & NEUROLOGY

## 2021-12-26 PROCEDURE — 25000003 PHARM REV CODE 250: Performed by: PSYCHIATRY & NEUROLOGY

## 2021-12-26 PROCEDURE — 99232 SBSQ HOSP IP/OBS MODERATE 35: CPT | Mod: ,,, | Performed by: PSYCHIATRY & NEUROLOGY

## 2021-12-26 PROCEDURE — 99232 PR SUBSEQUENT HOSPITAL CARE,LEVL II: ICD-10-PCS | Mod: ,,, | Performed by: PSYCHIATRY & NEUROLOGY

## 2021-12-26 PROCEDURE — 90833 PSYTX W PT W E/M 30 MIN: CPT | Mod: ,,, | Performed by: PSYCHIATRY & NEUROLOGY

## 2021-12-26 PROCEDURE — 11400000 HC PSYCH PRIVATE ROOM

## 2021-12-26 RX ADMIN — THERA TABS 1 TABLET: TAB at 08:12

## 2021-12-26 RX ADMIN — CLONAZEPAM 1 MG: 1 TABLET ORAL at 08:12

## 2021-12-26 RX ADMIN — RISPERIDONE 2 MG: 2 TABLET ORAL at 08:12

## 2021-12-26 RX ADMIN — FOLIC ACID 1 MG: 1 TABLET ORAL at 08:12

## 2021-12-26 RX ADMIN — ESCITALOPRAM OXALATE 10 MG: 10 TABLET ORAL at 08:12

## 2021-12-26 RX ADMIN — HYDROXYZINE PAMOATE 50 MG: 50 CAPSULE ORAL at 08:12

## 2021-12-26 NOTE — NURSING
Endores auditory hallucinations, denies visual hallucinations.   Depressed, sad, & guarded. Affect  Blunted, unshaven. Thoughts linear,. response lag exhibited. Denies SI / HI, verbally contracted for safety.  Monitor q 15 minutes per protocol.
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
## 2021-12-26 NOTE — PROGRESS NOTES
"PSYCHIATRY DAILY INPATIENT PROGRESS NOTE  SUBSEQUENT HOSPITAL VISIT    ENCOUNTER DATE: 12/26/2021  [facility]    DATE OF ADMISSION: 12/21/2021  9:50 AM  LENGTH OF STAY: 5 days      CHIEF COMPLAINT   Olvin Posey is a 21 y.o. male, seen during daily vasques rounds on the inpatient unit.  Olvin Posey presented with the chief complaint of depression/SI, "I was manipulating my family.. myself.. I'm glad they found out.""    The patient was seen and examined. The chart was reviewed.     Reviewed notes from Rns and labs from the last 24 hours.    The patient's case was discussed with the treatment team/care providers today including Rns    Staff reports less behavioral or management issues.     The patient has been compliant with treatment.      Subjective 12/26/2021       Today the patient again reports that he "doing a little better." He remains severely but less depressed, anxious and psychotic.     He has been having less AH; he is less  paranoid.    He attempted to kill himself on the unit 5 days ago and stated, "I feel like a failure.. I couldn't even kill myself." He now reports regret over stabbing himself in the neck    He reports severe but less feelings of guilt/worthlessness about being a "bad son" and taking advantage of his mother's generosity.     The patient denies any side effects to medications.          Psychiatric ROS (observed, reported, or endorsed/denied):  Depressed mood - Continuing  Interest/pleasure/anhedonia: Continuing  Guilt/hopelessness/worthlessness - Continuing  Changes in Sleep - Continuing  Changes in Appetite - Continuing  Changes in Concentration - Continuing  Changes in Energy - Continuing  PMA/R- denies  Suicidal- active/passive ideations - Continuing  Homicidal ideations: active/passive ideations - denies    Hallucinations - Continuing  Delusions - Continuing  Disorganized behavior - denies  Disorganized speech - denies  Negative symptoms - denies    Elevated " mood - denies  Decreased need for sleep - denies  Grandiosity - denies  Racing thoughts - denies  Impulsivity - denies  Irritability- denies  Increased energy - denies  Distractibility - denies  Increase in goal-directed activity or PMA- denies    Symptoms of TEENA - Continuing  Symptoms of Panic Disorder- denies  Symptoms of PTSD - denies        Overall progress: Patient is showing mild improvement         Psychotherapy:  · Target symptoms: depression, psychosis  · Why chosen therapy is appropriate versus another modality: relevant to diagnosis, patient responds to this modality, evidence based practice  · Outcome monitoring methods: self-report, observation  · Therapeutic intervention type: insight oriented psychotherapy, behavior modifying psychotherapy, supportive psychotherapy, interactive psychotherapy  · Topics discussed/themes: building skills sets for symptom management, symptom recognition   · insight oriented techniques regarding guilt/worthlessness  · The patient's response to the intervention is accepting. The patient's progress toward treatment goals is fair.   · Duration of intervention: 16 minutes.        Medical ROS  General ROS: negative  Ophthalmic ROS: negative  ENT ROS: negative  Allergy and Immunology ROS: negative  Hematological and Lymphatic ROS: negative  Endocrine ROS: negative  Respiratory ROS: no cough, shortness of breath, or wheezing  Cardiovascular ROS: no chest pain or dyspnea on exertion  Gastrointestinal ROS: no abdominal pain, change in bowel habits, or black or bloody stools  Genito-Urinary ROS: no dysuria, trouble voiding, or hematuria  Musculoskeletal ROS: negative  Neurological ROS: no TIA or stroke symptoms  Dermatological ROS: negative    PAST MEDICAL HISTORY   No past medical history on file.        PSYCHOTROPIC MEDICATIONS   Scheduled Meds:   clonazePAM  1 mg Oral BID    EScitalopram oxalate  10 mg Oral Daily    folic acid  1 mg Oral Daily    multivitamin  1 tablet Oral  "Daily    risperiDONE  2 mg Oral BID     Continuous Infusions:  PRN Meds:.acetaminophen, aluminum-magnesium hydroxide-simethicone, docusate sodium, hydrOXYzine pamoate, loperamide, nicotine, OLANZapine **AND** OLANZapine        EXAMINATION    VITALS   Vitals:    12/25/21 0800 12/25/21 2000 12/26/21 0757 12/26/21 0800   BP: (!) 153/79 137/64  (!) 117/59   BP Location: Right arm Right arm  Right arm   Patient Position: Sitting Lying  Sitting   Pulse: 109 (!) 127  66   Resp: 18 19  18   Temp: 97.9 °F (36.6 °C) 98.1 °F (36.7 °C)  97.2 °F (36.2 °C)   TempSrc: Temporal Temporal  Temporal   Weight:   87.3 kg (192 lb 7.4 oz)    Height:           Body mass index is 23.43 kg/m².    CONSTITUTIONAL  General Appearance: unremarkable, age appropriate, normal weight, well nourished     MUSCULOSKELETAL  Muscle Strength and Tone:no dyskinesia, no dystonia, no tremor, no tic  Abnormal Involuntary Movements: No  Gait and Station: non-ataxic     PSYCHIATRIC   Level of Consciousness: awake and alert   Orientation: person, place, time and situation  Grooming: Hospital garb and Well groomed  Psychomotor Behavior: normal, cooperative, eye contact normal, no PMA/R  Speech: normal tone, normal rate, normal pitch, normal volume, spontaneous  Language: grossly intact, able to name, able to repeat  Mood: anxious and dysphoric, guarded/scared "better"  Affect: Anxious, in Consistent with mood and Constricted  Thought Process: linear and logical; less  guraded   Associations: intact, no juliette   Thought Content: DENIES homicidal ideation, suicidal ideation and delusions  Perceptions: hallucinations: auditory  Memory: Able to recall past events, Remote intact and Recent intact  Attention:Decreased  Fund of Knowledge: Aware of current events and Vocabulary appropriate   Estimate if Intelligence:  Above average based on work/education history, vocabulary and mental status exam  Insight: intact, has awareness of illness  Judgment: poor- + behavior " issues     DIAGNOSTIC TESTING   Laboratory Results  No results found for this or any previous visit (from the past 24 hour(s)).      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      Psychosocial stressors     Hyperglycemia  Hypokalemia        PROBLEM LIST AND MANAGEMENT PLANS     Depression with psychosis: pt counseled  -start trial of lexapro 5 mg po q day- increased to 10 mg po q day; will continue to optimize as indicated  -start trial of abilify 2 mg po q day- switch to risperdal 1 mg po BID- increase to 1/2; increase to/conitnue at 2 mg po BID today; will conitue to change/optimze as indicated  -klonopin, adjunctive, as below     Anxiety: pt counseled  -lexapro as above  -started adjunctive klonopin 0.5 mg po BID- increase to/continue 1 mg po BID  -vistaril prn    Insomnia: pt counseled  -vistaril prn  -klonopin as above     Psychosocial stressors: pt counseled  -SW consulted and assisting with resources     Hyperglycemia: pt counseled  -check HgA1c-  WNL     Hypokalemia: pt counseled  -FM consulted        PRESCRIPTION DRUG MANAGEMENT  Compliance: yes  Side Effects: no  Regimen Adjustments: see above     Discussed diagnosis, risks and benefits of proposed treatment vs alternative treatments vs no treatment, potential side effects of these treatments and the inherent unpredictability of treatment. The patient expresses understanding of the above and displays the capacity to agree with this treatment given said understanding. Patient also agrees that, currently, the benefits outweigh the risks and would like to pursue/continue treatment at this time.        DIAGNOSTIC TESTING  Labs reviewed with patient; follow up pending labs     Disposition:  -Will attempt to obtain outside psychiatric records if available  -SW to assist with aftercare planning and collateral  -Once stable  discharge home with outpatient follow up care and/or rehab  -Continue inpatient treatment under a PEC and/or CEC for danger to self/ danger to others/grave disability as evident by significant psychotic thought disorder, danger to self, gravely disabled and suicidal ideation      DISCHARGE PLANNING  Expected Disposition Plan: Home or Self Care      NEED FOR CONTINUED HOSPITALIZATION  Psychiatric illness continues to pose a potential threat to life or bodily function, of self or others, thereby requiring the need for continued inpatient psychiatric hospitalization: Yes, due to: significant psychotic thought disorder, danger to self and suicidal ideation, as evidenced by:  Ongoing concerns with active SI with plan to OD/Hang Self., Ongoing concerns with SI., Ongoing concerns with command hallucinations to hit/harm others. and Ongoing concerns with perceptual aberrancy and paranoid persecutory delusions leading to potential harm of self or others.    Protective inpatient pyschiatric hospitalization required while a safe disposition plan is enacted: Yes    Patient stabilized and ready for discharge from inpatient psychiatric unit: No        STAFF:   Randy Matthews MD  Psychiatry

## 2021-12-26 NOTE — PLAN OF CARE
Rested quietly with closed eyes and even resp for 5.5 hours, as pt remains now.  Modified VC maintained.

## 2021-12-26 NOTE — PLAN OF CARE
Dr Matthews updated on pt's condition.  Informed staff to decrease observation status to modified VC.

## 2021-12-26 NOTE — PLAN OF CARE
"   Group Note:     Behavior  PLPC met with pt individually. Pt was sitting very quietly watching T.V. with flat affect with dysphoric mood.     Intervention   CBT -based group psychotherapy today focused on attempting to identify Discharge planning. Discussed with patient plans for discharge, reasons for admit and changes that can be made for better coping skills     Response       Pt stated " My thoughts are getting better and I would have to talk to my mom to see if I can live with her or go back to my apartment".     Plan:  Patient will be encouraged to continue to attend group psychotherapy.              "

## 2021-12-26 NOTE — NURSING
Spoke with MD about patient's improved mood, denial of AH, contracted for safety, patient talking logically. Verbal from Dr Matthews to discontinue 1:1
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
## 2021-12-26 NOTE — PLAN OF CARE
Mood much improved.  Calm and cooperative.  Denies suicidal thoughts.  Verbally contracted for safety.  Denies paranoia/hallucinations.  Speech much improved.  Accepted meds.  Stressed compliance with meds upon discharge.    Steady gait.

## 2021-12-27 PROCEDURE — 25000003 PHARM REV CODE 250: Performed by: STUDENT IN AN ORGANIZED HEALTH CARE EDUCATION/TRAINING PROGRAM

## 2021-12-27 PROCEDURE — 99233 SBSQ HOSP IP/OBS HIGH 50: CPT | Mod: ,,, | Performed by: STUDENT IN AN ORGANIZED HEALTH CARE EDUCATION/TRAINING PROGRAM

## 2021-12-27 PROCEDURE — 99233 PR SUBSEQUENT HOSPITAL CARE,LEVL III: ICD-10-PCS | Mod: ,,, | Performed by: STUDENT IN AN ORGANIZED HEALTH CARE EDUCATION/TRAINING PROGRAM

## 2021-12-27 PROCEDURE — 11400000 HC PSYCH PRIVATE ROOM

## 2021-12-27 PROCEDURE — 25000003 PHARM REV CODE 250: Performed by: PSYCHIATRY & NEUROLOGY

## 2021-12-27 RX ORDER — CLONAZEPAM 0.5 MG/1
0.5 TABLET ORAL 2 TIMES DAILY
Status: DISCONTINUED | OUTPATIENT
Start: 2021-12-27 | End: 2021-12-28

## 2021-12-27 RX ADMIN — FOLIC ACID 1 MG: 1 TABLET ORAL at 08:12

## 2021-12-27 RX ADMIN — RISPERIDONE 2 MG: 2 TABLET ORAL at 08:12

## 2021-12-27 RX ADMIN — ESCITALOPRAM OXALATE 10 MG: 10 TABLET ORAL at 08:12

## 2021-12-27 RX ADMIN — CLONAZEPAM 0.5 MG: 0.5 TABLET ORAL at 08:12

## 2021-12-27 RX ADMIN — CLONAZEPAM 1 MG: 1 TABLET ORAL at 08:12

## 2021-12-27 RX ADMIN — THERA TABS 1 TABLET: TAB at 08:12

## 2021-12-27 NOTE — PLAN OF CARE
Patient calm and cooperative; quiet and withdrawn.  Denies intentions to harm self and/or others at this time. Denies auditory and/or visual hallucinations.  Affect and emotion blunted and sad.  Slow, delayed responses, speech, and movement; impoverished and thought blocking.  Interacts appropriately with peers and staff.  Accepts meals and medications.  Discussed medication and plan of care as well as healthy coping skills and techniques; patient needs continued education and reinforcement.

## 2021-12-27 NOTE — PROGRESS NOTES
"PSYCHIATRY DAILY INPATIENT PROGRESS NOTE  SUBSEQUENT HOSPITAL VISIT    ENCOUNTER DATE: 12/27/2021  SITE: [facility] [place]    DATE OF ADMISSION: 12/21/2021  9:50 AM  LENGTH OF STAY: 6 days      CHIEF COMPLAINT   Olvin Posey is a 21 y.o. male, seen during daily vasques rounds on the inpatient unit.  Olvin Posey presented with the chief complaint of depression/SI, "I was manipulating my family.. myself.. I'm glad they found out.""    The patient was seen and examined. The chart was reviewed.     Reviewed notes from Rns, MD and LPC and labs from the last 24 hours.    The patient's case was discussed with the treatment team/care providers today including Rns    Staff reports less behavioral or management issues.     The patient has been compliant with treatment.        Subjective 12/27/2021    Today patient reports "I thought that there wasn't any room for me in the whole."    He remains severely but less depressed, anxious and psychotic.     He has been having less AH; he is less  paranoid.    He attempted to kill himself on the hospital unit last week while admitted.    He reports severe but less feelings of guilt/worthlessness.    The patient denies any side effects to medications.        Psychiatric ROS (observed, reported, or endorsed/denied):  Depressed mood - fluctuating  Interest/pleasure/anhedonia: fluctuating  Guilt/hopelessness/worthlessness - Continuing  Changes in Sleep - Continuing  Changes in Appetite - fluctuating  Changes in Concentration - Continuing  Changes in Energy - Continuing  PMA/R- denies  Suicidal- active/passive ideations - fluctuating  Homicidal ideations: active/passive ideations - denies    Hallucinations - Continuing  Delusions - fluctuating  Disorganized behavior - denies  Disorganized speech - denies  Negative symptoms - denies    Elevated mood - denies  Decreased need for sleep - denies  Grandiosity - denies  Racing thoughts - denies  Impulsivity - denies  Irritability- " denies  Increased energy - denies  Distractibility - denies  Increase in goal-directed activity or PMA- denies    Symptoms of TEENA - less  Symptoms of Panic Disorder- denies  Symptoms of PTSD - denies        Overall progress: Patient is showing mild improvement         Medical ROS  General ROS: negative  Ophthalmic ROS: negative  ENT ROS: negative  Allergy and Immunology ROS: negative  Hematological and Lymphatic ROS: negative  Endocrine ROS: negative  Respiratory ROS: no cough, shortness of breath, or wheezing  Cardiovascular ROS: no chest pain or dyspnea on exertion  Gastrointestinal ROS: no abdominal pain, change in bowel habits, or black or bloody stools  Genito-Urinary ROS: no dysuria, trouble voiding, or hematuria  Musculoskeletal ROS: negative  Neurological ROS: no TIA or stroke symptoms  Dermatological ROS: negative          PAST MEDICAL HISTORY   No past medical history on file.        PSYCHOTROPIC MEDICATIONS   Scheduled Meds:   clonazePAM  1 mg Oral BID    EScitalopram oxalate  10 mg Oral Daily    folic acid  1 mg Oral Daily    multivitamin  1 tablet Oral Daily    risperiDONE  2 mg Oral BID     Continuous Infusions:  PRN Meds:.acetaminophen, aluminum-magnesium hydroxide-simethicone, docusate sodium, hydrOXYzine pamoate, loperamide, nicotine, OLANZapine **AND** OLANZapine      EXAMINATION    VITALS   Vitals:    12/26/21 0757 12/26/21 0800 12/26/21 2000 12/27/21 0726   BP:  (!) 117/59 120/76 (!) 121/56   BP Location:  Right arm Right arm Right arm   Patient Position:  Sitting Sitting Sitting   Pulse:  66 84 88   Resp:  18 18 20   Temp:  97.2 °F (36.2 °C) 97.5 °F (36.4 °C) 97 °F (36.1 °C)   TempSrc:  Temporal Temporal Temporal   Weight: 87.3 kg (192 lb 7.4 oz)      Height:           Body mass index is 23.43 kg/m².        CONSTITUTIONAL  General Appearance: unremarkable, age appropriate, normal weight, well nourished     MUSCULOSKELETAL  Muscle Strength and Tone: no dyskinesia, no dystonia, no tremor, no  "tic  Abnormal Involuntary Movements: No  Gait and Station: non-ataxic     PSYCHIATRIC   Level of Consciousness: awake and alert   Orientation: person, place, time and situation  Grooming: Hospital garb and Well groomed  Psychomotor Behavior: normal, cooperative, eye contact normal, no PMA/R  Speech: normal tone, normal rate, normal pitch, normal volume  Language: grossly intact, able to name, able to repeat  Mood: "okay"  Affect: Even  Thought Process: linear and logical; less  guraded   Associations: intact, no juliette   Thought Content: DENIES homicidal ideation, suicidal ideation and delusions  Perceptions: hallucinations: auditory  Memory: Able to recall past events, Remote intact and Recent intact  Attention: Decreased  Fund of Knowledge: Aware of current events and Vocabulary appropriate   Estimate if Intelligence:  Above average based on work/education history, vocabulary and mental status exam  Insight: intact, has awareness of illness  Judgment: improving- less behavior issues       DIAGNOSTIC TESTING   Laboratory Results  No results found for this or any previous visit (from the past 24 hour(s)).      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      Psychosocial stressors     Hyperglycemia  Hypokalemia        PROBLEM LIST AND MANAGEMENT PLANS     Depression with psychosis: pt counseled  -start trial of lexapro 5 mg po q day -increased to 10 mg po q day; will continue to optimize as indicated  -start trial of abilify 2 mg po q day- switch to risperdal 1 mg po BID- increase to 1/2; increased to/conitnue at 2 mg po BID today; will conitue to change/optimze as indicated  -klonopin, adjunctive, as below     Anxiety: pt counseled  -lexapro as above  -started adjunctive klonopin 0.5 mg po BID- increased to/continue 1 mg po BID  -decrease to 0.5 mg PO BID  -vistaril prn    Insomnia: pt " counseled  -vistaril prn  -klonopin as above     Psychosocial stressors: pt counseled  -SW consulted and assisting with resources     Hyperglycemia: pt counseled  -check HgA1c-  WNL     Hypokalemia: pt counseled  -FM consulted          PRESCRIPTION DRUG MANAGEMENT  Compliance: yes  Side Effects: no  Regimen Adjustments: see above     Discussed diagnosis, risks and benefits of proposed treatment vs alternative treatments vs no treatment, potential side effects of these treatments and the inherent unpredictability of treatment. The patient expresses understanding of the above and displays the capacity to agree with this treatment given said understanding. Patient also agrees that, currently, the benefits outweigh the risks and would like to pursue/continue treatment at this time.        DIAGNOSTIC TESTING  Labs reviewed with patient; follow up pending labs     Disposition:  -Will attempt to obtain outside psychiatric records if available  -SW to assist with aftercare planning and collateral  -Once stable discharge home with outpatient follow up care and/or rehab  -Continue inpatient treatment under a PEC and/or CEC for danger to self/ danger to others/grave disability as evident by significant psychotic thought disorder, danger to self, gravely disabled and suicidal ideation      DISCHARGE PLANNING  Expected Disposition Plan: Home or Self Care      NEED FOR CONTINUED HOSPITALIZATION  Psychiatric illness continues to pose a potential threat to life or bodily function, of self or others, thereby requiring the need for continued inpatient psychiatric hospitalization: Yes, due to: significant psychotic thought disorder, danger to self and suicidal ideation, as evidenced by:  Ongoing concerns with active SI with plan to OD/Hang Self., Ongoing concerns with SI., Ongoing concerns with command hallucinations to hit/harm others. and Ongoing concerns with perceptual aberrancy and paranoid persecutory delusions leading to  potential harm of self or others.    Protective inpatient pyschiatric hospitalization required while a safe disposition plan is enacted: Yes    Patient stabilized and ready for discharge from inpatient psychiatric unit: No        STAFF:   Omar Linton III, MD  Psychiatry

## 2021-12-27 NOTE — PLAN OF CARE
"Pt states that he feels "good" today" spending majority of time in room and activity room. Response lag noted, thought blocking noted. Pt otherwise interacting appropriately with staff/peers. Denies SI/HI at this time. Denies hallucinations. Medication complaint. PRN Visteril administered to aid with sleep. Appetite adequate. Remains calm and cooperative. NADN. Safety precautions remain in place.  "

## 2021-12-27 NOTE — NURSING
Pt sleeping at this time, slept 5.5 hrs with two awakenings. NADN. Resp even and unlabored. Pathways clear, bed in low position. Q15 min safety check ongoing. Safety precautions remain in place.

## 2021-12-27 NOTE — PROGRESS NOTES
"   12/27/21 1040   [facility] Group Therapy   Group Name Therapeutic Recreation   Specific Interventions Cognitive Stimulation Training   Participation Level Appropriate   Participation Quality Cooperative   Insight/Motivation Improved   Affect/Mood Display Appropriate   Cognition Alert;Oriented   Psychomotor WNL   Patient reports an "okay" mood, states when he came here he was hearing voices, denies hearing voices today. Patient states "the medicines, getting sleep and staying active each day" are helping, reports "my thought process is a little slow, it takes a little more concentration when I'm reading."   "

## 2021-12-27 NOTE — PLAN OF CARE
"  Problem: Adult Behavioral Health Plan of Care  Goal: Optimized Coping Skills in Response to Life Stressors  Outcome: Ongoing, Progressing  Intervention: Promote Effective Coping Strategies  Flowsheets (Taken 12/27/2021 1560)  Supportive Measures:   active listening utilized   counseling provided   positive reinforcement provided  Group Note      Behavior    Patient attended group psychotherapy today. Pt affect appropriate with euthymic mood.      Intervention      CBT-based group psychotherapy focused this afternoon on the Values Discussion Cards. Patients practiced how personal values are things that are most important to us. Values provide direction and give meaning to life. Pts explored and discussed their values of their lives.        Response     Patient stated " On an ideal day I would value my family and friends and the same on a typical day.".  .      Plan     Patient will be encouraged to continue to attend group psychotherapy.      "

## 2021-12-28 PROCEDURE — 99233 SBSQ HOSP IP/OBS HIGH 50: CPT | Mod: ,,, | Performed by: STUDENT IN AN ORGANIZED HEALTH CARE EDUCATION/TRAINING PROGRAM

## 2021-12-28 PROCEDURE — 90833 PSYTX W PT W E/M 30 MIN: CPT | Mod: ,,, | Performed by: STUDENT IN AN ORGANIZED HEALTH CARE EDUCATION/TRAINING PROGRAM

## 2021-12-28 PROCEDURE — 25000003 PHARM REV CODE 250: Performed by: PSYCHIATRY & NEUROLOGY

## 2021-12-28 PROCEDURE — 25000003 PHARM REV CODE 250: Performed by: STUDENT IN AN ORGANIZED HEALTH CARE EDUCATION/TRAINING PROGRAM

## 2021-12-28 PROCEDURE — 11400000 HC PSYCH PRIVATE ROOM

## 2021-12-28 PROCEDURE — 99233 PR SUBSEQUENT HOSPITAL CARE,LEVL III: ICD-10-PCS | Mod: ,,, | Performed by: STUDENT IN AN ORGANIZED HEALTH CARE EDUCATION/TRAINING PROGRAM

## 2021-12-28 PROCEDURE — 90833 PR PSYCHOTHERAPY W/PATIENT W/E&M, 30 MIN (ADD ON): ICD-10-PCS | Mod: ,,, | Performed by: STUDENT IN AN ORGANIZED HEALTH CARE EDUCATION/TRAINING PROGRAM

## 2021-12-28 RX ORDER — CLONAZEPAM 0.5 MG/1
0.5 TABLET ORAL NIGHTLY
Status: DISCONTINUED | OUTPATIENT
Start: 2021-12-28 | End: 2021-12-30

## 2021-12-28 RX ADMIN — ESCITALOPRAM OXALATE 10 MG: 10 TABLET ORAL at 08:12

## 2021-12-28 RX ADMIN — RISPERIDONE 2 MG: 2 TABLET ORAL at 08:12

## 2021-12-28 RX ADMIN — CLONAZEPAM 0.5 MG: 0.5 TABLET ORAL at 08:12

## 2021-12-28 RX ADMIN — FOLIC ACID 1 MG: 1 TABLET ORAL at 08:12

## 2021-12-28 RX ADMIN — THERA TABS 1 TABLET: TAB at 08:12

## 2021-12-28 NOTE — PLAN OF CARE
Patient quiet and withdrawn.  Denies intentions to harm self and/or others at this time. Denies auditory and/or visual hallucinations.  Affect and emotion congruent with situation.  Thoughts delayed/slow, linear and logical.  Isolates in room; minimal interacts appropriately with select peers and staff. Accepts meals and medications. Discussed medication and plan of care; patient voiced understanding, will continue to reinforce information.

## 2021-12-28 NOTE — PROGRESS NOTES
"   12/28/21 1421   [facility] Group Therapy   Group Name Other  (1:1)   Specific Interventions Other (see comments)  (healthy leisure activities)   Participation Level Appropriate;Sharing   Participation Quality Cooperative   Insight/Motivation Improved   Affect/Mood Display Appropriate   Cognition Alert   Psychomotor WNL   Patient presents calm reports "better" mood, states his concentration and thoughts are getting better each day, states yesterday he was drowsy, sleepy, today more alert. Patient reports he has been reading books and magazines his mom dropped off and asked for markers and paper to draw and write on.  "

## 2021-12-28 NOTE — PLAN OF CARE
Recommendation:   1. Continue regular diet as ordered   -Double portions ok with MD approval   2. RD to monitor    Goals: pt to consume at least 75% of meals by f/u  Nutrition Goal Status: goal met (goal continues)
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
## 2021-12-28 NOTE — PROGRESS NOTES
"[facility]  Adult Nutrition  Progress Note    SUMMARY       Recommendations    Recommendation:   1. Continue regular diet as ordered   -Double portions ok with MD approval   2. RD to monitor    Goals: pt to consume at least 75% of meals by f/u  Nutrition Goal Status: goal met (goal continues)  Communication of RD Recs:  (POC)    Assessment and Plan  Nutrition Problem:  Inadequate energy intake     Related to (etiology):   psychosis     Signs and Symptoms (as evidenced by):   Consuming 50% of meals   12/28: consuming 100% of meals     Interventions:  General Healthful Diet     Nutrition Diagnosis Status:   Improving       Reason for Assessment    Reason For Assessment: RD follow-up  Diagnosis: psychological disorder  Relevant Medical History: No past medical history on file.    General Information Comments: Pt is consuming 100% of meals now; Increase from last week.    Nutrition Discharge Planning: regular diet    Nutrition Risk Screen    Nutrition Risk Screen: no indicators present    Nutrition/Diet History    Spiritual, Cultural Beliefs, Amish Practices, Values that Affect Care: no  Food Allergies: NKFA  Factors Affecting Nutritional Intake: None identified at this time    Anthropometrics    Temp: 98.1 °F (36.7 °C)  Height: 6' 4" (193 cm)  Height (inches): 76 in  Weight Method: Standard Scale  Weight: 87.3 kg (192 lb 7.4 oz)  Weight (lb): 192.46 lb  Ideal Body Weight (IBW), Male: 202 lb  % Ideal Body Weight, Male (lb): 94.02 %  BMI (Calculated): 23.4  BMI Grade: 18.5-24.9 - normal       Lab/Procedures/Meds    Pertinent Labs Reviewed: reviewed  Pertinent Labs Comments: no new labs to review  Pertinent Medications Reviewed: reviewed  Pertinent Medications Comments: aripiprazole, folic acid, MVI    Estimated/Assessed Needs    Weight Used For Calorie Calculations: 86.1 kg (189 lb 13.1 oz)  Energy Calorie Requirements (kcal): 5027-1945  Energy Need Method: Kcal/kg (25-30)  Protein Requirements: " 69-86 g  Weight Used For Protein Calculations: 86.1 kg (189 lb 13.1 oz)     Estimated Fluid Requirement Method: RDA Method  RDA Method (mL): 2152       Nutrition Prescription Ordered    Current Diet Order: Regular diet  Nutrition Order Comments: double portions    Evaluation of Received Nutrient/Fluid Intake    Fluid Required: meeting needs  % Intake of Estimated Energy Needs: 75 - 100 %  % Meal Intake: 75 - 100 %    Nutrition Risk    Level of Risk/Frequency of Follow-up:  (1x/wk)     Monitor and Evaluation    Food and Nutrient Intake: food and beverage intake,energy intake  Anthropometric Measurements: weight,weight change,body mass index  Biochemical Data, Medical Tests and Procedures: electrolyte and renal panel,glucose/endocrine profile  Nutrition-Focused Physical Findings: overall appearance     Nutrition Follow-Up    RD Follow-up?: Yes

## 2021-12-28 NOTE — PLAN OF CARE
Plan of care reviewed. Denies intent to harm self or others at this time. Accepts snacks and medications. Gait steady, no falls. Limited interaction noted with staff and peers. Promoted individualized safety plan, reality-based interactions, effective coping strategies, and impulse control. Will continue precautions and monitor for safety.

## 2021-12-28 NOTE — PROGRESS NOTES
12/28/21 1040   [facility] Group Therapy   Group Name Therapeutic Recreation   Participation Level None   Patient did not attend group due to with the doctor and resting in assigned room. Staff will meet with the patient at a later time.

## 2021-12-28 NOTE — PLAN OF CARE
"  Problem: Adult Behavioral Health Plan of Care  Goal: Optimized Coping Skills in Response to Life Stressors  Outcome: Ongoing, Progressing  Intervention: Promote Effective Coping Strategies  Flowsheets (Taken 12/28/2021 1421)  Supportive Measures:   active listening utilized   positive reinforcement provided   counseling provided   self-reflection promoted   Group Note:     Behavior:  Patient attended group psychotherapy today.  Pt affect appropriate with euthymic mood.    Intervention   CBT -based group psychotherapy today focused on attempting to identify how not to quit in certain situations . PLPC presented Don't Quit Poem. Patients were encouraged to identify the feelings of not quitting and to discuss a time in their life when they experienced that particular feeling and how they can turn the negative feelings into positive ones.      Response:      Pt stated, " I can look at the positive of things and try my best to do what I think is right and do not quit on life."  Plan:  Patient will be encouraged to continue to attend group psychotherapy.   "

## 2021-12-29 LAB
ALBUMIN SERPL BCP-MCNC: 3.8 G/DL (ref 3.5–5.2)
ALP SERPL-CCNC: 65 U/L (ref 55–135)
ALT SERPL W/O P-5'-P-CCNC: 33 U/L (ref 10–44)
ANION GAP SERPL CALC-SCNC: 6 MMOL/L (ref 8–16)
AST SERPL-CCNC: 21 U/L (ref 10–40)
BILIRUB SERPL-MCNC: 0.7 MG/DL (ref 0.1–1)
BUN SERPL-MCNC: 14 MG/DL (ref 6–20)
CALCIUM SERPL-MCNC: 8.7 MG/DL (ref 8.7–10.5)
CHLORIDE SERPL-SCNC: 105 MMOL/L (ref 95–110)
CO2 SERPL-SCNC: 28 MMOL/L (ref 23–29)
CREAT SERPL-MCNC: 0.9 MG/DL (ref 0.5–1.4)
EST. GFR  (AFRICAN AMERICAN): >60 ML/MIN/1.73 M^2
EST. GFR  (NON AFRICAN AMERICAN): >60 ML/MIN/1.73 M^2
GLUCOSE SERPL-MCNC: 90 MG/DL (ref 70–110)
POTASSIUM SERPL-SCNC: 4.1 MMOL/L (ref 3.5–5.1)
PROT SERPL-MCNC: 5.7 G/DL (ref 6–8.4)
SODIUM SERPL-SCNC: 139 MMOL/L (ref 136–145)

## 2021-12-29 PROCEDURE — 11400000 HC PSYCH PRIVATE ROOM

## 2021-12-29 PROCEDURE — 36415 COLL VENOUS BLD VENIPUNCTURE: CPT | Performed by: STUDENT IN AN ORGANIZED HEALTH CARE EDUCATION/TRAINING PROGRAM

## 2021-12-29 PROCEDURE — 25000003 PHARM REV CODE 250: Performed by: STUDENT IN AN ORGANIZED HEALTH CARE EDUCATION/TRAINING PROGRAM

## 2021-12-29 PROCEDURE — 25000003 PHARM REV CODE 250: Performed by: PSYCHIATRY & NEUROLOGY

## 2021-12-29 PROCEDURE — 90833 PSYTX W PT W E/M 30 MIN: CPT | Mod: ,,, | Performed by: STUDENT IN AN ORGANIZED HEALTH CARE EDUCATION/TRAINING PROGRAM

## 2021-12-29 PROCEDURE — 90833 PR PSYCHOTHERAPY W/PATIENT W/E&M, 30 MIN (ADD ON): ICD-10-PCS | Mod: ,,, | Performed by: STUDENT IN AN ORGANIZED HEALTH CARE EDUCATION/TRAINING PROGRAM

## 2021-12-29 PROCEDURE — 99233 SBSQ HOSP IP/OBS HIGH 50: CPT | Mod: ,,, | Performed by: STUDENT IN AN ORGANIZED HEALTH CARE EDUCATION/TRAINING PROGRAM

## 2021-12-29 PROCEDURE — 99233 PR SUBSEQUENT HOSPITAL CARE,LEVL III: ICD-10-PCS | Mod: ,,, | Performed by: STUDENT IN AN ORGANIZED HEALTH CARE EDUCATION/TRAINING PROGRAM

## 2021-12-29 PROCEDURE — 80053 COMPREHEN METABOLIC PANEL: CPT | Performed by: STUDENT IN AN ORGANIZED HEALTH CARE EDUCATION/TRAINING PROGRAM

## 2021-12-29 RX ADMIN — THERA TABS 1 TABLET: TAB at 08:12

## 2021-12-29 RX ADMIN — ESCITALOPRAM OXALATE 10 MG: 10 TABLET ORAL at 08:12

## 2021-12-29 RX ADMIN — RISPERIDONE 2 MG: 2 TABLET ORAL at 08:12

## 2021-12-29 RX ADMIN — FOLIC ACID 1 MG: 1 TABLET ORAL at 08:12

## 2021-12-29 RX ADMIN — CLONAZEPAM 0.5 MG: 0.5 TABLET ORAL at 08:12
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
## 2021-12-29 NOTE — NURSING
Denies A/V  Hallucinations. Appears sad, & guarded. Affect congruent,,appears unshaven. Thoughts linear,. response lag exhibited. Denies SI / HI, verbally contracted for safety.  Monitor q 15 minutes per protocol.           V

## 2021-12-29 NOTE — PLAN OF CARE
Rested quietly for 8 hours with closed eyes and even resp, as pt remains now.  Modified VC maintained.  Pathways clear and bed in low position.

## 2021-12-29 NOTE — PLAN OF CARE
Speech and behavior appropriate  this shift.  Interacting appropriately with peers and staff.  Said he's feeling great.  Denies suicidal thoughts at this time.  Calm and cooperative.  Accepted meds.  Stressed compliance with meds upon discharge.  Steady gait.
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
## 2021-12-29 NOTE — PROGRESS NOTES
"   12/29/21 1100   [facility] Group Therapy   Group Name Therapeutic Recreation   Specific Interventions Cognitive Stimulation Training   Participation Level Appropriate;Attentive   Participation Quality Cooperative   Insight/Motivation Good   Affect/Mood Display Appropriate   Cognition Alert   Psychomotor WNL   Patient reports an "alright, little sleepy" mood, states "feel clear, able to concentrate better." Patient shared while on the unit reading, drawing, coloring and talking to others is helping him.  "

## 2021-12-30 PROCEDURE — 90833 PSYTX W PT W E/M 30 MIN: CPT | Mod: ,,, | Performed by: STUDENT IN AN ORGANIZED HEALTH CARE EDUCATION/TRAINING PROGRAM

## 2021-12-30 PROCEDURE — 99233 PR SUBSEQUENT HOSPITAL CARE,LEVL III: ICD-10-PCS | Mod: ,,, | Performed by: STUDENT IN AN ORGANIZED HEALTH CARE EDUCATION/TRAINING PROGRAM

## 2021-12-30 PROCEDURE — 90833 PR PSYCHOTHERAPY W/PATIENT W/E&M, 30 MIN (ADD ON): ICD-10-PCS | Mod: ,,, | Performed by: STUDENT IN AN ORGANIZED HEALTH CARE EDUCATION/TRAINING PROGRAM

## 2021-12-30 PROCEDURE — 25000003 PHARM REV CODE 250: Performed by: PSYCHIATRY & NEUROLOGY

## 2021-12-30 PROCEDURE — 99233 SBSQ HOSP IP/OBS HIGH 50: CPT | Mod: ,,, | Performed by: STUDENT IN AN ORGANIZED HEALTH CARE EDUCATION/TRAINING PROGRAM

## 2021-12-30 PROCEDURE — 25000003 PHARM REV CODE 250: Performed by: STUDENT IN AN ORGANIZED HEALTH CARE EDUCATION/TRAINING PROGRAM

## 2021-12-30 PROCEDURE — 11400000 HC PSYCH PRIVATE ROOM

## 2021-12-30 RX ORDER — CLONAZEPAM 0.25 MG/1
0.25 TABLET, ORALLY DISINTEGRATING ORAL NIGHTLY
Status: DISCONTINUED | OUTPATIENT
Start: 2021-12-30 | End: 2022-01-01

## 2021-12-30 RX ADMIN — RISPERIDONE 2 MG: 2 TABLET ORAL at 08:12

## 2021-12-30 RX ADMIN — FOLIC ACID 1 MG: 1 TABLET ORAL at 08:12

## 2021-12-30 RX ADMIN — THERA TABS 1 TABLET: TAB at 08:12

## 2021-12-30 RX ADMIN — HYDROXYZINE PAMOATE 50 MG: 50 CAPSULE ORAL at 08:12

## 2021-12-30 RX ADMIN — CLONAZEPAM 0.25 MG: 0.25 TABLET, ORALLY DISINTEGRATING ORAL at 08:12

## 2021-12-30 RX ADMIN — ESCITALOPRAM OXALATE 10 MG: 10 TABLET ORAL at 08:12

## 2021-12-30 NOTE — PLAN OF CARE
Pt making needs known, no distress noted,minimal interaction with others, accepting meds and snacks, denies suicidal ideations, no self harming behavior noted at this time.No falls noted, steady gait.
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
## 2021-12-30 NOTE — PROGRESS NOTES
"   12/30/21 1045   [facility] Group Therapy   Group Name Education   Specific Interventions Coping Skills Training   Participation Level Appropriate   Participation Quality Drowsy;Cooperative   Insight/Motivation Good   Affect/Mood Display Appropriate   Cognition Oriented   Psychomotor WNL   Patient presents sleepy, nodding throughout the activity, reports "cool and calm" mood, read items from self positive coping skills shirin, identified taking deep breaths, taking a break going for a walk, and journal your experience as positive coping skills he plan to use.  "

## 2021-12-30 NOTE — NURSING
Pt sleeping at this time, slept 8 hrs with 2 awakenings. NAD. Resp even and unlabored.Pathways clear,bed in low position. Q 15 min safety check ongoing.All precautions maintained.

## 2021-12-30 NOTE — NURSING
Denies A/V  Hallucinations. Appears sad, & guarded. Affect congruent,,appears unshaven, well kempt.. Thoughts linear,. response lag exhibited. Denies SI / HI, verbally contracted for safety.  Monitor q 15 minutes per protocol.              v

## 2021-12-30 NOTE — PLAN OF CARE
"  Problem: Adult Behavioral Health Plan of Care  Goal: Optimized Coping Skills in Response to Life Stressors  Outcome: Ongoing, Progressing  Intervention: Promote Effective Coping Strategies  Flowsheets (Taken 12/30/2021 5408)  Supportive Measures:   active listening utilized   counseling provided   guided imagery facilitated   self-care encouraged   self-reflection promoted   verbalization of feelings encouraged   positive reinforcement provided   Group Note:     Behavior  Pt attended psychotherapy group. Patient verbally participated in group. Pt affect appropriate with  euthymic mood.    Intervention   CBT -based group psychotherapy today focused on attempting to define Anxiety. The video was to allow patients to define what is anxiety. The video helped to reprogram existing behaviors and thoughts, leading one to greater success and positive action and positive thoughts. Patients focused on their strengths and how to figure out specifically positive coping skills to express their feeling and emotions in their life.    Response      Pt stated " I like to think of good positive memories and that makes me happy".     Plan  Patient will be encouraged to continue to attend group psychotherapy.   "

## 2021-12-31 PROCEDURE — 99233 SBSQ HOSP IP/OBS HIGH 50: CPT | Mod: ,,, | Performed by: STUDENT IN AN ORGANIZED HEALTH CARE EDUCATION/TRAINING PROGRAM

## 2021-12-31 PROCEDURE — 25000003 PHARM REV CODE 250: Performed by: PSYCHIATRY & NEUROLOGY

## 2021-12-31 PROCEDURE — 90833 PSYTX W PT W E/M 30 MIN: CPT | Mod: ,,, | Performed by: STUDENT IN AN ORGANIZED HEALTH CARE EDUCATION/TRAINING PROGRAM

## 2021-12-31 PROCEDURE — 90833 PR PSYCHOTHERAPY W/PATIENT W/E&M, 30 MIN (ADD ON): ICD-10-PCS | Mod: ,,, | Performed by: STUDENT IN AN ORGANIZED HEALTH CARE EDUCATION/TRAINING PROGRAM

## 2021-12-31 PROCEDURE — 99233 PR SUBSEQUENT HOSPITAL CARE,LEVL III: ICD-10-PCS | Mod: ,,, | Performed by: STUDENT IN AN ORGANIZED HEALTH CARE EDUCATION/TRAINING PROGRAM

## 2021-12-31 PROCEDURE — 25000003 PHARM REV CODE 250: Performed by: STUDENT IN AN ORGANIZED HEALTH CARE EDUCATION/TRAINING PROGRAM

## 2021-12-31 PROCEDURE — 11400000 HC PSYCH PRIVATE ROOM

## 2021-12-31 RX ORDER — DOCUSATE SODIUM 100 MG/1
100 CAPSULE, LIQUID FILLED ORAL DAILY
Status: DISCONTINUED | OUTPATIENT
Start: 2021-12-31 | End: 2022-01-02

## 2021-12-31 RX ORDER — TRAZODONE HYDROCHLORIDE 100 MG/1
100 TABLET ORAL NIGHTLY
Status: DISCONTINUED | OUTPATIENT
Start: 2021-12-31 | End: 2022-01-01

## 2021-12-31 RX ORDER — POLYETHYLENE GLYCOL 3350 17 G/17G
17 POWDER, FOR SOLUTION ORAL DAILY
Status: DISCONTINUED | OUTPATIENT
Start: 2021-12-31 | End: 2022-01-01

## 2021-12-31 RX ADMIN — RISPERIDONE 2 MG: 2 TABLET ORAL at 08:12

## 2021-12-31 RX ADMIN — THERA TABS 1 TABLET: TAB at 08:12

## 2021-12-31 RX ADMIN — TRAZODONE HYDROCHLORIDE 100 MG: 100 TABLET ORAL at 08:12

## 2021-12-31 RX ADMIN — DOCUSATE SODIUM 100 MG: 100 CAPSULE ORAL at 12:12

## 2021-12-31 RX ADMIN — POLYETHYLENE GLYCOL 3350 17 G: 17 POWDER, FOR SOLUTION ORAL at 12:12

## 2021-12-31 RX ADMIN — ESCITALOPRAM OXALATE 10 MG: 10 TABLET ORAL at 08:12

## 2021-12-31 RX ADMIN — FOLIC ACID 1 MG: 1 TABLET ORAL at 08:12

## 2021-12-31 RX ADMIN — CLONAZEPAM 0.25 MG: 0.25 TABLET, ORALLY DISINTEGRATING ORAL at 08:12

## 2021-12-31 RX ADMIN — HYDROXYZINE PAMOATE 50 MG: 50 CAPSULE ORAL at 08:12

## 2021-12-31 NOTE — PLAN OF CARE
Patient calm and cooperative.  Denies intentions to harm self and/or others at this time. Denies auditory and/or visual hallucinations.  Affect and emotion congruent with situation. Thoughts are linear and logical.  Interacts appropriately with peers and staff. Accepts meals and medications.  Discussed medication and plan of care; patient voices understanding-responds and interacts appropriately to situations.

## 2021-12-31 NOTE — NURSING
Pt sleeping at this time, slept 5 hrs with 2 awakenings. NAD. Resp even and unlabored.Pathways clear,bed in low position. Q 15 min safety check ongoing.All precautions maintained.

## 2021-12-31 NOTE — PLAN OF CARE
"Pt states that he feels "pretty good" today, spending majority of time in room reading. Interacting appropriately with staff members. Denies SI/HI at this time. Denies hallucinations. Medication compliant. Appetite adequate. PRN Visteril administered to aid with sleep. Remains calm and cooperative. NADN. Safety precautions remain in place.  "
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
## 2021-12-31 NOTE — PLAN OF CARE
"  Problem: Adult Behavioral Health Plan of Care  Goal: Optimized Coping Skills in Response to Life Stressors  Intervention: Promote Effective Coping Strategies  Flowsheets (Taken 12/31/2021 5017)  Supportive Measures:   active listening utilized   guided imagery facilitated   self-reflection promoted   counseling provided   Group Note      Behavior    PLPC met with pt indivdually. Pt affect appropriate with euthymic mood.      Intervention      CBT-based group psychotherapy focused this afternoon on The [employer] Poem. Patients practiced how personal values are things that are most important to us. Values provide direction and give meaning to life. Pts explored and discussed their values of their lives.        Response     Patient stated I can use this and read it daily because it states how one should feel".  .      Plan     Patient will be encouraged to continue to attend group psychotherapy .    "

## 2022-01-01 PROCEDURE — 11400000 HC PSYCH PRIVATE ROOM

## 2022-01-01 PROCEDURE — 25000003 PHARM REV CODE 250: Performed by: STUDENT IN AN ORGANIZED HEALTH CARE EDUCATION/TRAINING PROGRAM

## 2022-01-01 PROCEDURE — 90833 PSYTX W PT W E/M 30 MIN: CPT | Mod: ,,, | Performed by: STUDENT IN AN ORGANIZED HEALTH CARE EDUCATION/TRAINING PROGRAM

## 2022-01-01 PROCEDURE — 99233 SBSQ HOSP IP/OBS HIGH 50: CPT | Mod: ,,, | Performed by: STUDENT IN AN ORGANIZED HEALTH CARE EDUCATION/TRAINING PROGRAM

## 2022-01-01 PROCEDURE — 90833 PR PSYCHOTHERAPY W/PATIENT W/E&M, 30 MIN (ADD ON): ICD-10-PCS | Mod: ,,, | Performed by: STUDENT IN AN ORGANIZED HEALTH CARE EDUCATION/TRAINING PROGRAM

## 2022-01-01 PROCEDURE — 99233 PR SUBSEQUENT HOSPITAL CARE,LEVL III: ICD-10-PCS | Mod: ,,, | Performed by: STUDENT IN AN ORGANIZED HEALTH CARE EDUCATION/TRAINING PROGRAM

## 2022-01-01 PROCEDURE — 25000003 PHARM REV CODE 250: Performed by: PSYCHIATRY & NEUROLOGY

## 2022-01-01 RX ORDER — TRAZODONE HYDROCHLORIDE 50 MG/1
50 TABLET ORAL NIGHTLY
Status: DISCONTINUED | OUTPATIENT
Start: 2022-01-01 | End: 2022-01-03

## 2022-01-01 RX ADMIN — TRAZODONE HYDROCHLORIDE 50 MG: 50 TABLET ORAL at 08:01

## 2022-01-01 RX ADMIN — THERA TABS 1 TABLET: TAB at 08:01

## 2022-01-01 RX ADMIN — ESCITALOPRAM OXALATE 10 MG: 10 TABLET ORAL at 08:01

## 2022-01-01 RX ADMIN — POLYETHYLENE GLYCOL 3350 17 G: 17 POWDER, FOR SOLUTION ORAL at 08:01

## 2022-01-01 RX ADMIN — FOLIC ACID 1 MG: 1 TABLET ORAL at 08:01

## 2022-01-01 RX ADMIN — HYDROXYZINE PAMOATE 50 MG: 50 CAPSULE ORAL at 09:01

## 2022-01-01 RX ADMIN — RISPERIDONE 2 MG: 2 TABLET ORAL at 08:01

## 2022-01-01 RX ADMIN — DOCUSATE SODIUM 100 MG: 100 CAPSULE ORAL at 08:01

## 2022-01-01 NOTE — PLAN OF CARE
"Patient states that she feels "good," spending majority of time in room reading. Interacts appropriately with staff/peers. Denies SI/HI at this time. Denies hallucinations. Medication compliant. PRN Visteril administered to aid with sleep. Appetite adequate. Remains calm and cooperative. NADN. Safety precautions remain in place.  "

## 2022-01-01 NOTE — PLAN OF CARE
Sitting on bed, awake. Slept 7.5 hours thus far with 3 interruptions. Safety precautions maintained. Rounds done every 15 minutes. Bed is fixed in low position and room is uncluttered and pathways are clear.
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
## 2022-01-01 NOTE — PLAN OF CARE
Patient cooperative, pleasant, and quiet.  Denies intentions to harm self and/or others at this time. Denies auditory and/or visual hallucinations.  Affect and emotion congruent with situation.  Thoughts are linear and logical; limited insight into disease process and necessary follow ups.   Interacts appropriately with select peers and staff; often observed isolating in room reading. Accepts meals and medications. Discussed medication and plan of care; patient needs continued reinforcement on necessary follow up and medication compliance.  Patient plans to discharge home with family.

## 2022-01-02 PROCEDURE — 99233 PR SUBSEQUENT HOSPITAL CARE,LEVL III: ICD-10-PCS | Mod: ,,, | Performed by: STUDENT IN AN ORGANIZED HEALTH CARE EDUCATION/TRAINING PROGRAM

## 2022-01-02 PROCEDURE — 90833 PSYTX W PT W E/M 30 MIN: CPT | Mod: ,,, | Performed by: STUDENT IN AN ORGANIZED HEALTH CARE EDUCATION/TRAINING PROGRAM

## 2022-01-02 PROCEDURE — 25000003 PHARM REV CODE 250: Performed by: PSYCHIATRY & NEUROLOGY

## 2022-01-02 PROCEDURE — 25000003 PHARM REV CODE 250: Performed by: STUDENT IN AN ORGANIZED HEALTH CARE EDUCATION/TRAINING PROGRAM

## 2022-01-02 PROCEDURE — 11400000 HC PSYCH PRIVATE ROOM

## 2022-01-02 PROCEDURE — 99233 SBSQ HOSP IP/OBS HIGH 50: CPT | Mod: ,,, | Performed by: STUDENT IN AN ORGANIZED HEALTH CARE EDUCATION/TRAINING PROGRAM

## 2022-01-02 PROCEDURE — 90833 PR PSYCHOTHERAPY W/PATIENT W/E&M, 30 MIN (ADD ON): ICD-10-PCS | Mod: ,,, | Performed by: STUDENT IN AN ORGANIZED HEALTH CARE EDUCATION/TRAINING PROGRAM

## 2022-01-02 RX ORDER — DOCUSATE SODIUM 100 MG/1
100 CAPSULE, LIQUID FILLED ORAL 2 TIMES DAILY
Status: DISCONTINUED | OUTPATIENT
Start: 2022-01-02 | End: 2022-01-09 | Stop reason: HOSPADM

## 2022-01-02 RX ORDER — ADHESIVE BANDAGE
30 BANDAGE TOPICAL DAILY PRN
Status: DISCONTINUED | OUTPATIENT
Start: 2022-01-02 | End: 2022-01-09 | Stop reason: HOSPADM

## 2022-01-02 RX ADMIN — DOCUSATE SODIUM 100 MG: 100 CAPSULE ORAL at 08:01

## 2022-01-02 RX ADMIN — HYDROXYZINE PAMOATE 50 MG: 50 CAPSULE ORAL at 08:01

## 2022-01-02 RX ADMIN — THERA TABS 1 TABLET: TAB at 08:01

## 2022-01-02 RX ADMIN — TRAZODONE HYDROCHLORIDE 50 MG: 50 TABLET ORAL at 08:01

## 2022-01-02 RX ADMIN — ALUMINUM HYDROXIDE, MAGNESIUM HYDROXIDE, AND SIMETHICONE 30 ML: 200; 200; 20 SUSPENSION ORAL at 09:01

## 2022-01-02 RX ADMIN — MAGNESIUM HYDROXIDE 2400 MG: 400 SUSPENSION ORAL at 01:01

## 2022-01-02 RX ADMIN — RISPERIDONE 2 MG: 2 TABLET ORAL at 08:01

## 2022-01-02 RX ADMIN — FOLIC ACID 1 MG: 1 TABLET ORAL at 08:01

## 2022-01-02 RX ADMIN — ESCITALOPRAM OXALATE 10 MG: 10 TABLET ORAL at 08:01

## 2022-01-02 NOTE — PLAN OF CARE
Plan of care reviewed. Denies intent to harm self or others at this time. Accepts snacks and medications. Gait steady, no falls. Interacting with staff and peers. Promoted individualized safety plan, reality-based interactions, effective coping strategies, and impulse control. Will continue precautions and monitor for safety.

## 2022-01-02 NOTE — PLAN OF CARE
Lying quietly in bed, eyes closed, respirations even, unlabored. Apparently asleep. Slept 7 hours thus far with one interruption. Safety precautions maintained. Rounds done every 15 minutes. Bed is fixed in low position and room is uncluttered and pathways are clear.

## 2022-01-02 NOTE — PLAN OF CARE
Patient quiet and withdrawn yet, pleasant.  Denies intentions to harm self and/or others at this time. Denies auditory and/or visual hallucinations.  Affect and emotion congruent with situation.  Thoughts are linear and logical.  Isolates in room; interacts appropriately with peers and staff. Accepts meals and medications.  13:30-c/o constipation prn MOM given    Discussed medication and plan of care relating to discharge. Patient has family support system; parents have delivered him letters of support daily as well as magazines and newspapers.  Patient plans to move back to [place] closer to family and follow up with [facility] in [place]. Patient expresses more open-mindedness with the possibility of life-time medication management.

## 2022-01-02 NOTE — PROGRESS NOTES
"PSYCHIATRY DAILY INPATIENT PROGRESS NOTE  SUBSEQUENT HOSPITAL VISIT      ENCOUNTER DATE: 1/2/2022  [facility]      DATE OF ADMISSION: 12/21/2021  9:50 AM  LENGTH OF STAY: 12 days        CHIEF COMPLAINT   Olvin Posey is a 21 y.o. male, seen during daily vasques rounds on the inpatient unit.  Olvin Posey presented with the chief complaint of depression/SI, "I was manipulating my family.. myself.. I'm glad they found out.""    The patient was seen and examined. The chart was reviewed.     Reviewed notes from Rns and labs from the last 24 hours.    The patient's case was discussed with the treatment team/care providers today including Rns    Staff reports less behavioral or management issues.     The patient has been compliant with treatment.        Subjective 01/02/2022    Today patient reports "I feel good."    Patient agreeable to abstain from any future cannabis usage.    Recommended IOP after discharge, patient considering.    He remains severely but less depressed, anxious and psychotic.     He has been having less AH; he is less paranoid.    He attempted to kill himself on the hospital unit last week while admitted, stabbed his neck above vasculature multiple times with a pencil while experiencing psychotic depression.    The patient denies any side effects to medications.        Psychiatric ROS (observed, reported, or endorsed/denied):  Depressed mood - decreasing slowly  Interest/pleasure/anhedonia: decreasing slowly  Guilt/hopelessness/worthlessness - less  Changes in Sleep - less  Changes in Appetite - less  Changes in Concentration - decreasing slowly  Changes in Energy - less  PMA/R- denies  Suicidal- active/passive ideations - less  Homicidal ideations: active/passive ideations - denies    Hallucinations - less  Delusions - less  Disorganized behavior - denies  Disorganized speech - denies  Negative symptoms - denies    Elevated mood - denies  Decreased need for sleep - " denies  Grandiosity - denies  Racing thoughts - denies  Impulsivity - denies  Irritability- denies  Increased energy - denies  Distractibility - denies  Increase in goal-directed activity or PMA- denies    Symptoms of TEENA - less  Symptoms of Panic Disorder- denies  Symptoms of PTSD - denies        Psychotherapy:  · Target symptoms: mood disorder, psychosis  · Why chosen therapy is appropriate versus another modality: relevant to diagnosis  · Outcome monitoring methods: self-report  · Therapeutic intervention type: supportive psychotherapy  · Topics discussed/themes: substance abuse  · The patient's response to the intervention is accepting. The patient's progress toward treatment goals is good.   · Duration of intervention: 16 minutes.        Overall progress: Patient is showing moderate improvement            Medical ROS  General ROS: negative  Ophthalmic ROS: negative  ENT ROS: negative  Allergy and Immunology ROS: negative  Hematological and Lymphatic ROS: negative  Endocrine ROS: negative  Respiratory ROS: no cough, shortness of breath, or wheezing  Cardiovascular ROS: no chest pain or dyspnea on exertion  Gastrointestinal ROS: no abdominal pain, change in bowel habits, or black or bloody stools  Genito-Urinary ROS: no dysuria, trouble voiding, or hematuria  Musculoskeletal ROS: negative  Neurological ROS: no TIA or stroke symptoms  Dermatological ROS: negative        PAST MEDICAL HISTORY   No past medical history on file.        PSYCHOTROPIC MEDICATIONS   Scheduled Meds:   docusate sodium  100 mg Oral Daily    EScitalopram oxalate  10 mg Oral Daily    folic acid  1 mg Oral Daily    multivitamin  1 tablet Oral Daily    risperiDONE  2 mg Oral BID    traZODone  50 mg Oral QHS     Continuous Infusions:  PRN Meds:.acetaminophen, aluminum-magnesium hydroxide-simethicone, hydrOXYzine pamoate, loperamide, nicotine, OLANZapine **AND** OLANZapine      EXAMINATION    VITALS   Vitals:    01/01/22 0734 01/01/22 2000  "01/02/22 0745 01/02/22 0800   BP: 115/61 128/63 (!) 115/56    BP Location: Left arm Left arm Right arm    Patient Position: Sitting Sitting Sitting    Pulse: 88 83 85    Resp: 18 18 20    Temp: 97.6 °F (36.4 °C) 97.8 °F (36.6 °C) 97 °F (36.1 °C)    TempSrc: Temporal Temporal Temporal    Weight:    90.8 kg (200 lb 1.1 oz)   Height:           Body mass index is 24.35 kg/m².        CONSTITUTIONAL  General Appearance: unremarkable, age appropriate, normal weight, well nourished     MUSCULOSKELETAL  Muscle Strength and Tone: no dyskinesia, no dystonia, no tremor, no tic  Abnormal Involuntary Movements: No  Gait and Station: non-ataxic     PSYCHIATRIC   Level of Consciousness: awake and alert   Orientation: person, place, time and situation  Grooming: Hospital garb and Well groomed  Psychomotor Behavior: normal, cooperative, eye contact normal, no PMA/R  Speech: normal tone, normal rate, normal pitch, normal volume  Language: grossly intact, able to name, able to repeat  Mood: "good"  Affect: Even  Thought Process: linear and logical; less  guraded   Associations: intact, no juliette   Thought Content: DENIES homicidal ideation, suicidal ideation and delusions  Perceptions: less AH  Memory: Able to recall past events, Remote intact and Recent intact  Attention: Decreased  Fund of Knowledge: Aware of current events and Vocabulary appropriate   Estimate if Intelligence:  Above average based on work/education history, vocabulary and mental status exam  Insight: intact - has awareness of illness  Judgment: improving- less behavior issues         DIAGNOSTIC TESTING   Laboratory Results  No results found for this or any previous visit (from the past 24 hour(s)).        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      Psychosocial stressors     Hyperglycemia  Hypokalemia          PROBLEM LIST AND " MANAGEMENT PLANS       Depression with psychosis: pt counseled  -started trial of lexapro 5 mg po q day -increased to 10 mg po q day; declines further increases  -started trial of abilify 2 mg po q day -switched to risperdal 1 mg po BID- increased to 1/2; increased to/conitnue at 2 mg po BID; will conitue to change/optimze as indicated     Anxiety: pt counseled  - lexapro as above  - started adjunctive klonopin 0.5 mg po BID- increased to/continue 1 mg po BID  -decreased to 0.5 mg PO BID  - decrease to 0.5 mg PO qhs -decreased to 0.25 mg PO qhs  -stopped  - started trazodone 100 mg PO qhs for sleep adjunct  -decreased to 50 mg PO qhs  - vistaril prn    Insomnia: pt counseled  - vistaril prn  - klonopin as above     Psychosocial stressors: pt counseled  - SW consulted and assisting with resources     Hyperglycemia: pt counseled  - check HgA1c-  WNL     Hypokalemia: pt counseled  - FM consulted   - monitor/recheck    Constipation  - started colace 100 mg PO qd  -increase to BID        PRESCRIPTION DRUG MANAGEMENT  Compliance: yes  Side Effects: no  Regimen Adjustments: see above     Discussed diagnosis, risks and benefits of proposed treatment vs alternative treatments vs no treatment, potential side effects of these treatments and the inherent unpredictability of treatment. The patient expresses understanding of the above and displays the capacity to agree with this treatment given said understanding. Patient also agrees that, currently, the benefits outweigh the risks and would like to pursue/continue treatment at this time.        DIAGNOSTIC TESTING  Labs reviewed with patient; follow up pending labs     Disposition:  -Will attempt to obtain outside psychiatric records if available  -SW to assist with aftercare planning and collateral  -Once stable discharge home with outpatient follow up care and/or rehab  -Continue inpatient treatment under a PEC and/or CEC for danger to self/ danger to others/grave disability as  evident by significant psychotic thought disorder, danger to self, gravely disabled and suicidal ideation      DISCHARGE PLANNING  Expected Disposition Plan: Home or Self Care      NEED FOR CONTINUED HOSPITALIZATION  Psychiatric illness continues to pose a potential threat to life or bodily function, of self or others, thereby requiring the need for continued inpatient psychiatric hospitalization: Yes, due to: significant psychotic thought disorder, danger to self and suicidal ideation, as evidenced by:  Ongoing concerns with active SI with plan to OD/Hang Self., Ongoing concerns with SI., Ongoing concerns with command hallucinations to hit/harm others. and Ongoing concerns with perceptual aberrancy and paranoid persecutory delusions leading to potential harm of self or others.    Protective inpatient pyschiatric hospitalization required while a safe disposition plan is enacted: Yes    Patient stabilized and ready for discharge from inpatient psychiatric unit: No        STAFF:   Omar Linton III, MD  Psychiatry

## 2022-01-03 PROBLEM — F33.3 MDD (MAJOR DEPRESSIVE DISORDER), RECURRENT, SEVERE, WITH PSYCHOSIS: Status: ACTIVE | Noted: 2022-01-03

## 2022-01-03 PROCEDURE — 25000003 PHARM REV CODE 250: Performed by: PSYCHIATRY & NEUROLOGY

## 2022-01-03 PROCEDURE — 90833 PR PSYCHOTHERAPY W/PATIENT W/E&M, 30 MIN (ADD ON): ICD-10-PCS | Mod: ,,, | Performed by: PSYCHIATRY & NEUROLOGY

## 2022-01-03 PROCEDURE — 99233 PR SUBSEQUENT HOSPITAL CARE,LEVL III: ICD-10-PCS | Mod: ,,, | Performed by: PSYCHIATRY & NEUROLOGY

## 2022-01-03 PROCEDURE — 11400000 HC PSYCH PRIVATE ROOM

## 2022-01-03 PROCEDURE — 25000003 PHARM REV CODE 250: Performed by: STUDENT IN AN ORGANIZED HEALTH CARE EDUCATION/TRAINING PROGRAM

## 2022-01-03 PROCEDURE — 99233 SBSQ HOSP IP/OBS HIGH 50: CPT | Mod: ,,, | Performed by: PSYCHIATRY & NEUROLOGY

## 2022-01-03 PROCEDURE — 90833 PSYTX W PT W E/M 30 MIN: CPT | Mod: ,,, | Performed by: PSYCHIATRY & NEUROLOGY

## 2022-01-03 RX ORDER — TRAZODONE HYDROCHLORIDE 100 MG/1
100 TABLET ORAL NIGHTLY
Status: DISCONTINUED | OUTPATIENT
Start: 2022-01-03 | End: 2022-01-04

## 2022-01-03 RX ORDER — ESCITALOPRAM OXALATE 10 MG/1
10 TABLET ORAL DAILY
Qty: 30 TABLET | Refills: 2 | Status: SHIPPED | OUTPATIENT
Start: 2022-01-04 | End: 2022-01-09 | Stop reason: HOSPADM

## 2022-01-03 RX ORDER — DOCUSATE SODIUM 100 MG/1
100 CAPSULE, LIQUID FILLED ORAL 2 TIMES DAILY
Qty: 60 CAPSULE | Refills: 2 | Status: SHIPPED | OUTPATIENT
Start: 2022-01-03 | End: 2022-01-09

## 2022-01-03 RX ORDER — RISPERIDONE 2 MG/1
2 TABLET ORAL 2 TIMES DAILY
Qty: 60 TABLET | Refills: 2 | Status: SHIPPED | OUTPATIENT
Start: 2022-01-03 | End: 2022-01-09 | Stop reason: HOSPADM

## 2022-01-03 RX ORDER — TRAZODONE HYDROCHLORIDE 100 MG/1
100 TABLET ORAL NIGHTLY
Qty: 30 TABLET | Refills: 2 | Status: SHIPPED | OUTPATIENT
Start: 2022-01-03 | End: 2022-01-09 | Stop reason: HOSPADM

## 2022-01-03 RX ORDER — HYDROXYZINE PAMOATE 50 MG/1
50 CAPSULE ORAL EVERY 6 HOURS PRN
Qty: 60 CAPSULE | Refills: 2 | Status: SHIPPED | OUTPATIENT
Start: 2022-01-03 | End: 2022-01-09 | Stop reason: HOSPADM

## 2022-01-03 RX ADMIN — HYDROXYZINE PAMOATE 50 MG: 50 CAPSULE ORAL at 08:01

## 2022-01-03 RX ADMIN — DOCUSATE SODIUM 100 MG: 100 CAPSULE ORAL at 08:01

## 2022-01-03 RX ADMIN — RISPERIDONE 2 MG: 2 TABLET ORAL at 08:01

## 2022-01-03 RX ADMIN — ESCITALOPRAM OXALATE 10 MG: 10 TABLET ORAL at 08:01

## 2022-01-03 RX ADMIN — TRAZODONE HYDROCHLORIDE 100 MG: 100 TABLET ORAL at 08:01

## 2022-01-03 RX ADMIN — HYDROXYZINE PAMOATE 50 MG: 50 CAPSULE ORAL at 01:01

## 2022-01-03 RX ADMIN — FOLIC ACID 1 MG: 1 TABLET ORAL at 08:01

## 2022-01-03 RX ADMIN — THERA TABS 1 TABLET: TAB at 08:01

## 2022-01-03 RX ADMIN — ALUMINUM HYDROXIDE, MAGNESIUM HYDROXIDE, AND SIMETHICONE 30 ML: 200; 200; 20 SUSPENSION ORAL at 06:01

## 2022-01-03 NOTE — NURSING
"Patient received Vistaril 50 mg for insomnia. Told nurse he smells "burnt toast" and he fears he is having a stroke. Patient denies ever having a seizure. Patient was told that smelling burnt toast is not the precursor of a stroke. Patient denies head pain, dizziness, weakness anywhere in body.   "

## 2022-01-03 NOTE — CARE UPDATE
[facility]  Encounter Date: 2021  9:50 AM    Discharge Date No discharge date for patient encounter.   Hospital Account: [id number]    MRN: [id number]   Guarantor: ANJUM JAMES   Contact Serial #: [id number]         ENCOUNTER             Patient Class: IP Psych   Unit: [facility]*   Hospital Service: Psychiatry   Bed: 214   Admitting Provider: Randy Matthews Md   Referring Physician: Vlad Wright   Attending Provider: Randy Matthews Md   Adm Diagnosis: Depression with suicidal*      PATIENT                  Name: ANJUM JAMES : 2000 (21 yrs)   Address: [address] [address] Sex: Male   City: [place] [address]       Primary Care Provider: Sierra Verdin MD         Primary Phone: [phone]   EMERGENCY CONTACT   Contact Name Legal Guardian? Relationship to Patient Home Phone Work Phone Mobile Phone   1. Erica Willis  2. Randy James      Mother  Father           [phone]      GUARANTOR                  Guarantor: ANJUM JAMES       : 2000   Address: [address] [place] [address]    Sex: [place]     [place] [address] Guarantor  Type: B/H   Relation to Patient: Self         Home Phone: [phone]   Guarantor ID: [id number]         Work Phone:     GUARANTOR EMPLOYER     Employer:             Status: STUDENT -*      COVERAGE          PRIMARY INSURANCE   Payor / Plan: [facility] CHOICE PLUS       Group Number: [id number]       Subscriber Name: ERICA WILLIS Subscriber : 11/10/1968   Subscriber ID: [id number] Pat. Rel. to Subscriber: Child   Insurance Address: [facility] [id number]  [place] [address]       SECONDARY INSURANCE   Payor / Plan: - No Secondary Coverage -       Group Number:         Subscriber Name:   Subscriber :     Subscriber ID:   Pat. Rel. to Subscriber:     Insurance Address:            Contact Serial # ([id number])         January 3, 2022    Chart ID ([id number])

## 2022-01-03 NOTE — PSYCH
"Patient stated, "My [place] address is no longer valid. I will be going to [address] [place] [address]. It is in [facility] in [place]. The zip code is [facility]. My mom lives with there. Her telephone number is [phone]." Patient stated, "I want to go to [facility] in [place]. I plan that plan with Dr. Linton."           Plan    To continue assisting the patient with discharge planning.   "

## 2022-01-03 NOTE — PSYCH
Erica Greenwood was contacted and given information about the patient's co-payment with [facility]; she stated that she and her  can afford the fifty dollar a day co-pay for the next fourteen weeks. She stated that all she is suggesting is that she be called when the patient is discharged given her information as to where the patient needs to go and how often and when he would start. This counselor informed Ms. Dickinson who acknowledged that patient's mother will be given this information when he is discharged.           Plan    To assist patient with discharge planning.

## 2022-01-03 NOTE — PSYCH
Called Olvin' Mother at [phone] at 3:52pm on 1/3/2022 to inform her about his aftercare appointment, as requested.

## 2022-01-03 NOTE — PLAN OF CARE
Plan of care reviewed. Denies intent to harm self or others at this time. Accepts snacks and medications. Gait steady, no falls. Limited interactions noted with staff and peers. Appears very somber and quiet. Denies hallucinations and delusions. Promoted individualized safety plan, reality-based interactions, effective coping strategies, and impulse control. Will continue precautions and monitor for safety.

## 2022-01-03 NOTE — PROGRESS NOTES
"PSYCHIATRY DAILY INPATIENT PROGRESS NOTE  SUBSEQUENT HOSPITAL VISIT      ENCOUNTER DATE: 1/3/2022  SITE: [facility]      DATE OF ADMISSION: 12/21/2021  9:50 AM  LENGTH OF STAY: 13 days        CHIEF COMPLAINT   Olvin Posey is a 21 y.o. male, seen during daily vasques rounds on the inpatient unit.  Olvin Posey presented with the chief complaint of depression/SI, "I was manipulating my family.. myself.. I'm glad they found out.""    The patient was seen and examined. The chart was reviewed.     Reviewed notes from Rns, MD and LPC and labs from the last 24 hours.    The patient's case was discussed with the treatment team/care providers today including Rns, CTRS and LPC    Staff reports no behavioral or management issues.     The patient has been compliant with treatment.        Subjective 01/03/2022    Yesterday, the patient reports "I feel good." Patient agreeable to abstain from any future cannabis usage. Recommended IOP after discharge, patient considering.He remains severely but less depressed, anxious and psychotic. He has been having less AH; he is less paranoid. He attempted to kill himself on the hospital unit last week while admitted, stabbed his neck above vasculature multiple times with a pencil while experiencing psychotic depression.    Today, he reports that he is making progress but is nervous about going home tomorrow, He notes lessening depression and denied psychosis this AM. His sleep has been poor- he requests and increase in trazodone.   -overall, he is making progress and should be stable for discharge home tomorrow; he remains interested in f/u with an IOP    The patient denies any side effects to medications.        Psychiatric ROS (observed, reported, or endorsed/denied):  Depressed mood - improving steadily  Interest/pleasure/anhedonia: improving steadily  Guilt/hopelessness/worthlessness - improving steadily  Changes in Sleep - decreasing slowly  Changes in Appetite - " improving steadily  Changes in Concentration - improving steadily  Changes in Energy - improving steadily  PMA/R- denies  Suicidal- active/passive ideations - denies  Homicidal ideations: active/passive ideations - denies    Hallucinations - improving steadily  Delusions - improving steadily  Disorganized behavior - denies  Disorganized speech - denies  Negative symptoms - denies    Elevated mood - denies  Decreased need for sleep - denies  Grandiosity - denies  Racing thoughts - denies  Impulsivity - denies  Irritability- denies  Increased energy - denies  Distractibility - denies  Increase in goal-directed activity or PMA- denies    Symptoms of TEENA - improving steadily  Symptoms of Panic Disorder- denies  Symptoms of PTSD - denies        Psychotherapy:  · Target symptoms: mood disorder, psychosis  · Why chosen therapy is appropriate versus another modality: relevant to diagnosis, patient responds to this modality, evidence based practice  · Outcome monitoring methods: self-report, observation  · Therapeutic intervention type: insight oriented psychotherapy, behavior modifying psychotherapy, supportive psychotherapy, interactive psychotherapy  · Topics discussed/themes: difficulty managing affect in interpersonal relationships, building skills sets for symptom management, symptom recognition, substance abuse  · The patient's response to the intervention is accepting. The patient's progress toward treatment goals is good.   · Duration of intervention: 18 minutes.        Overall progress: Patient is showing moderate improvement            Medical ROS  General ROS: negative  Ophthalmic ROS: negative  ENT ROS: negative  Allergy and Immunology ROS: negative  Hematological and Lymphatic ROS: negative  Endocrine ROS: negative  Respiratory ROS: no cough, shortness of breath, or wheezing  Cardiovascular ROS: no chest pain or dyspnea on exertion  Gastrointestinal ROS: no abdominal pain, change in bowel habits, or black or  "bloody stools  Genito-Urinary ROS: no dysuria, trouble voiding, or hematuria  Musculoskeletal ROS: negative  Neurological ROS: no TIA or stroke symptoms  Dermatological ROS: negative        PAST MEDICAL HISTORY   No past medical history on file.        PSYCHOTROPIC MEDICATIONS   Scheduled Meds:   docusate sodium  100 mg Oral BID    EScitalopram oxalate  10 mg Oral Daily    folic acid  1 mg Oral Daily    multivitamin  1 tablet Oral Daily    risperiDONE  2 mg Oral BID    traZODone  50 mg Oral QHS     Continuous Infusions:  PRN Meds:.acetaminophen, aluminum-magnesium hydroxide-simethicone, hydrOXYzine pamoate, loperamide, magnesium hydroxide 400 mg/5 ml, nicotine, OLANZapine **AND** OLANZapine      EXAMINATION    VITALS   Vitals:    01/02/22 0745 01/02/22 0800 01/02/22 2000 01/03/22 0800   BP: (!) 115/56  132/79 113/69   BP Location: Right arm  Left arm Right arm   Patient Position: Sitting  Sitting Sitting   Pulse: 85  82 93   Resp: 20  12 18   Temp: 97 °F (36.1 °C)  97.7 °F (36.5 °C) 97.9 °F (36.6 °C)   TempSrc: Temporal  Temporal Temporal   Weight:  90.8 kg (200 lb 1.1 oz)     Height:           Body mass index is 24.35 kg/m².        CONSTITUTIONAL  General Appearance: unremarkable, age appropriate, normal weight, well nourished     MUSCULOSKELETAL  Muscle Strength and Tone: no dyskinesia, no dystonia, no tremor, no tic  Abnormal Involuntary Movements: No  Gait and Station: non-ataxic     PSYCHIATRIC   Level of Consciousness: awake and alert   Orientation: person, place, time and situation  Grooming: Hospital garb and Well groomed  Psychomotor Behavior: normal, cooperative, eye contact normal, no PMA/R  Speech: normal tone, normal rate, normal pitch, normal volume  Language: grossly intact, able to name, able to repeat  Mood: "good"  Affect: Even, improving  Thought Process: linear and logical; less  guraded   Associations: intact, no juliette   Thought Content: DENIES homicidal ideation, suicidal ideation and " delusions  Perceptions: less AH  Memory: Able to recall past events, Remote intact and Recent intact  Attention: Decreased  Fund of Knowledge: Aware of current events and Vocabulary appropriate   Estimate if Intelligence:  Above average based on work/education history, vocabulary and mental status exam  Insight: intact/fair - has awareness of illness;accepting tx  Judgment: improving- no behavior issues; compliant, calm, cooperative         DIAGNOSTIC TESTING   Laboratory Results  No results found for this or any previous visit (from the past 24 hour(s)).        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      Psychosocial stressors     Hyperglycemia  Hypokalemia          PROBLEM LIST AND MANAGEMENT PLANS       Depression with psychosis: pt counseled  -started trial of lexapro 5 mg po q day -increased to/continue 10 mg po q day; declines further increases  -started trial of abilify 2 mg po q day -switched to risperdal 1 mg po BID- increased to 1/2; increased to/conitnue at 2 mg po BID; will conitue to change/optimze as indicated     Anxiety: pt counseled  - lexapro as above  - started adjunctive klonopin 0.5 mg po BID- increased to/continue 1 mg po BID  -decreased to 0.5 mg PO BID  - decrease to 0.5 mg PO qhs -decreased to 0.25 mg PO qhs  -stopped  - started trazodone 100 mg PO qhs for sleep adjunct  -decreased to 50 mg PO qhs- increase back to 100 mg po q HS  - vistaril prn    Insomnia: pt counseled  - vistaril prn  - klonopin as above     Psychosocial stressors: pt counseled  - SW consulted and assisting with resources     Hyperglycemia: pt counseled  - check HgA1c-  WNL     Hypokalemia: pt counseled  - FM consulted   - monitor/recheck    Constipation  - started colace 100 mg PO qd  -increase to/continue at 100 mg BID        PRESCRIPTION DRUG MANAGEMENT  Compliance: yes  Side Effects: no  Regimen  Adjustments: see above     Discussed diagnosis, risks and benefits of proposed treatment vs alternative treatments vs no treatment, potential side effects of these treatments and the inherent unpredictability of treatment. The patient expresses understanding of the above and displays the capacity to agree with this treatment given said understanding. Patient also agrees that, currently, the benefits outweigh the risks and would like to pursue/continue treatment at this time.        DIAGNOSTIC TESTING  Labs reviewed with patient; follow up pending labs     Disposition:  -Will attempt to obtain outside psychiatric records if available  -SW to assist with aftercare planning and collateral  -Once stable discharge home with outpatient follow up care and/or rehab  -Continue inpatient treatment under a PEC and/or CEC for danger to self/ danger to others/grave disability as evident by significant psychotic thought disorder, danger to self, gravely disabled and suicidal ideation      DISCHARGE PLANNING  Expected Disposition Plan: Home or Self Care- pt to be discharged tomorrow       NEED FOR CONTINUED HOSPITALIZATION  Psychiatric illness continues to pose a potential threat to life or bodily function, of self or others, thereby requiring the need for continued inpatient psychiatric hospitalization: Yes, due to: significant psychotic thought disorder, danger to self and suicidal ideation, as evidenced by:  Ongoing concerns with active SI with plan to OD/Hang Self., Ongoing concerns with SI., Ongoing concerns with command hallucinations to hit/harm others. and Ongoing concerns with perceptual aberrancy and paranoid persecutory delusions leading to potential harm of self or others.    Protective inpatient pyschiatric hospitalization required while a safe disposition plan is enacted: Yes    Patient stabilized and ready for discharge from inpatient psychiatric unit: No        STAFF:   Randy Matthews MD  Psychiatry

## 2022-01-03 NOTE — PROGRESS NOTES
"   01/03/22 1100   [facility] Therapy   Group Name Leisure Education   Specific Interventions Coping Skills Training  (the whole benefits of leisure activities)   Participation Level Appropriate;Sharing   Participation Quality Drowsy;Cooperative   Insight/Motivation Good   Affect/Mood Display Appropriate   Cognition Oriented   Psychomotor WNL   Patient reports an "alright, sleepy" mood, states "I'm a little under slept and low on energy." Patient reports his concentration was slowed down a little today, and states he thinks he needs a higher dosage of medicine to sleep.  "

## 2022-01-03 NOTE — PSYCH
Faxed information to [facility] in [place] for review for acceptance.  Faxed info at 1:26 pm on 1/3/2022.  Will await decision for acceptance.

## 2022-01-03 NOTE — PSYCH
Patient signed consent to have his medical record sent to the following after-care appointment:      [facility] [address] [address] [phone]        Patient for a second time wanted it emphasized that the following address is his new address:     [address] [place] [id number] [phone]   : Erica Greenwood        Patient is requesting that [facility] be made aware of his new address.       To continue to assist patient with discharge planning is the goal.

## 2022-01-03 NOTE — PLAN OF CARE
Pt taking scheduled medications w/out difficulty;  Seizure /  Fall  prevention in place;   MVC Monitored per policy. Denies SI / HI; No C/O pain  No sleep disturbances as reported by PM shift.     Upon morning assessment patient was    awake      calm     accepting of care        Denies A/VH. No RIS observed.   Contracted for safety.   Hygiene: Refused to shower  Avoids social contact, No Interaction with peers noted.  (Mood/Behavior/Emotion): hypoactive  sad  withdrawn calm cooperative anxious guarded labile depressed thoughts linear  Ate meals/snack.     Did   attend/participate in groups.  Continue POC.

## 2022-01-03 NOTE — NURSING
. Affect congruent,,appears unshaven, well kempt.& appropriate.. Thoughts linear,. response lag exhibited. Denies A/V hallucinations,SI / HI, verbally contracted for safety.  Monitor q 15 minutes per protocol.

## 2022-01-03 NOTE — PLAN OF CARE
Lying quietly in bed, awake. Slept 5 hours thus far with multiple interruptions. Safety precautions maintained. Rounds done every 15 minutes. Bed is fixed in low position and room is uncluttered and pathways are clear.

## 2022-01-04 PROCEDURE — 99233 SBSQ HOSP IP/OBS HIGH 50: CPT | Mod: ,,, | Performed by: PSYCHIATRY & NEUROLOGY

## 2022-01-04 PROCEDURE — 11400000 HC PSYCH PRIVATE ROOM

## 2022-01-04 PROCEDURE — 25000003 PHARM REV CODE 250: Performed by: STUDENT IN AN ORGANIZED HEALTH CARE EDUCATION/TRAINING PROGRAM

## 2022-01-04 PROCEDURE — 90833 PR PSYCHOTHERAPY W/PATIENT W/E&M, 30 MIN (ADD ON): ICD-10-PCS | Mod: ,,, | Performed by: PSYCHIATRY & NEUROLOGY

## 2022-01-04 PROCEDURE — 99233 PR SUBSEQUENT HOSPITAL CARE,LEVL III: ICD-10-PCS | Mod: ,,, | Performed by: PSYCHIATRY & NEUROLOGY

## 2022-01-04 PROCEDURE — 25000003 PHARM REV CODE 250: Performed by: PSYCHIATRY & NEUROLOGY

## 2022-01-04 PROCEDURE — 90833 PSYTX W PT W E/M 30 MIN: CPT | Mod: ,,, | Performed by: PSYCHIATRY & NEUROLOGY

## 2022-01-04 RX ORDER — RISPERIDONE 3 MG/1
3 TABLET ORAL 2 TIMES DAILY
Status: DISCONTINUED | OUTPATIENT
Start: 2022-01-04 | End: 2022-01-09 | Stop reason: HOSPADM

## 2022-01-04 RX ORDER — HYDROXYZINE PAMOATE 50 MG/1
50 CAPSULE ORAL EVERY 6 HOURS PRN
Status: DISCONTINUED | OUTPATIENT
Start: 2022-01-04 | End: 2022-01-09 | Stop reason: HOSPADM

## 2022-01-04 RX ORDER — CLONAZEPAM 1 MG/1
1 TABLET ORAL 2 TIMES DAILY
Status: DISCONTINUED | OUTPATIENT
Start: 2022-01-04 | End: 2022-01-06

## 2022-01-04 RX ORDER — HYDROXYZINE PAMOATE 50 MG/1
100 CAPSULE ORAL NIGHTLY PRN
Status: DISCONTINUED | OUTPATIENT
Start: 2022-01-04 | End: 2022-01-09 | Stop reason: HOSPADM

## 2022-01-04 RX ADMIN — RISPERIDONE 2 MG: 2 TABLET ORAL at 08:01

## 2022-01-04 RX ADMIN — ESCITALOPRAM OXALATE 10 MG: 10 TABLET ORAL at 08:01

## 2022-01-04 RX ADMIN — DOCUSATE SODIUM 100 MG: 100 CAPSULE ORAL at 08:01

## 2022-01-04 RX ADMIN — CLONAZEPAM 1 MG: 1 TABLET ORAL at 09:01

## 2022-01-04 RX ADMIN — THERA TABS 1 TABLET: TAB at 08:01

## 2022-01-04 RX ADMIN — HYDROXYZINE PAMOATE 50 MG: 50 CAPSULE ORAL at 02:01

## 2022-01-04 RX ADMIN — RISPERIDONE 3 MG: 3 TABLET ORAL at 08:01

## 2022-01-04 RX ADMIN — CLONAZEPAM 1 MG: 1 TABLET ORAL at 08:01

## 2022-01-04 RX ADMIN — FOLIC ACID 1 MG: 1 TABLET ORAL at 08:01

## 2022-01-04 NOTE — NURSING
Pt is awake at this time, states he feels better, slept 3hrs with 4 awakenings. NAD. Resp even and unlabored.Pathways clear,bed in low position. Q 15 min safety check ongoing.All precautions maintained.

## 2022-01-04 NOTE — PLAN OF CARE
"Pt states that he feels "good" today, spending majority of time in room and activity room. Interacting appropriately with staff/peers. Verbalizing readiness for discharge, stating that he is going to spend time with his family. Denies SI/HI at this time. Denies hallucinations. Medication compliant. Appetite adequate. PRN Visteril administered to aid with sleep. Remains calm and cooperative. NADN. Safety precautions remain in place.  "

## 2022-01-04 NOTE — PSYCH
Patient's mother was called early this morning and told that the patient's discharge has been postponed. This information was told to the Medical Director's , Ms. Dickinson.

## 2022-01-04 NOTE — PSYCH
Called [facility] in [place] to cancel patients appointment as he will not be discharging today.  Spoke to Juan.  She asked that writer call her with an update on Friday so that [facility] place can be held for the program.

## 2022-01-04 NOTE — PLAN OF CARE
"Pt states that he feels "tired" today because he has "not slept in over a day." Denies SI/HI at this time. Denies hallucinations at this time. +thought blocking and lagged response. Appetite adequate. Reports sleep disturbances. Remains calm and cooperative. NADN. Safety precautions remain in place.  "

## 2022-01-04 NOTE — PROGRESS NOTES
"PSYCHIATRY DAILY INPATIENT PROGRESS NOTE  SUBSEQUENT HOSPITAL VISIT      ENCOUNTER DATE: 1/4/2022  SITE: [facility]      DATE OF ADMISSION: 12/21/2021  9:50 AM  LENGTH OF STAY: 14 days        CHIEF COMPLAINT   Olvin Posey is a 21 y.o. male, seen during daily vasques rounds on the inpatient unit.  Olvin Posey presented with the chief complaint of depression/SI, "I was manipulating my family.. myself.. I'm glad they found out.""    The patient was seen and examined. The chart was reviewed.     Reviewed notes from Rns, CTRS and LPC and labs from the last 24 hours.    The patient's case was discussed with the treatment team/care providers today including Rns, CTRS and LPC    Staff reports no behavioral or management issues.     The patient has been compliant with treatment.      Subjective 01/04/2022    Yesterday, the patient reported  he is making progress but is nervous about going home tomorrow, He notes lessening depression and denied psychosis this AM. His sleep has been poor- he requests and increase in trazodone.   -overall, he is making progress and should be stable for discharge home tomorrow; he remains interested in f/u with an IOP    Today, he notes continued poor sleep and worsening AH. He slept 3 broken hours last night and reported to staff that he was having severe AH. "I'm not doing good.. I can't go home yet." He was willing to sign in FVA to continue the recommended tx.       The patient denies any side effects to medications.        Psychiatric ROS (observed, reported, or endorsed/denied):  Depressed mood - fluctuating  Interest/pleasure/anhedonia: improving steadily  Guilt/hopelessness/worthlessness - improving steadily  Changes in Sleep - worsening  Changes in Appetite - improving steadily  Changes in Concentration - improving steadily  Changes in Energy - improving steadily  PMA/R- denies  Suicidal- active/passive ideations - denies  Homicidal ideations: active/passive ideations " - denies    Hallucinations - worsening  Delusions - improving steadily  Disorganized behavior - denies  Disorganized speech - denies  Negative symptoms - denies    Elevated mood - denies  Decreased need for sleep - denies  Grandiosity - denies  Racing thoughts - denies  Impulsivity - denies  Irritability- denies  Increased energy - denies  Distractibility - denies  Increase in goal-directed activity or PMA- denies    Symptoms of TEENA - fluctuating  Symptoms of Panic Disorder- denies  Symptoms of PTSD - denies        Psychotherapy:  · Target symptoms: mood disorder, psychosis  · Why chosen therapy is appropriate versus another modality: relevant to diagnosis, patient responds to this modality, evidence based practice  · Outcome monitoring methods: self-report, observation  · Therapeutic intervention type: insight oriented psychotherapy, behavior modifying psychotherapy, supportive psychotherapy, interactive psychotherapy  · Topics discussed/themes: difficulty managing affect in interpersonal relationships, building skills sets for symptom management, symptom recognition, substance abuse  · The patient's response to the intervention is accepting. The patient's progress toward treatment goals is fair.   · Duration of intervention: 18 minutes.        Overall progress: Patient is showing mild improvement             Medical ROS  General ROS: negative  Ophthalmic ROS: negative  ENT ROS: negative  Allergy and Immunology ROS: negative  Hematological and Lymphatic ROS: negative  Endocrine ROS: negative  Respiratory ROS: no cough, shortness of breath, or wheezing  Cardiovascular ROS: no chest pain or dyspnea on exertion  Gastrointestinal ROS: no abdominal pain, change in bowel habits, or black or bloody stools  Genito-Urinary ROS: no dysuria, trouble voiding, or hematuria  Musculoskeletal ROS: negative  Neurological ROS: no TIA or stroke symptoms  Dermatological ROS: negative        PAST MEDICAL HISTORY   No past medical  "history on file.        PSYCHOTROPIC MEDICATIONS   Scheduled Meds:   docusate sodium  100 mg Oral BID    EScitalopram oxalate  10 mg Oral Daily    folic acid  1 mg Oral Daily    multivitamin  1 tablet Oral Daily    risperiDONE  2 mg Oral BID    traZODone  100 mg Oral QHS     Continuous Infusions:  PRN Meds:.acetaminophen, aluminum-magnesium hydroxide-simethicone, hydrOXYzine pamoate, loperamide, magnesium hydroxide 400 mg/5 ml, nicotine, OLANZapine **AND** OLANZapine      EXAMINATION    VITALS   Vitals:    01/02/22 2000 01/03/22 0800 01/03/22 2000 01/04/22 0800   BP: 132/79 113/69 (!) 145/83 135/81   BP Location: Left arm Right arm Right arm Left arm   Patient Position: Sitting Sitting Sitting Sitting   Pulse: 82 93 104 95   Resp: 12 18 18 18   Temp: 97.7 °F (36.5 °C) 97.9 °F (36.6 °C) 97.1 °F (36.2 °C) 97.7 °F (36.5 °C)   TempSrc: Temporal Temporal Temporal Temporal   Weight:       Height:           Body mass index is 24.35 kg/m².        CONSTITUTIONAL  General Appearance: unremarkable, age appropriate, normal weight, well nourished     MUSCULOSKELETAL  Muscle Strength and Tone: no dyskinesia, no dystonia, no tremor, no tic  Abnormal Involuntary Movements: No  Gait and Station: non-ataxic     PSYCHIATRIC   Level of Consciousness: awake and alert   Orientation: person, place, time and situation  Grooming: Hospital garb and Well groomed  Psychomotor Behavior: normal, cooperative, eye contact normal, no PMA/R  Speech: normal tone, normal rate, normal pitch, normal volume  Language: grossly intact, able to name, able to repeat  Mood: "not good"  Affect: Even, tired, guarded  Thought Process: linear and logical; less  guraded   Associations: intact, no juliette   Thought Content: DENIES homicidal ideation, suicidal ideation and delusions  Perceptions: increased AH; no VH  Memory: Able to recall past events, Remote intact and Recent intact  Attention: Decreased  Fund of Knowledge: Aware of current events and Vocabulary " appropriate   Estimate if Intelligence:  Above average based on work/education history, vocabulary and mental status exam  Insight: intact/fair - has awareness of illness;accepting tx  Judgment: improving- no behavior issues; compliant, calm, cooperative         DIAGNOSTIC TESTING   Laboratory Results  No results found for this or any previous visit (from the past 24 hour(s)).        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      Psychosocial stressors     Hyperglycemia  Hypokalemia          PROBLEM LIST AND MANAGEMENT PLANS       Depression with psychosis: pt counseled  -started trial of lexapro 5 mg po q day -increased to/continue 10 mg po q day; increase to 15 mg po q day starting tomorrow  -started trial of abilify 2 mg po q day -switched to risperdal 1 mg po BID- increased to 1/2; increased to/conitnue at 2 mg po BID- increase to 2/3 today then 3 mg po BID tomorrow; will conitue to change/optimze as indicated     Anxiety: pt counseled  - lexapro as above  - started adjunctive klonopin 0.5 mg po BID- increased to/continue 1 mg po BID  -decreased to 0.5 mg PO BID  - decrease to 0.5 mg PO qhs -decreased to 0.25 mg PO qhs  -stopped; resume at 1 mg po BID  - started trazodone 100 mg PO qhs for sleep adjunct  -decreased to 50 mg PO qhs- increase back to 100 mg po q HS- stop (ineffective)  - vistaril prn    Insomnia: pt counseled  - vistaril 100 mg po q HS prn  - klonopin as above     Psychosocial stressors: pt counseled  - SW consulted and assisting with resources     Hyperglycemia: pt counseled  - check HgA1c-  WNL     Hypokalemia: pt counseled  - FM consulted   - monitor/recheck    Constipation  - started colace 100 mg PO qd  -increase to/continue at 100 mg BID        PRESCRIPTION DRUG MANAGEMENT  Compliance: yes  Side Effects: no  Regimen Adjustments: see above     Discussed diagnosis, risks and  benefits of proposed treatment vs alternative treatments vs no treatment, potential side effects of these treatments and the inherent unpredictability of treatment. The patient expresses understanding of the above and displays the capacity to agree with this treatment given said understanding. Patient also agrees that, currently, the benefits outweigh the risks and would like to pursue/continue treatment at this time.        DIAGNOSTIC TESTING  Labs reviewed with patient; follow up pending labs     Disposition:  -Will attempt to obtain outside psychiatric records if available  -SW to assist with aftercare planning and collateral  -Once stable discharge home with outpatient follow up care and/or rehab  -Continue inpatient treatment under a PEC and/or CEC for danger to self/ danger to others/grave disability as evident by significant psychotic thought disorder, danger to self, gravely disabled and suicidal ideation      DISCHARGE PLANNING  Expected Disposition Plan: Home or Self Care      NEED FOR CONTINUED HOSPITALIZATION  Psychiatric illness continues to pose a potential threat to life or bodily function, of self or others, thereby requiring the need for continued inpatient psychiatric hospitalization: Yes, due to: significant psychotic thought disorder, danger to self and suicidal ideation, as evidenced by:  Ongoing concerns with active SI with plan to OD/Hang Self., Ongoing concerns with SI., Ongoing concerns with command hallucinations to hit/harm others. and Ongoing concerns with perceptual aberrancy and paranoid persecutory delusions leading to potential harm of self or others.    Protective inpatient pyschiatric hospitalization required while a safe disposition plan is enacted: Yes    Patient stabilized and ready for discharge from inpatient psychiatric unit: No        STAFF:   Randy Matthews MD  Psychiatry

## 2022-01-04 NOTE — NURSING
Patient came to window requesting water, this nurse asked patient how he is doing, (patient has been tossing and turning), patient siad ok, when asked if he is worried, patient stated, that he is hearing voices and that he is worried about about going home, states that he feels like he is not ready yet, states that the voices are too many to hear what they are saying to him, patient administered vistaril po, and told we will see how that works and if he doesn't feel any better, he can be given the zyprexa, patient requested to have 1:1 sitter for 14 is now watching 13 also.Patient appears to be decompensating and  seems to have the same paranoia as when he was first admitted. Safety precautions maintained.

## 2022-01-05 PROCEDURE — 90833 PR PSYCHOTHERAPY W/PATIENT W/E&M, 30 MIN (ADD ON): ICD-10-PCS | Mod: ,,, | Performed by: PSYCHIATRY & NEUROLOGY

## 2022-01-05 PROCEDURE — 25000003 PHARM REV CODE 250: Performed by: PSYCHIATRY & NEUROLOGY

## 2022-01-05 PROCEDURE — 99233 SBSQ HOSP IP/OBS HIGH 50: CPT | Mod: ,,, | Performed by: PSYCHIATRY & NEUROLOGY

## 2022-01-05 PROCEDURE — 90833 PSYTX W PT W E/M 30 MIN: CPT | Mod: ,,, | Performed by: PSYCHIATRY & NEUROLOGY

## 2022-01-05 PROCEDURE — 25000003 PHARM REV CODE 250: Performed by: STUDENT IN AN ORGANIZED HEALTH CARE EDUCATION/TRAINING PROGRAM

## 2022-01-05 PROCEDURE — 99233 PR SUBSEQUENT HOSPITAL CARE,LEVL III: ICD-10-PCS | Mod: ,,, | Performed by: PSYCHIATRY & NEUROLOGY

## 2022-01-05 PROCEDURE — 11400000 HC PSYCH PRIVATE ROOM

## 2022-01-05 RX ORDER — BENZTROPINE MESYLATE 0.5 MG/1
0.5 TABLET ORAL 2 TIMES DAILY
Status: DISCONTINUED | OUTPATIENT
Start: 2022-01-05 | End: 2022-01-06

## 2022-01-05 RX ADMIN — CLONAZEPAM 1 MG: 1 TABLET ORAL at 08:01

## 2022-01-05 RX ADMIN — DOCUSATE SODIUM 100 MG: 100 CAPSULE ORAL at 08:01

## 2022-01-05 RX ADMIN — BENZTROPINE MESYLATE 0.5 MG: 0.5 TABLET ORAL at 08:01

## 2022-01-05 RX ADMIN — RISPERIDONE 3 MG: 3 TABLET ORAL at 08:01

## 2022-01-05 RX ADMIN — BENZTROPINE MESYLATE 0.5 MG: 0.5 TABLET ORAL at 10:01

## 2022-01-05 RX ADMIN — ESCITALOPRAM OXALATE 15 MG: 5 TABLET, FILM COATED ORAL at 08:01

## 2022-01-05 RX ADMIN — THERA TABS 1 TABLET: TAB at 08:01

## 2022-01-05 RX ADMIN — FOLIC ACID 1 MG: 1 TABLET ORAL at 08:01

## 2022-01-05 NOTE — PROGRESS NOTES
"PSYCHIATRY DAILY INPATIENT PROGRESS NOTE  SUBSEQUENT HOSPITAL VISIT      ENCOUNTER DATE: 1/5/2022  [facility]      DATE OF ADMISSION: 12/21/2021  9:50 AM  LENGTH OF STAY: 15 days        CHIEF COMPLAINT   Olvin Posey is a 21 y.o. male, seen during daily vasques rounds on the inpatient unit.  Olvin Posey presented with the chief complaint of depression/SI, "I was manipulating my family.. myself.. I'm glad they found out.""    The patient was seen and examined. The chart was reviewed.     Reviewed notes from Rns and LPC and labs from the last 24 hours.    The patient's case was discussed with the treatment team/care providers today including Rns, CTRS and LPC    Staff reports no behavioral or management issues.     The patient has been compliant with treatment.      Subjective 01/05/2022    Yesterday, the patient reported : continued poor sleep and worsening AH. He slept 3 broken hours last night and reported to staff that he was having severe AH. "I'm not doing good.. I can't go home yet." He was willing to sign in FVA to continue the recommended tx.     Today, he reports that he feels "slwed." he notes continued but less symptoms as documented below. He is not at baseline.     He reports vague/possible EPS (unable to specify)    Mood/psychotic symptoms persist, remain fx/bx impairing and continue to require inpatient treatment/detox.       The patient denies any side effects to medications.        Psychiatric ROS (observed, reported, or endorsed/denied):  Depressed mood - decreasing slowly  Interest/pleasure/anhedonia: improving steadily  Guilt/hopelessness/worthlessness - improving steadily  Changes in Sleep - improving steadily  Changes in Appetite - improving steadily  Changes in Concentration - improving steadily  Changes in Energy - improving steadily  PMA/R- denies  Suicidal- active/passive ideations - denies  Homicidal ideations: active/passive ideations - denies    Hallucinations - " decreasing slowly  Delusions - improving steadily  Disorganized behavior - denies  Disorganized speech - denies  Negative symptoms - denies    Elevated mood - denies  Decreased need for sleep - denies  Grandiosity - denies  Racing thoughts - denies  Impulsivity - denies  Irritability- denies  Increased energy - denies  Distractibility - denies  Increase in goal-directed activity or PMA- denies    Symptoms of TEENA - decreasing slowly  Symptoms of Panic Disorder- denies  Symptoms of PTSD - denies        Psychotherapy:  · Target symptoms: mood disorder, psychosis  · Why chosen therapy is appropriate versus another modality: relevant to diagnosis, patient responds to this modality, evidence based practice  · Outcome monitoring methods: self-report, observation  · Therapeutic intervention type: insight oriented psychotherapy, behavior modifying psychotherapy, supportive psychotherapy, interactive psychotherapy  · Topics discussed/themes: difficulty managing affect in interpersonal relationships, building skills sets for symptom management, symptom recognition, substance abuse  · The patient's response to the intervention is accepting. The patient's progress toward treatment goals is fair.   · Duration of intervention: 16 minutes.        Overall progress: Patient is showing mild improvement             Medical ROS  General ROS: negative  Ophthalmic ROS: negative  ENT ROS: negative  Allergy and Immunology ROS: negative  Hematological and Lymphatic ROS: negative  Endocrine ROS: negative  Respiratory ROS: no cough, shortness of breath, or wheezing  Cardiovascular ROS: no chest pain or dyspnea on exertion  Gastrointestinal ROS: no abdominal pain, change in bowel habits, or black or bloody stools  Genito-Urinary ROS: no dysuria, trouble voiding, or hematuria  Musculoskeletal ROS: negative  Neurological ROS: no TIA or stroke symptoms  Dermatological ROS: negative        PAST MEDICAL HISTORY   No past medical history on  "file.        PSYCHOTROPIC MEDICATIONS   Scheduled Meds:   clonazePAM  1 mg Oral BID    docusate sodium  100 mg Oral BID    EScitalopram oxalate  15 mg Oral Daily    folic acid  1 mg Oral Daily    multivitamin  1 tablet Oral Daily    risperiDONE  3 mg Oral BID     Continuous Infusions:  PRN Meds:.acetaminophen, aluminum-magnesium hydroxide-simethicone, hydrOXYzine pamoate, hydrOXYzine pamoate, loperamide, magnesium hydroxide 400 mg/5 ml, nicotine, OLANZapine **AND** OLANZapine      EXAMINATION    VITALS   Vitals:    01/03/22 2000 01/04/22 0800 01/04/22 2000 01/05/22 0729   BP: (!) 145/83 135/81 (!) 141/79 132/60   BP Location: Right arm Left arm Left arm Right arm   Patient Position: Sitting Sitting Sitting Lying   Pulse: 104 95 89 89   Resp: 18 18 18 18   Temp: 97.1 °F (36.2 °C) 97.7 °F (36.5 °C) 98.3 °F (36.8 °C) 98.3 °F (36.8 °C)   TempSrc: Temporal Temporal Temporal Temporal   Weight:       Height:           Body mass index is 24.35 kg/m².        CONSTITUTIONAL  General Appearance: unremarkable, age appropriate, normal weight, well nourished     MUSCULOSKELETAL  Muscle Strength and Tone: no dyskinesia, no dystonia, no tremor, no tic  Abnormal Involuntary Movements: No  Gait and Station: non-ataxic     PSYCHIATRIC   Level of Consciousness: awake and alert   Orientation: person, place, time and situation  Grooming: Hospital garb and Well groomed  Psychomotor Behavior: normal, cooperative, eye contact normal, no PMA/R  Speech: normal tone, normal rate, normal pitch, normal volume  Language: grossly intact, able to name, able to repeat  Mood: "slowed"  Affect: Even, tired, guarded  Thought Process: linear and logical; less  guraded   Associations: intact, no juliette   Thought Content: DENIES homicidal ideation, suicidal ideation and delusions  Perceptions: increased AH; no VH  Memory: Able to recall past events, Remote intact and Recent intact  Attention: Decreased  Fund of Knowledge: Aware of current events and " Vocabulary appropriate   Estimate if Intelligence:  Above average based on work/education history, vocabulary and mental status exam  Insight: intact/fair - has awareness of illness;accepting tx  Judgment: improving- no behavior issues; compliant, calm, cooperative         DIAGNOSTIC TESTING   Laboratory Results  No results found for this or any previous visit (from the past 24 hour(s)).        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     EPS     Psychosocial stressors     Hyperglycemia  Hypokalemia          PROBLEM LIST AND MANAGEMENT PLANS       Depression with psychosis: pt counseled  -started trial of lexapro 5 mg po q day -increased to/continue 10 mg po q day; increase to 15 mg po q day starting today  -started trial of abilify 2 mg po q day -switched to risperdal 1 mg po BID- increased to 1/2; increased to/conitnue at 2 mg po BID- increase to 2/3- increase to 3 mg po BID today; will conitue to change/optimze as indicated     Anxiety: pt counseled  - lexapro as above  - started adjunctive klonopin 0.5 mg po BID- increased to/continue 1 mg po BID  -decreased to 0.5 mg PO BID  - decrease to 0.5 mg PO qhs -decreased to 0.25 mg PO qhs  -stopped; resume at 1 mg po BID  - started trazodone 100 mg PO qhs for sleep adjunct  -decreased to 50 mg PO qhs- increase back to 100 mg po q HS- stopped (ineffective/possibly worsening symptoms)  - vistaril prn    EPS: pt counseled  -start cogentin 0.5 mg po BID    Insomnia: pt counseled  - vistaril 100 mg po q HS prn  - klonopin as above     Psychosocial stressors: pt counseled  - SW consulted and assisting with resources     Hyperglycemia: pt counseled  - check HgA1c-  WNL     Hypokalemia: pt counseled  - FM consulted   - monitor/recheck    Constipation  - started colace 100 mg PO qd  -increase to/continue at 100 mg BID        PRESCRIPTION DRUG  MANAGEMENT  Compliance: yes  Side Effects: no  Regimen Adjustments: see above     Discussed diagnosis, risks and benefits of proposed treatment vs alternative treatments vs no treatment, potential side effects of these treatments and the inherent unpredictability of treatment. The patient expresses understanding of the above and displays the capacity to agree with this treatment given said understanding. Patient also agrees that, currently, the benefits outweigh the risks and would like to pursue/continue treatment at this time.        DIAGNOSTIC TESTING  Labs reviewed with patient; follow up pending labs     Disposition:  -Will attempt to obtain outside psychiatric records if available  -SW to assist with aftercare planning and collateral  -Once stable discharge home with outpatient follow up care and/or rehab  -Continue inpatient treatment under a PEC and/or CEC for danger to self/ danger to others/grave disability as evident by significant psychotic thought disorder, danger to self, gravely disabled and suicidal ideation      DISCHARGE PLANNING  Expected Disposition Plan: Home or Self Care- IOP once stable      NEED FOR CONTINUED HOSPITALIZATION  Psychiatric illness continues to pose a potential threat to life or bodily function, of self or others, thereby requiring the need for continued inpatient psychiatric hospitalization: Yes, due to: significant psychotic thought disorder, danger to self and suicidal ideation, as evidenced by:  Ongoing concerns with active SI with plan to OD/Hang Self., Ongoing concerns with SI., Ongoing concerns with command hallucinations to hit/harm others. and Ongoing concerns with perceptual aberrancy and paranoid persecutory delusions leading to potential harm of self or others.    Protective inpatient pyschiatric hospitalization required while a safe disposition plan is enacted: Yes    Patient stabilized and ready for discharge from inpatient psychiatric unit: No        STAFF:    Randy Matthews MD  Psychiatry

## 2022-01-05 NOTE — NURSING
Pt sleeping at this time, slept 7.5 hrs with 1 awakenings. NAD. Resp even and unlabored.Pathways clear,bed in low position. Q 15 min safety check ongoing.All precautions maintained.

## 2022-01-05 NOTE — PLAN OF CARE
Recommendations     Recommendation:   1. Continue regular diet w/ 2x portions per MD   2. Commercial beverage not needed to meet nutritional needs, but OK w/ MD approval   3. Monitor for adequate PO intake   4. RD to follow     Goals:   1. Pt to consume >75% of ENN via PO intake and commercial beverage supplementation by next RD f/u     Nutrition Goal Status: new  Communication of RD Recs: other (comment) (discussed in POC)

## 2022-01-05 NOTE — PLAN OF CARE
"Pt states that he feels "okay" today, spending time in room and activity room. Thought blocking noted. Denies SI/HI at this time. When asked about hallucinations, pt states "not at this moment." Appetite adequate. Medication complaint. Remains calm and cooperative. NADN. Safety precautions remain in place.  "

## 2022-01-05 NOTE — PLAN OF CARE
"  Problem: Adult Behavioral Health Plan of Care  Goal: Rounds/Family Conference  Flowsheets (Taken 1/5/2022 5029)  Participants:   community support services   nursing   patient   psychiatrist  Summary: Mild depressive disorder with psychosis   TREATMENT TEAM    Patient's discharge date has been postponed once due to reported patient's preference as reported to this counselor by his nurse.     Chief Complaint:    Patient stated as of today he is feeling "a bit tired." Patient got eight hours or sleep which has been an improvement from the night before last night. Patient has severe anxiety at this time.     Patient feels he has made progress with depressive disorder from major depressive disorder to a somewhat of a lessening recently.     Pt Goal(s):  Patient mentioned to this counselor that he is "open" to having as many one on one sessions as possible.       Current Progress:     Pt attended treatment team dressed in his personal clothing. Patient was well groomed. Patient continues to be stuck on some altered thoughts. Patient himself says, "My speech is slow and my thoughts are slower." Patient stated that it is better than the racing thoughts he had in the past. Patient does feel he is moving in the right direction.     Pt affect/mood; continued to be depressed with some slight lessening. Patient appears to be measuring words very carefully when verbally interacting with the treatment team members.       Program/groups:    To focus on assisting the patient in re-framing his anxieties.; to address in, counseling, feelings of worthlessness.       Revisions to Plan:    To address continued anxiety and depression issues; focus will be on identifying and taking ownership over his behavioral health safety plan.     Patient's tentative plan is to leave on Sunday and to start [facility] Monday.      "

## 2022-01-05 NOTE — PROGRESS NOTES
"   01/05/22 1045   [facility] Group Therapy   Group Name Other  (skilled activity sheet)   Participation Level None   Patient in bed with eyes half closed, under covers, reports "sleepy, tired" mood, states "I don't know if I could contribute", speech is slow, states his thoughts are slow, remained in bed resting during group and still in bed after group.  "

## 2022-01-05 NOTE — PSYCH
"Group Note      Behavior    Patient arrived late to group psychotherapy; he asked to be able to "pass" from the group psychotherapy check-in time. Patient stated that he would prefer to mainly listen. Patient mainly listened and would only make very brief comments like "I agree" and "that is a good example."       Intervention     CBT-based group psychotherapy focused on the value of making self declarations within the context of the decision making matrix. Patients were encouraged to develop their own matrixes and to make comments about what they wrote in the group session today.         Response    Patient was observed eating his lunch during group psychotherapy time. He stated he did not get a chance to eat his lunch today.         Plan    Patient engagement will continued to be promoted with focused attention on encouraging him to be more verbal, if possible.   "

## 2022-01-05 NOTE — PLAN OF CARE
Patient guarded, quiet, and withdrawn.  Denies intentions to harm self and/or others at this time. Denies auditory and/or visual hallucinations.  Affect and emotion blunted and somber.  Thoughts are impoverished with soft and quiet, slow speech.  Minimal appropriate interactions with select peers and staff.  Accepts meals and medications. Discussed medication and plan of care; patient needs continued education and reinforcement.    Patient sleeping in room most of the day.

## 2022-01-05 NOTE — PROGRESS NOTES
"  [facility] - Behavioral Health  Adult Nutrition  Progress Note    SUMMARY     Recommendations    Recommendation:   1. Continue regular diet w/ 2x portions per MD   2. Commercial beverage not needed to meet nutritional needs, but OK w/ MD approval   3. Monitor for adequate PO intake   4. RD to follow    Goals:   1. Pt to consume >75% of ENN via PO intake and commercial beverage supplementation by next RD f/u    Nutrition Goal Status: new  Communication of RD Recs: other (comment) (discussed in POC)    Assessment and Plan    Nutrition Problem  Inadequate energy intake    Related to (etiology):   psychosis    Signs and Symptoms (as evidenced by):   Consuming 50% of meals   12/28: consuming 100% of meals  1/5: consuming % of meals (2x portions)    Interventions:  General Healthful Diet    Nutrition Diagnosis Status:   Resolved      Reason for Assessment    Reason For Assessment: RD follow-up  Diagnosis: psychological disorder  Relevant Medical History: No past medical history on file.    General Information Comments:   12/21: Pt admitted today. 50% intake of lunch. 9lb wt loss noted since august. NFPE not completed per psych status   12/28:  Pt is consuming 100% of meals now; Increase from last week.   1/5: pt consuming % of meals, w/ one meal 25% intake no recent labs; pt receiving commercial beverage Boost TID & 2x portions    Nutrition Discharge Planning: regular diet    Nutrition Risk Screen    Nutrition Risk Screen: no indicators present    Nutrition/Diet History    Spiritual, Cultural Beliefs, Judaism Practices, Values that Affect Care: no  Food Allergies: NKFA  Factors Affecting Nutritional Intake: None identified at this time    Anthropometrics    Temp: 98.3 °F (36.8 °C)  Height: 6' 4" (193 cm)  Height (inches): 76 in  Weight Method: Standard Scale  Weight: 90.8 kg (200 lb 1.1 oz)  Weight (lb): 200.07 lb  Ideal Body Weight (IBW), Male: 202 lb  % Ideal Body Weight, Male (lb): 94.02 %  BMI " (Calculated): 24.4  BMI Grade: 18.5-24.9 - normal       Lab/Procedures/Meds    Pertinent Labs Reviewed: reviewed  Pertinent Labs Comments: no recent labs  Pertinent Medications Reviewed: reviewed  Pertinent Medications Comments: folic acid, mvi, docusate sodium      Estimated/Assessed Needs    Weight Used For Calorie Calculations: 86.1 kg (189 lb 13.1 oz)  Energy Calorie Requirements (kcal): 1735-6054  Energy Need Method: Kcal/kg (25-30)  Protein Requirements: 69-86 g  Weight Used For Protein Calculations: 86.1 kg (189 lb 13.1 oz)     Estimated Fluid Requirement Method: RDA Method  RDA Method (mL): 2152         Nutrition Prescription Ordered    Current Diet Order: Regular diet  Nutrition Order Comments: double portions  Oral Nutrition Supplement: Boost (TID)    Evaluation of Received Nutrient/Fluid Intake    Fluid Required: meeting needs  % Intake of Estimated Energy Needs: 75 - 100 %  % Meal Intake: % (2x portions)    Nutrition Risk    Level of Risk/Frequency of Follow-up:  (1x/wk)       Monitor and Evaluation    Food and Nutrient Intake: food and beverage intake,energy intake  Food and Nutrient Adminstration: diet order  Anthropometric Measurements: weight,weight change,body mass index  Biochemical Data, Medical Tests and Procedures: electrolyte and renal panel,glucose/endocrine profile  Nutrition-Focused Physical Findings: overall appearance       Nutrition Follow-Up    RD Follow-up?: Yes (1x/wk)

## 2022-01-06 PROCEDURE — 25000003 PHARM REV CODE 250: Performed by: STUDENT IN AN ORGANIZED HEALTH CARE EDUCATION/TRAINING PROGRAM

## 2022-01-06 PROCEDURE — 11400000 HC PSYCH PRIVATE ROOM

## 2022-01-06 PROCEDURE — 90833 PR PSYCHOTHERAPY W/PATIENT W/E&M, 30 MIN (ADD ON): ICD-10-PCS | Mod: ,,, | Performed by: PSYCHIATRY & NEUROLOGY

## 2022-01-06 PROCEDURE — 99233 PR SUBSEQUENT HOSPITAL CARE,LEVL III: ICD-10-PCS | Mod: ,,, | Performed by: PSYCHIATRY & NEUROLOGY

## 2022-01-06 PROCEDURE — 99233 SBSQ HOSP IP/OBS HIGH 50: CPT | Mod: ,,, | Performed by: PSYCHIATRY & NEUROLOGY

## 2022-01-06 PROCEDURE — 90833 PSYTX W PT W E/M 30 MIN: CPT | Mod: ,,, | Performed by: PSYCHIATRY & NEUROLOGY

## 2022-01-06 PROCEDURE — 25000003 PHARM REV CODE 250: Performed by: PSYCHIATRY & NEUROLOGY

## 2022-01-06 RX ORDER — BENZTROPINE MESYLATE 0.5 MG/1
1 TABLET ORAL 2 TIMES DAILY
Status: DISCONTINUED | OUTPATIENT
Start: 2022-01-06 | End: 2022-01-09 | Stop reason: HOSPADM

## 2022-01-06 RX ORDER — CLONAZEPAM 1 MG/1
1 TABLET ORAL NIGHTLY
Status: DISCONTINUED | OUTPATIENT
Start: 2022-01-06 | End: 2022-01-09 | Stop reason: HOSPADM

## 2022-01-06 RX ADMIN — THERA TABS 1 TABLET: TAB at 08:01

## 2022-01-06 RX ADMIN — CLONAZEPAM 1 MG: 1 TABLET ORAL at 08:01

## 2022-01-06 RX ADMIN — RISPERIDONE 3 MG: 3 TABLET ORAL at 08:01

## 2022-01-06 RX ADMIN — DOCUSATE SODIUM 100 MG: 100 CAPSULE ORAL at 08:01

## 2022-01-06 RX ADMIN — BENZTROPINE MESYLATE 1 MG: 0.5 TABLET ORAL at 08:01

## 2022-01-06 RX ADMIN — FOLIC ACID 1 MG: 1 TABLET ORAL at 08:01

## 2022-01-06 RX ADMIN — ESCITALOPRAM OXALATE 15 MG: 5 TABLET, FILM COATED ORAL at 08:01

## 2022-01-06 RX ADMIN — BENZTROPINE MESYLATE 0.5 MG: 0.5 TABLET ORAL at 08:01

## 2022-01-06 NOTE — PLAN OF CARE
Problem: Adult Behavioral Health Plan of Care  Goal: Optimized Coping Skills in Response to Life Stressors  Outcome: Ongoing, Progressing        PLPC met with pt individually. Pt was sleeping and would not like to be disturbed at this time and would like for PLPC to return when he was awake.  Pt was very polite. Educated patient on benefit of participating in treatment and encouraged attendance. PLPC will attempt to have an individual session at a later time and date.

## 2022-01-06 NOTE — PLAN OF CARE
Pt is calm and cooperative.  Denies any S/I or H/I.  Speech is still slightly slurred but improved from yesterday.  Pt can stick tongue out more than he could yesterday.  Pt still somewhat anxious, slight paranoia.  Med compliant and following treatment plan thus far.  Pt denies any hallucinations at this time.  Thought process slightly delayed, pt states he feels a little sedated.  No acute distress apparent at this time, will continue to monitor.

## 2022-01-06 NOTE — PROGRESS NOTES
"PSYCHIATRY DAILY INPATIENT PROGRESS NOTE  SUBSEQUENT HOSPITAL VISIT      ENCOUNTER DATE: 1/6/2022  [facility]      DATE OF ADMISSION: 12/21/2021  9:50 AM  LENGTH OF STAY: 16 days        CHIEF COMPLAINT   Olvin Posey is a 21 y.o. male, seen during daily vasques rounds on the inpatient unit.  Olvin Posey presented with the chief complaint of depression/SI, "I was manipulating my family.. myself.. I'm glad they found out.""    The patient was seen and examined. The chart was reviewed.     Reviewed notes from Rns, CTRS and LPC and labs from the last 24 hours.    The patient's case was discussed with the treatment team/care providers today including Rns, CTRS and LPC    Staff reports no behavioral or management issues.     The patient has been compliant with treatment.      Subjective 01/06/2022    Yesterday, the patient reported:  he reports that he feels "slowed." he notes continued but less symptoms as documented below. He is not at baseline.   He reports vague/possible EPS (unable to specify)    Today, he reports that he is making steady improvements. He reports difficulty forming words/speaking possible EPS). He remains motivated to attend an IOP.   -Ah are lessening; depression is lessening    Mood/psychotic symptoms persist, remain fx/bx impairing and continue to require inpatient treatment/detox.       The patient denies any side effects to medications.        Psychiatric ROS (observed, reported, or endorsed/denied):  Depressed mood - decreasing slowly  Interest/pleasure/anhedonia: improving steadily  Guilt/hopelessness/worthlessness - improving steadily  Changes in Sleep - improving steadily  Changes in Appetite - improving steadily  Changes in Concentration - improving steadily  Changes in Energy - improving steadily  PMA/R- denies  Suicidal- active/passive ideations - denies  Homicidal ideations: active/passive ideations - denies    Hallucinations - decreasing slowly  Delusions - " improving steadily  Disorganized behavior - denies  Disorganized speech - denies  Negative symptoms - denies    Elevated mood - denies  Decreased need for sleep - denies  Grandiosity - denies  Racing thoughts - denies  Impulsivity - denies  Irritability- denies  Increased energy - denies  Distractibility - denies  Increase in goal-directed activity or PMA- denies    Symptoms of TEENA - decreasing slowly  Symptoms of Panic Disorder- denies  Symptoms of PTSD - denies        Psychotherapy:  · Target symptoms: mood disorder, psychosis  · Why chosen therapy is appropriate versus another modality: relevant to diagnosis, patient responds to this modality, evidence based practice  · Outcome monitoring methods: self-report, observation  · Therapeutic intervention type: insight oriented psychotherapy, behavior modifying psychotherapy, supportive psychotherapy, interactive psychotherapy  · Topics discussed/themes: difficulty managing affect in interpersonal relationships, building skills sets for symptom management, symptom recognition, substance abuse  · The patient's response to the intervention is accepting. The patient's progress toward treatment goals is fair.   · Duration of intervention: 16 minutes.        Overall progress: Patient is showing mild improvement             Medical ROS  General ROS: negative  Ophthalmic ROS: negative  ENT ROS: negative  Allergy and Immunology ROS: negative  Hematological and Lymphatic ROS: negative  Endocrine ROS: negative  Respiratory ROS: no cough, shortness of breath, or wheezing  Cardiovascular ROS: no chest pain or dyspnea on exertion  Gastrointestinal ROS: no abdominal pain, change in bowel habits, or black or bloody stools  Genito-Urinary ROS: no dysuria, trouble voiding, or hematuria  Musculoskeletal ROS: negative  Neurological ROS: no TIA or stroke symptoms  Dermatological ROS: negative        PAST MEDICAL HISTORY   No past medical history on file.        PSYCHOTROPIC MEDICATIONS  "  Scheduled Meds:   benztropine  0.5 mg Oral BID    clonazePAM  1 mg Oral BID    docusate sodium  100 mg Oral BID    EScitalopram oxalate  15 mg Oral Daily    folic acid  1 mg Oral Daily    multivitamin  1 tablet Oral Daily    risperiDONE  3 mg Oral BID     Continuous Infusions:  PRN Meds:.acetaminophen, aluminum-magnesium hydroxide-simethicone, hydrOXYzine pamoate, hydrOXYzine pamoate, loperamide, magnesium hydroxide 400 mg/5 ml, nicotine, OLANZapine **AND** OLANZapine      EXAMINATION    VITALS   Vitals:    01/04/22 2000 01/05/22 0729 01/05/22 2000 01/06/22 0745   BP: (!) 141/79 132/60 131/75 130/73   BP Location: Left arm Right arm Right arm Left arm   Patient Position: Sitting Lying Sitting Lying   Pulse: 89 89 89 87   Resp: 18 18 16 18   Temp: 98.3 °F (36.8 °C) 98.3 °F (36.8 °C) 98.3 °F (36.8 °C) 97.8 °F (36.6 °C)   TempSrc: Temporal Temporal Temporal Temporal   Weight:       Height:           Body mass index is 24.35 kg/m².        CONSTITUTIONAL  General Appearance: unremarkable, age appropriate, normal weight, well nourished     MUSCULOSKELETAL  Muscle Strength and Tone: no dyskinesia, no dystonia, no tremor, no tic  Abnormal Involuntary Movements: No  Gait and Station: non-ataxic     PSYCHIATRIC   Level of Consciousness: awake and alert   Orientation: person, place, time and situation  Grooming: Hospital garb and Well groomed  Psychomotor Behavior: normal, cooperative, eye contact normal, no PMA/R  Speech: normal tone, normal rate, normal pitch, normal volume  Language: grossly intact, able to name, able to repeat  Mood: "slowed"  Affect: Even, tired, guarded  Thought Process: linear and logical; less  guraded   Associations: intact, no juliette   Thought Content: DENIES homicidal ideation, suicidal ideation and delusions  Perceptions: increased AH; no VH  Memory: Able to recall past events, Remote intact and Recent intact  Attention: Decreased  Fund of Knowledge: Aware of current events and Vocabulary " appropriate   Estimate if Intelligence:  Above average based on work/education history, vocabulary and mental status exam  Insight: intact/fair - has awareness of illness;accepting tx  Judgment: improving- no behavior issues; compliant, calm, cooperative         DIAGNOSTIC TESTING   Laboratory Results  No results found for this or any previous visit (from the past 24 hour(s)).        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     EPS     Psychosocial stressors     Hyperglycemia  Hypokalemia          PROBLEM LIST AND MANAGEMENT PLANS       Depression with psychosis: pt counseled  -started trial of lexapro 5 mg po q day -increased to/continue 10 mg po q day; increase to 15 mg po q day- increase to 20 m gpo q day starting tomorrow  -started trial of abilify 2 mg po q day -switched to risperdal 1 mg po BID- increased to 1/2; increased to/conitnue at 2 mg po BID- increase to 2/3- increase to/conitnue at 3 mg po BID; will conitue to change/optimze as indicated     Anxiety: pt counseled  - lexapro as above  - started adjunctive klonopin 0.5 mg po BID- increased to/continue 1 mg po BID  -decreased to 0.5 mg PO BID  - decrease to 0.5 mg PO qhs -decreased to 0.25 mg PO qhs  -stopped; resume at 1 mg po BID- decrease to 1 mg po q HS    - started trial of trazodone 100 mg PO qhs for sleep adjunct  -decreased to 50 mg PO qhs- increase back to 100 mg po q HS- stopped (ineffective/possibly worsening symptoms)  - vistaril prn    EPS: pt counseled  -start cogentin 0.5 mg po BID- increase to 1 mg po BID    Insomnia: pt counseled  - vistaril 100 mg po q HS prn  - klonopin as above     Psychosocial stressors: pt counseled  - SW consulted and assisting with resources     Hyperglycemia: pt counseled  - check HgA1c-  WNL     Hypokalemia: pt counseled  - FM consulted   - monitor/recheck    Constipation  - started colace 100 mg PO  qd  -increase to/continue at 100 mg BID        PRESCRIPTION DRUG MANAGEMENT  Compliance: yes  Side Effects: no  Regimen Adjustments: see above     Discussed diagnosis, risks and benefits of proposed treatment vs alternative treatments vs no treatment, potential side effects of these treatments and the inherent unpredictability of treatment. The patient expresses understanding of the above and displays the capacity to agree with this treatment given said understanding. Patient also agrees that, currently, the benefits outweigh the risks and would like to pursue/continue treatment at this time.        DIAGNOSTIC TESTING  Labs reviewed with patient; follow up pending labs     Disposition:  -Will attempt to obtain outside psychiatric records if available  -SW to assist with aftercare planning and collateral  -Once stable discharge home with outpatient follow up care and/or rehab  -Continue inpatient treatment under a PEC and/or CEC for danger to self/ danger to others/grave disability as evident by significant psychotic thought disorder, danger to self, gravely disabled and suicidal ideation      DISCHARGE PLANNING  Expected Disposition Plan: Home or Self Care- IOP once stable      NEED FOR CONTINUED HOSPITALIZATION  Psychiatric illness continues to pose a potential threat to life or bodily function, of self or others, thereby requiring the need for continued inpatient psychiatric hospitalization: Yes, due to: significant psychotic thought disorder, danger to self and suicidal ideation, as evidenced by:  Ongoing concerns with active SI with plan to OD/Hang Self., Ongoing concerns with SI., Ongoing concerns with command hallucinations to hit/harm others. and Ongoing concerns with perceptual aberrancy and paranoid persecutory delusions leading to potential harm of self or others.    Protective inpatient pyschiatric hospitalization required while a safe disposition plan is enacted: Yes    Patient stabilized and ready for  discharge from inpatient psychiatric unit: No        STAFF:   Randy Matthews MD  Psychiatry

## 2022-01-06 NOTE — NURSING
Discussed discharge plan/process with al. Verified IOP appointment with [place] on Monday and mother will  at time of discharge.  Patient voiced understanding.

## 2022-01-06 NOTE — PROGRESS NOTES
"   01/06/22 1040   [facility] Group Therapy   Group Name Other  (expressions all about skilled worksheet)   Specific Interventions Skilled Activity Self-Expression   Participation Level Sharing;Appropriate   Participation Quality Cooperative   Insight/Motivation Improved   Affect/Mood Display Appropriate   Cognition Alert;Oriented   Psychomotor WNL   Patient reports "a little better, little more clarity, I can articulate myself a little better , speech still slow and sloppy, energy low." Patient states "I like the group activities, just not motivated to do stuff on my own."  "

## 2022-01-06 NOTE — PLAN OF CARE
"Pt states that she feels "alright" today, spending majority of time in room and activity room. +lag response. States that he feels as though one of his medications is too strong because he feels excessively tired all day and reports a decrease in appetite. Denies SI/HI at this time. Denies hallucinations at this time. No sleep disturbances reported. Medication compliant. Remains calm and cooperative. NADN. Safety precautions remain in place.  "

## 2022-01-06 NOTE — PLAN OF CARE
Lying quietly in bed, eyes closed, respirations even, unlabored. Apparently asleep. Slept 8 hours thus far with 2 interruptions. Safety precautions maintained. Rounds done every 15 minutes. Bed is fixed in low position and room is uncluttered and pathways are clear.

## 2022-01-07 LAB — SARS-COV-2 RDRP RESP QL NAA+PROBE: NEGATIVE

## 2022-01-07 PROCEDURE — 99233 SBSQ HOSP IP/OBS HIGH 50: CPT | Mod: ,,, | Performed by: STUDENT IN AN ORGANIZED HEALTH CARE EDUCATION/TRAINING PROGRAM

## 2022-01-07 PROCEDURE — 99233 PR SUBSEQUENT HOSPITAL CARE,LEVL III: ICD-10-PCS | Mod: ,,, | Performed by: STUDENT IN AN ORGANIZED HEALTH CARE EDUCATION/TRAINING PROGRAM

## 2022-01-07 PROCEDURE — 25000003 PHARM REV CODE 250: Performed by: STUDENT IN AN ORGANIZED HEALTH CARE EDUCATION/TRAINING PROGRAM

## 2022-01-07 PROCEDURE — 11400000 HC PSYCH PRIVATE ROOM

## 2022-01-07 PROCEDURE — U0002 COVID-19 LAB TEST NON-CDC: HCPCS | Performed by: PSYCHIATRY & NEUROLOGY

## 2022-01-07 PROCEDURE — 25000003 PHARM REV CODE 250: Performed by: PSYCHIATRY & NEUROLOGY

## 2022-01-07 RX ADMIN — FOLIC ACID 1 MG: 1 TABLET ORAL at 08:01

## 2022-01-07 RX ADMIN — DOCUSATE SODIUM 100 MG: 100 CAPSULE ORAL at 08:01

## 2022-01-07 RX ADMIN — ESCITALOPRAM OXALATE 15 MG: 5 TABLET, FILM COATED ORAL at 08:01

## 2022-01-07 RX ADMIN — RISPERIDONE 3 MG: 3 TABLET ORAL at 08:01

## 2022-01-07 RX ADMIN — CLONAZEPAM 1 MG: 1 TABLET ORAL at 08:01

## 2022-01-07 RX ADMIN — THERA TABS 1 TABLET: TAB at 08:01

## 2022-01-07 RX ADMIN — BENZTROPINE MESYLATE 1 MG: 0.5 TABLET ORAL at 08:01

## 2022-01-07 NOTE — PLAN OF CARE
Pt taking scheduled medications w/out difficulty;  Seizure /  Fall  prevention in place;   MVC Monitored per policy. Denies SI / HI; No C/O pain  No sleep disturbances as reported by PM shift.     Upon morning assessment patient was     awake       calm      quiet     accepting of care       Denies A/VH. No RIS observed.  explain:             Contracted for safety.   Hygiene: Did not shower shower  Avoids social contact, No Interaction with peers noted.  (Mood/Behavior/Emotion):  hypoactive labile sad  withdrawn calm cooperative   guarded thoughts linear  Ate meals/snack.     Did attend/participate in groups.  Continue POC.

## 2022-01-07 NOTE — NURSING
Calm / cooperative,guarded, withdrawn, & isolative. Thoughts linear, exhibits response lag & thought blocking exhibited.  Affect blunted, appears well kempt, unshaven..  Denies SI / HI & A/V hallucinations.  Monitor q 15 minutes peer protocol.

## 2022-01-07 NOTE — PLAN OF CARE
Lying quietly in bed, awake. Refusing Vistaril for anxiety. Safety precautions maintained. Rounds done every 15 minutes. Bed is fixed in low position and room is uncluttered and pathways are clear.

## 2022-01-07 NOTE — PROGRESS NOTES
"PSYCHIATRY DAILY INPATIENT PROGRESS NOTE  SUBSEQUENT HOSPITAL VISIT      ENCOUNTER DATE: 1/7/2022  [facility]      DATE OF ADMISSION: 12/21/2021  9:50 AM  LENGTH OF STAY: 17 days        CHIEF COMPLAINT   Olvin Posey is a 21 y.o. male, seen during daily vasques rounds on the inpatient unit.  Olvin Posey presented with the chief complaint of depression/SI, "I was manipulating my family.. myself.. I'm glad they found out.""    The patient was seen and examined. The chart was reviewed.     Reviewed notes from Rns, MD, CTRS and LPC and labs from the last 24 hours.    The patient's case was discussed with the treatment team/care providers today including Rns    Staff reports no behavioral or management issues.     The patient has been compliant with treatment.      Subjective 01/07/2022    Patient reports he is "sleeping better."  States his appetite is "less than usual."    Mood/psychotic symptoms persist, remain fx/bx impairing and continue to require inpatient treatment/detox.       The patient denies any side effects to medications.    PER RN: Pt is calm and cooperative.  Denies any S/I or H/I.  Speech is still slightly slurred but improved from yesterday.  Pt can stick tongue out more than he could yesterday.  Pt still somewhat anxious, slight paranoia.       Psychiatric ROS (observed, reported, or endorsed/denied):  Depressed mood - decreasing slowly  Interest/pleasure/anhedonia: improving steadily  Guilt/hopelessness/worthlessness - improving steadily  Changes in Sleep - improving steadily  Changes in Appetite - improving steadily  Changes in Concentration - improving steadily  Changes in Energy - improving steadily  PMA/R- denies  Suicidal- active/passive ideations - denies  Homicidal ideations: active/passive ideations - denies    Hallucinations - decreasing slowly  Delusions - improving steadily  Disorganized behavior - denies  Disorganized speech - denies  Negative symptoms - " denies    Elevated mood - denies  Decreased need for sleep - denies  Grandiosity - denies  Racing thoughts - denies  Impulsivity - denies  Irritability- denies  Increased energy - denies  Distractibility - denies  Increase in goal-directed activity or PMA- denies    Symptoms of TEENA - decreasing slowly  Symptoms of Panic Disorder- denies  Symptoms of PTSD - denies          Overall progress: Patient is showing mild improvement             Medical ROS  General ROS: negative  Ophthalmic ROS: negative  ENT ROS: negative  Allergy and Immunology ROS: negative  Hematological and Lymphatic ROS: negative  Endocrine ROS: negative  Respiratory ROS: no cough, shortness of breath, or wheezing  Cardiovascular ROS: no chest pain or dyspnea on exertion  Gastrointestinal ROS: no abdominal pain, change in bowel habits, or black or bloody stools  Genito-Urinary ROS: no dysuria, trouble voiding, or hematuria  Musculoskeletal ROS: negative  Neurological ROS: no TIA or stroke symptoms  Dermatological ROS: negative        PAST MEDICAL HISTORY   No past medical history on file.        PSYCHOTROPIC MEDICATIONS   Scheduled Meds:   benztropine  1 mg Oral BID    clonazePAM  1 mg Oral QHS    docusate sodium  100 mg Oral BID    EScitalopram oxalate  15 mg Oral Daily    folic acid  1 mg Oral Daily    multivitamin  1 tablet Oral Daily    risperiDONE  3 mg Oral BID     Continuous Infusions:  PRN Meds:.acetaminophen, aluminum-magnesium hydroxide-simethicone, hydrOXYzine pamoate, hydrOXYzine pamoate, loperamide, magnesium hydroxide 400 mg/5 ml, nicotine, OLANZapine **AND** OLANZapine      EXAMINATION    VITALS   Vitals:    01/05/22 2000 01/06/22 0745 01/06/22 2000 01/07/22 0730   BP: 131/75 130/73 (!) 147/79 (!) 112/59   BP Location: Right arm Left arm Right arm Left arm   Patient Position: Sitting Lying Sitting Sitting   Pulse: 89 87 89 85   Resp: 16 18 18 14   Temp: 98.3 °F (36.8 °C) 97.8 °F (36.6 °C) 97.4 °F (36.3 °C) 98 °F (36.7 °C)  "  TempSrc: Temporal Temporal Temporal Temporal   Weight:       Height:           Body mass index is 24.35 kg/m².        CONSTITUTIONAL  General Appearance: unremarkable, age appropriate, normal weight, well nourished     MUSCULOSKELETAL  Muscle Strength and Tone: no dyskinesia, no dystonia, no tremor, no tic  Abnormal Involuntary Movements: No  Gait and Station: non-ataxic     PSYCHIATRIC   Level of Consciousness: awake and alert   Orientation: person, place, time and situation  Grooming: Hospital garb and Well groomed  Psychomotor Behavior: normal, cooperative, eye contact normal, no PMA/R  Speech: normal tone, normal rate, normal pitch, normal volume  Language: grossly intact, able to name, able to repeat  Mood: "okay"  Affect: Even,   Thought Process: linear and logical; less  guraded   Associations: intact, no juliette   Thought Content: DENIES homicidal ideation, suicidal ideation and delusions  Perceptions: increased AH; no VH  Memory: Able to recall past events, Remote intact and Recent intact  Attention: Decreased  Fund of Knowledge: Aware of current events and Vocabulary appropriate   Estimate if Intelligence:  Above average based on work/education history, vocabulary and mental status exam  Insight: intact/fair - has awareness of illness;accepting tx  Judgment: improving- no behavior issues; compliant, calm, cooperative         DIAGNOSTIC TESTING   Laboratory Results  Recent Results (from the past 24 hour(s))   COVID-19 Rapid Screening    Collection Time: 01/07/22 10:03 AM   Result Value Ref Range    SARS-CoV-2 RNA, Amplification, Qual Negative Negative           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     EPS     Psychosocial stressors     Hyperglycemia  Hypokalemia          PROBLEM LIST AND MANAGEMENT PLANS       Depression with psychosis: pt counseled  -started trial of lexapro 5 mg po q day " -increased to/continue 10 mg po q day  -increase to 15 mg po q day  -started trial of abilify 2 mg po q day -switched to risperdal 1 mg po BID- increased to 1/2; increased to/conitnue at 2 mg po BID- increase to 2/3- increased to/conitnue at 3 mg po BID; will conitue to change/optimze as indicated     Anxiety: pt counseled  - lexapro as above  - started adjunctive klonopin 0.5 mg po BID- increased to/continue 1 mg po BID  -decreased to 0.5 mg PO BID  - decrease to 0.5 mg PO qhs -decreased to 0.25 mg PO qhs  -stopped; resume at 1 mg po BID- decreased to 1 mg po q HS    - started trial of trazodone 100 mg PO qhs for sleep adjunct  -decreased to 50 mg PO qhs- increase back to 100 mg po q HS- stopped (ineffective/possibly worsening symptoms)  - vistaril prn    EPS: pt counseled  -start cogentin 0.5 mg po BID  -increased to 1 mg po BID    Insomnia: pt counseled  - vistaril 100 mg po q HS prn  - klonopin as above     Psychosocial stressors: pt counseled  - SW consulted and assisting with resources     Hyperglycemia: pt counseled  - check HgA1c-  WNL     Hypokalemia: pt counseled  - FM consulted   - monitor/recheck    Constipation  - started colace 100 mg PO qd  -increase to/continue at 100 mg BID        PRESCRIPTION DRUG MANAGEMENT  Compliance: yes  Side Effects: no  Regimen Adjustments: see above     Discussed diagnosis, risks and benefits of proposed treatment vs alternative treatments vs no treatment, potential side effects of these treatments and the inherent unpredictability of treatment. The patient expresses understanding of the above and displays the capacity to agree with this treatment given said understanding. Patient also agrees that, currently, the benefits outweigh the risks and would like to pursue/continue treatment at this time.        DIAGNOSTIC TESTING  Labs reviewed with patient; follow up pending labs     Disposition:  -Will attempt to obtain outside psychiatric records if available  -SW to assist  with aftercare planning and collateral  -Once stable discharge home with outpatient follow up care and/or rehab  -Continue inpatient treatment under a PEC and/or CEC for danger to self/ danger to others/grave disability as evident by significant psychotic thought disorder, danger to self, gravely disabled and suicidal ideation      DISCHARGE PLANNING  Expected Disposition Plan: Home or Self Care- IOP once stable      NEED FOR CONTINUED HOSPITALIZATION  Psychiatric illness continues to pose a potential threat to life or bodily function, of self or others, thereby requiring the need for continued inpatient psychiatric hospitalization: Yes, due to: significant psychotic thought disorder, danger to self and suicidal ideation, as evidenced by:  Ongoing concerns with active SI with plan to OD/Hang Self., Ongoing concerns with SI., Ongoing concerns with command hallucinations to hit/harm others. and Ongoing concerns with perceptual aberrancy and paranoid persecutory delusions leading to potential harm of self or others.    Protective inpatient pyschiatric hospitalization required while a safe disposition plan is enacted: Yes    Patient stabilized and ready for discharge from inpatient psychiatric unit: No        STAFF:   Omar Linton III, MD  Psychiatry

## 2022-01-07 NOTE — PROGRESS NOTES
"   01/07/22 1040   [facility] Group Therapy   Group Name Therapeutic Recreation   Specific Interventions Cognitive Stimulation Training   Participation Level Appropriate   Participation Quality Cooperative   Insight/Motivation Good   Affect/Mood Display Appropriate   Cognition Alert;Oriented   Psychomotor WNL   Patient reports "little sleepy" mood, states "energy low but better, thought process is good, my speech is slurred but better." Patient remained involved, enjoys the activity.  "

## 2022-01-08 PROCEDURE — 99233 SBSQ HOSP IP/OBS HIGH 50: CPT | Mod: ,,, | Performed by: STUDENT IN AN ORGANIZED HEALTH CARE EDUCATION/TRAINING PROGRAM

## 2022-01-08 PROCEDURE — 11400000 HC PSYCH PRIVATE ROOM

## 2022-01-08 PROCEDURE — 99233 PR SUBSEQUENT HOSPITAL CARE,LEVL III: ICD-10-PCS | Mod: ,,, | Performed by: STUDENT IN AN ORGANIZED HEALTH CARE EDUCATION/TRAINING PROGRAM

## 2022-01-08 PROCEDURE — 25000003 PHARM REV CODE 250: Performed by: STUDENT IN AN ORGANIZED HEALTH CARE EDUCATION/TRAINING PROGRAM

## 2022-01-08 PROCEDURE — 25000003 PHARM REV CODE 250: Performed by: PSYCHIATRY & NEUROLOGY

## 2022-01-08 RX ORDER — ESCITALOPRAM OXALATE 20 MG/1
20 TABLET ORAL DAILY
Status: DISCONTINUED | OUTPATIENT
Start: 2022-01-09 | End: 2022-01-09 | Stop reason: HOSPADM

## 2022-01-08 RX ORDER — ESCITALOPRAM OXALATE 5 MG/1
5 TABLET ORAL ONCE
Status: COMPLETED | OUTPATIENT
Start: 2022-01-08 | End: 2022-01-08

## 2022-01-08 RX ADMIN — BENZTROPINE MESYLATE 1 MG: 0.5 TABLET ORAL at 08:01

## 2022-01-08 RX ADMIN — ESCITALOPRAM OXALATE 15 MG: 5 TABLET, FILM COATED ORAL at 08:01

## 2022-01-08 RX ADMIN — DOCUSATE SODIUM 100 MG: 100 CAPSULE ORAL at 08:01

## 2022-01-08 RX ADMIN — BENZTROPINE MESYLATE 1 MG: 0.5 TABLET ORAL at 09:01

## 2022-01-08 RX ADMIN — THERA TABS 1 TABLET: TAB at 08:01

## 2022-01-08 RX ADMIN — RISPERIDONE 3 MG: 3 TABLET ORAL at 08:01

## 2022-01-08 RX ADMIN — CLONAZEPAM 1 MG: 1 TABLET ORAL at 08:01

## 2022-01-08 RX ADMIN — FOLIC ACID 1 MG: 1 TABLET ORAL at 08:01

## 2022-01-08 RX ADMIN — ESCITALOPRAM OXALATE 5 MG: 5 TABLET, FILM COATED ORAL at 01:01

## 2022-01-08 NOTE — NURSING
Cooperative,somewhat anxious,guarded, withdrawn, & isolative. Thoughts linear, speech effective.   Affect blunted, appears well kempt, unshaven..  Denies SI / HI & A/V hallucinations.  Monitor q 15 minutes peer protocol

## 2022-01-08 NOTE — PROGRESS NOTES
"PSYCHIATRY DAILY INPATIENT PROGRESS NOTE  SUBSEQUENT HOSPITAL VISIT      ENCOUNTER DATE: 1/8/2022  [facility]      DATE OF ADMISSION: 12/21/2021  9:50 AM  LENGTH OF STAY: 18 days        CHIEF COMPLAINT   Olvin Posey is a 21 y.o. male, seen during daily vasques rounds on the inpatient unit.  Olvin Posey presented with the chief complaint of depression/SI, "I was manipulating my family.. myself.. I'm glad they found out.""    The patient was seen and examined. The chart was reviewed.     Reviewed notes from Rns and CTRS and labs from the last 24 hours.    The patient's case was discussed with the treatment team/care providers today including Rns    Staff reports no behavioral or management issues.     The patient has been compliant with treatment.      Subjective 01/08/2022    Patient reports "I feel like I drifted away from my family during 2020 covid... that came to a head during Christmas... I'm excited to see my mom tomorrow."    Agreeable to starting IOP on Monday.    Mood/psychotic symptoms persist, remain fx/bx impairing and continue to require inpatient treatment/detox.     The patient denies any side effects to medications.        PER RN: Cooperative,somewhat anxious,guarded, withdrawn, & isolative. Thoughts linear, exhibits response lag & thought blocking exhibited.           Psychiatric ROS (observed, reported, or endorsed/denied):  Depressed mood - decreasing slowly  Interest/pleasure/anhedonia: improving steadily  Guilt/hopelessness/worthlessness - improving steadily  Changes in Sleep - improving steadily  Changes in Appetite - improving steadily  Changes in Concentration - improving steadily  Changes in Energy - improving steadily  PMA/R- denies  Suicidal- active/passive ideations - denies  Homicidal ideations: active/passive ideations - denies    Hallucinations - decreasing slowly  Delusions - improving steadily  Disorganized behavior - denies  Disorganized speech - " denies  Negative symptoms - denies    Elevated mood - denies  Decreased need for sleep - denies  Grandiosity - denies  Racing thoughts - denies  Impulsivity - denies  Irritability- denies  Increased energy - denies  Distractibility - denies  Increase in goal-directed activity or PMA- denies    Symptoms of TEENA - decreasing slowly  Symptoms of Panic Disorder- denies  Symptoms of PTSD - denies          Overall progress: Patient is showing mild improvement             Medical ROS  General ROS: negative  Ophthalmic ROS: negative  ENT ROS: negative  Allergy and Immunology ROS: negative  Hematological and Lymphatic ROS: negative  Endocrine ROS: negative  Respiratory ROS: no cough, shortness of breath, or wheezing  Cardiovascular ROS: no chest pain or dyspnea on exertion  Gastrointestinal ROS: no abdominal pain, change in bowel habits, or black or bloody stools  Genito-Urinary ROS: no dysuria, trouble voiding, or hematuria  Musculoskeletal ROS: negative  Neurological ROS: no TIA or stroke symptoms  Dermatological ROS: negative        PAST MEDICAL HISTORY   No past medical history on file.        PSYCHOTROPIC MEDICATIONS   Scheduled Meds:   benztropine  1 mg Oral BID    clonazePAM  1 mg Oral QHS    docusate sodium  100 mg Oral BID    EScitalopram oxalate  15 mg Oral Daily    folic acid  1 mg Oral Daily    multivitamin  1 tablet Oral Daily    risperiDONE  3 mg Oral BID     Continuous Infusions:  PRN Meds:.acetaminophen, aluminum-magnesium hydroxide-simethicone, hydrOXYzine pamoate, hydrOXYzine pamoate, loperamide, magnesium hydroxide 400 mg/5 ml, nicotine, OLANZapine **AND** OLANZapine      EXAMINATION    VITALS   Vitals:    01/06/22 2000 01/07/22 0730 01/07/22 2000 01/08/22 0800   BP: (!) 147/79 (!) 112/59 138/86 (!) 103/55   BP Location: Right arm Left arm Right arm Right arm   Patient Position: Sitting Sitting Sitting Sitting   Pulse: 89 85 79 77   Resp: 18 14 16 18   Temp: 97.4 °F (36.3 °C) 98 °F (36.7 °C) 98 °F  "(36.7 °C) 97.8 °F (36.6 °C)   TempSrc: Temporal Temporal Temporal Temporal   Weight:       Height:           Body mass index is 24.35 kg/m².        CONSTITUTIONAL  General Appearance: unremarkable, age appropriate, normal weight, well nourished     MUSCULOSKELETAL  Muscle Strength and Tone: no dyskinesia, no dystonia, no tremor, no tic  Abnormal Involuntary Movements: No  Gait and Station: non-ataxic     PSYCHIATRIC   Level of Consciousness: awake and alert   Orientation: person, place, time and situation  Grooming: Hospital garb and Well groomed  Psychomotor Behavior: normal, cooperative, eye contact normal, no PMA/R  Speech: normal tone, normal rate, normal pitch, normal volume  Language: grossly intact, able to name, able to repeat  Mood: "better"  Affect: Even,   Thought Process: linear and logical; less  guraded   Associations: intact, no juliette   Thought Content: DENIES homicidal ideation, suicidal ideation and delusions  Perceptions: increased AH; no VH  Memory: Able to recall past events, Remote intact and Recent intact  Attention: Decreased  Fund of Knowledge: Aware of current events and Vocabulary appropriate   Estimate if Intelligence:  Above average based on work/education history, vocabulary and mental status exam  Insight: intact/fair - has awareness of illness;accepting tx  Judgment: improving- no behavior issues; compliant, calm, cooperative         DIAGNOSTIC TESTING   Laboratory Results  No results found for this or any previous visit (from the past 24 hour(s)).        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     EPS     Psychosocial stressors     Hyperglycemia  Hypokalemia          PROBLEM LIST AND MANAGEMENT PLANS         Depression with psychosis: pt counseled  -started trial of lexapro 5 mg po q day -increased to/continue 10 mg po q day  -increase to 15 mg po q day -increase to 20 mg " PO qd as documented by previous MD, agree with increase, pt also agreeable, +IC  -started trial of abilify 2 mg po q day -switched to risperdal 1 mg po BID- increased to 1/2; increased to/conitnue at 2 mg po BID- increase to 2/3  -increased to/conitnue at 3 mg po BID; will conitue to change/optimze as indicated       Anxiety: pt counseled  - lexapro as above  - started adjunctive klonopin 0.5 mg po BID- increased to/continue 1 mg po BID  -decreased to 0.5 mg PO BID  - decrease to 0.5 mg PO qhs -decreased to 0.25 mg PO qhs  -stopped; resume at 1 mg po BID- decreased to 1 mg po q HS  - started trial of trazodone 100 mg PO qhs for sleep adjunct  -decreased to 50 mg PO qhs- increase back to 100 mg po q HS - stopped (ineffective/possibly worsening symptoms)  - vistaril prn      EPS: pt counseled  -start cogentin 0.5 mg po BID  -increased to 1 mg po BID      Insomnia: pt counseled  - vistaril 100 mg po q HS prn  - klonopin as above       Psychosocial stressors: pt counseled  - SW consulted and assisting with resources       Hyperglycemia: pt counseled  - check HgA1c-  WNL       Hypokalemia: pt counseled  - FM consulted   - monitor/recheck      Constipation  - started colace 100 mg PO qd  -increase to/continue at 100 mg BID        PRESCRIPTION DRUG MANAGEMENT  Compliance: yes  Side Effects: no  Regimen Adjustments: see above         Discussed diagnosis, risks and benefits of proposed treatment vs alternative treatments vs no treatment, potential side effects of these treatments and the inherent unpredictability of treatment. The patient expresses understanding of the above and displays the capacity to agree with this treatment given said understanding. Patient also agrees that, currently, the benefits outweigh the risks and would like to pursue/continue treatment at this time.        DIAGNOSTIC TESTING  Labs reviewed with patient; follow up pending labs         Disposition:  -Will attempt to obtain outside psychiatric  records if available  -SW to assist with aftercare planning and collateral  -Once stable discharge home with outpatient follow up care and/or rehab  -Continue inpatient treatment under a PEC and/or [facility] for danger to self/ danger to others/grave disability as evident by significant psychotic thought disorder, danger to self, gravely disabled and suicidal ideation        DISCHARGE PLANNING  Expected Disposition Plan: Home or Self Care- IOP once stable      NEED FOR CONTINUED HOSPITALIZATION  Psychiatric illness continues to pose a potential threat to life or bodily function, of self or others, thereby requiring the need for continued inpatient psychiatric hospitalization: Yes, due to: significant psychotic thought disorder, danger to self and suicidal ideation, as evidenced by:  Ongoing concerns with active SI with plan to OD/Hang Self., Ongoing concerns with SI., Ongoing concerns with command hallucinations to hit/harm others. and Ongoing concerns with perceptual aberrancy and paranoid persecutory delusions leading to potential harm of self or others.    Protective inpatient pyschiatric hospitalization required while a safe disposition plan is enacted: Yes    Patient stabilized and ready for discharge from inpatient psychiatric unit: No        STAFF:   Omar Linton III, MD  Psychiatry

## 2022-01-08 NOTE — NURSING
Pt sleeping at this time, slept 7.5 hrs with no awakenings. NAD. Resp even and unlabored.Pathways clear,bed in low position. Q 15 min safety check ongoing.All precautions maintained.

## 2022-01-08 NOTE — PLAN OF CARE
Pt calm and cooperative, out on unit, denies SI at this time,  interacting better with staff and peers, pt less sedated and states having clearer thoughts and that he is able to read his books better, safety precautions maintained, will continue to monitor

## 2022-01-08 NOTE — PLAN OF CARE
Pt taking scheduled medications w/out difficulty;  Seizure /  Fall  prevention in place;  MVC Monitored per policy. Denies SI / HI; No C/O pain  No sleep disturbances as reported by PM shift.     Upon morning assessment patient was     awake      calm      quiet      accepting of care    Denies A/VH. No RIS observed            Contracted for safety.   Hygiene: Did not shower  Avoids social contact, Minimal Interaction with peers noted.  (Mood/Behavior/Emotion): blunted   hypoactive sad  withdrawn  cooperative anxious guarded  depressed  thoughts linear  Ate meals/snack.     Did attend/participate in groups.  Continue POC.

## 2022-01-09 VITALS
HEART RATE: 72 BPM | WEIGHT: 217.13 LBS | TEMPERATURE: 97 F | DIASTOLIC BLOOD PRESSURE: 72 MMHG | BODY MASS INDEX: 26.44 KG/M2 | RESPIRATION RATE: 18 BRPM | SYSTOLIC BLOOD PRESSURE: 122 MMHG | HEIGHT: 76 IN

## 2022-01-09 PROCEDURE — 99239 HOSP IP/OBS DSCHRG MGMT >30: CPT | Mod: ,,, | Performed by: STUDENT IN AN ORGANIZED HEALTH CARE EDUCATION/TRAINING PROGRAM

## 2022-01-09 PROCEDURE — 25000003 PHARM REV CODE 250: Performed by: STUDENT IN AN ORGANIZED HEALTH CARE EDUCATION/TRAINING PROGRAM

## 2022-01-09 PROCEDURE — 25000003 PHARM REV CODE 250: Performed by: PSYCHIATRY & NEUROLOGY

## 2022-01-09 PROCEDURE — 99239 PR HOSPITAL DISCHARGE DAY,>30 MIN: ICD-10-PCS | Mod: ,,, | Performed by: STUDENT IN AN ORGANIZED HEALTH CARE EDUCATION/TRAINING PROGRAM

## 2022-01-09 RX ORDER — DOCUSATE SODIUM 100 MG/1
100 CAPSULE, LIQUID FILLED ORAL 2 TIMES DAILY
Qty: 60 CAPSULE | Refills: 0 | Status: SHIPPED | OUTPATIENT
Start: 2022-01-09 | End: 2022-03-07

## 2022-01-09 RX ORDER — CLONAZEPAM 1 MG/1
1 TABLET ORAL NIGHTLY
Qty: 30 TABLET | Refills: 0 | Status: SHIPPED | OUTPATIENT
Start: 2022-01-09 | End: 2022-02-01

## 2022-01-09 RX ORDER — BENZTROPINE MESYLATE 1 MG/1
1 TABLET ORAL 2 TIMES DAILY
Qty: 60 TABLET | Refills: 0 | Status: SHIPPED | OUTPATIENT
Start: 2022-01-09 | End: 2022-02-01 | Stop reason: SDUPTHER

## 2022-01-09 RX ORDER — ESCITALOPRAM OXALATE 20 MG/1
20 TABLET ORAL DAILY
Qty: 30 TABLET | Refills: 0 | Status: SHIPPED | OUTPATIENT
Start: 2022-01-10 | End: 2022-02-01

## 2022-01-09 RX ORDER — RISPERIDONE 3 MG/1
3 TABLET ORAL 2 TIMES DAILY
Qty: 60 TABLET | Refills: 0 | Status: SHIPPED | OUTPATIENT
Start: 2022-01-09 | End: 2022-02-01

## 2022-01-09 RX ADMIN — ESCITALOPRAM OXALATE 20 MG: 20 TABLET ORAL at 08:01

## 2022-01-09 RX ADMIN — FOLIC ACID 1 MG: 1 TABLET ORAL at 08:01

## 2022-01-09 RX ADMIN — BENZTROPINE MESYLATE 1 MG: 0.5 TABLET ORAL at 08:01

## 2022-01-09 RX ADMIN — RISPERIDONE 3 MG: 3 TABLET ORAL at 08:01

## 2022-01-09 RX ADMIN — DOCUSATE SODIUM 100 MG: 100 CAPSULE ORAL at 08:01

## 2022-01-09 RX ADMIN — THERA TABS 1 TABLET: TAB at 08:01

## 2022-01-09 NOTE — PLAN OF CARE
Calm and cooperative,  states he had a good day.  He wanted to read  he book its okay to not be okay. Taking medications as ordered, vs stable, appetite good, promoted safety plan, effective coping skills, mood improvement, absence of SI/HI will continue to monitor safety.

## 2022-01-09 NOTE — NURSING
Patient calm / cooperative, withdrawn.  Appears blunted, appropriate dress.  Denies SI / HI & A/V hallucinations.  Monitor q 15 minutes

## 2022-01-09 NOTE — DISCHARGE SUMMARY
"Discharge Summary  Psychiatry    Admit Date: 12/21/2021    Discharge Date and Time:  01/09/2022 10:26 AM    Attending Physician: Randy Matthews MD     Discharge Provider: Omar Linton III    Reason for Admission:  HISTORY    CHIEF COMPLAINT   Olvin Posey is a 21 y.o. male with a past psychiatric history of "family issues" currently admitted to the inpatient unit with the following chief complaint: depression/SI, "I was manipulating my family.. myself.. I'm glad they found out.""    HPI   The patient was seen and examined. The chart was reviewed.     The patient presented to the ER on 12/21/2021 with complaints of depression/SI. Per staff notes:  -Pt's mother brought him to ED today d/t pt not sleeping in the past 48 hours and not acting himself. Pt reports auditory hallucinations and seeing bright flashing lights. Pt denies any SI/HI. Pt has a response lag to questions and has a flat affect but reports he has not slept d/t severe anxiety. Pt appears very calm and is cooperative with staff.   -Pt brought here by mother concerned bc pt has not been sleeping and has some depression and anxiety; pt appears somber and states he feels "not good" emotionally; pt states "yes" when asked if he has felt like hurting himself or having hallucinations  -No known family psych history other than depression, patient's mother brought him in today concerned that he has not slept in 48 hours he has had a auditory hallucinations as well as at times seeing flashing lights that other people are not seen.  Denies recent illness fevers chills nausea vomiting diarrhea dysuria headache.  Does endorse feeling anxious and having more energy than usual  - Upon my evaluation, patient is standing with mother in the room. Patient answers my questions with one word answers. He is unable to elaborate on answers. He reports experiencing AHs during interview.   PONew onset AHs   Mother ([phone])  Spoke to mother separately. She " "reports she first noticed a mood change around November. She hasn't noticed behavior this odd until a couple of days ago. Patient has been tearful and she feared for his safety. Patient is adopted, past family psychiatric history is unknown. Mother is not comfortable with patient returning home in current mental state     The patient was medically cleared and admitted to the U.     The patient was a very confused/distracted historian. He repeated several times that he has been selfish and manipulative. He presents depressed and psychotic.      He reports that he has felt this way "for years." He notes depression as below. He reports vaguely described AH and possible delusions.     He cites that he has struggled with the trauma of dealing with his mother's depression.     Procedures Performed: * No surgery found *      Hospital Course:      Patient was admitted to the inpatient psychiatry unit after being medically cleared in the ED. Chart and labs were reviewed. The patient was stabilized as follows:      Depression with psychosis: pt counseled  -started trial of lexapro 5 mg po q day -increased to/continue 10 mg po q day  -increase to 15 mg po q day -increase to 20 mg PO qd as documented by previous MD, agree with increase, pt also agreeable, +IC  -started trial of abilify 2 mg po q day -switched to risperdal 1 mg po BID- increased to 1/2; increased to/conitnue at 2 mg po BID- increase to 2/3  -increased to/conitnue at 3 mg po BID; will conitue to change/optimze as indicated        Anxiety: pt counseled  - lexapro as above  - started adjunctive klonopin 0.5 mg po BID- increased to/continue 1 mg po BID  -decreased to 0.5 mg PO BID  - decrease to 0.5 mg PO qhs -decreased to 0.25 mg PO qhs  -stopped; resume at 1 mg po BID- decreased to 1 mg po q HS  - started trial of trazodone 100 mg PO qhs for sleep adjunct  -decreased to 50 mg PO qhs- increase back to 100 mg po q HS - stopped (ineffective/possibly worsening " symptoms)  - vistaril prn        EPS: pt counseled  -start cogentin 0.5 mg po BID  -increased to 1 mg po BID        Insomnia: pt counseled  - vistaril 100 mg po q HS prn  - klonopin as above        Psychosocial stressors: pt counseled  - SW consulted and assisting with resources        Hyperglycemia: pt counseled  - check HgA1c-  WNL        Hypokalemia: pt counseled  - FM consulted   - monitor/recheck        Constipation  - started colace 100 mg PO qd  -increase to/continue at 100 mg BID           During hospitalization, the patient was encouraged to go to both groups and individual counseling. Patient was monitored for any side effects. A meeting was held with multidisciplinary team prior to discharge and pt's diagnosis, current medications, and follow up were discussed. The patient has been compliant with treatment and can adequately attend to activities of daily living in an independent manner. The patient denies any side effects. Patient was scheduled for discharge today 01/09/2022 by previous MD, Dr. Matthews, with plan to start scheduled IOP tomorrow on Monday 01/10/22, I agree with Dr. Matthews's plan and that patient is ready for discharge today. Patient requests to be discharged in concordance with aforementioned plan. The patient denies SI, HI, plan or intent for self harm or harm to others. The patient is no longer a danger to self or others nor gravely disabled disabled.  There are no objective signs of psychosis, depression, delirium or brittanie on MSE. Patient discharged  in stable condition with scheduled outpatient follow up.      Discussed diagnosis, risks and benefits of proposed treatment vs alternative treatments vs no treatment, and potential side effects of these treatments.  The patient expresses understanding of the above and displays the capacity to agree with this treatment given said understanding.  Patient also agrees that, currently, the benefits outweigh the risks and would like to  pursue treatment at this time.      Discharge MSE: stated age, casually dressed, well groomed.  No psychomotor agitation or retardation.  No abnormal involuntary movements.  Gait normal.  Speech normal, conversational.  Language fluent English. Mood fine.  Affect normal range, pleasant, euthymic.  Thought process linear.  Associations intact.  Denies suicidal or homicidal ideation.  Denies auditory hallucinations, paranoid ideation, ideas of reference.  Memory intact.  Attention intact.  Fund of knowledge intact.  Insight intact.  Judgment intact.  Alert and oriented to person, place, time.      Tobacco Usage:  Is patient a smoker? No  Does patient want prescription for Tobacco Cessation? No  Does patient want counseling for Tobacco Cessation? No    If patient would like to quit, then over the counter nicotine patch could be used. The patient could also follow up with his PCP or psychiatric provider for other alternatives.       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 illness      EPS     Psychosocial stressors     Hyperglycemia  Hypokalemia       Labs:  Admission on 12/21/2021   Component Date Value Ref Range Status    Hemoglobin A1C 12/22/2021 4.9  4.0 - 5.6 % Final    Estimated Avg Glucose 12/22/2021 94  68 - 131 mg/dL Final    SARS-CoV2 (COVID-19) Qualitative P* 12/24/2021 Not Detected  Not Detected Final    SARS-COV-2- Cycle Number 12/24/2021 N/A   Final    Sodium 12/29/2021 139  136 - 145 mmol/L Final    Potassium 12/29/2021 4.1  3.5 - 5.1 mmol/L Final    Chloride 12/29/2021 105  95 - 110 mmol/L Final    CO2 12/29/2021 28  23 - 29 mmol/L Final    Glucose 12/29/2021 90  70 - 110 mg/dL Final    BUN 12/29/2021 14  6 - 20 mg/dL Final    Creatinine 12/29/2021 0.9  0.5 - 1.4 mg/dL Final    Calcium 12/29/2021 8.7  8.7 - 10.5 mg/dL Final    Total Protein 12/29/2021 5.7* 6.0 - 8.4  g/dL Final    Albumin 12/29/2021 3.8  3.5 - 5.2 g/dL Final    Total Bilirubin 12/29/2021 0.7  0.1 - 1.0 mg/dL Final    Alkaline Phosphatase 12/29/2021 65  55 - 135 U/L Final    AST 12/29/2021 21  10 - 40 U/L Final    ALT 12/29/2021 33  10 - 44 U/L Final    Anion Gap 12/29/2021 6* 8 - 16 mmol/L Final    eGFR if African American 12/29/2021 >60  >60 mL/min/1.73 m^2 Final    eGFR if non African American 12/29/2021 >60  >60 mL/min/1.73 m^2 Final    SARS-CoV-2 RNA, Amplification, Qual 01/07/2022 Negative  Negative Final   Admission on 12/20/2021, Discharged on 12/21/2021   Component Date Value Ref Range Status    WBC 12/20/2021 8.67  3.90 - 12.70 K/uL Final    RBC 12/20/2021 5.61  4.60 - 6.20 M/uL Final    Hemoglobin 12/20/2021 16.5  14.0 - 18.0 g/dL Final    Hematocrit 12/20/2021 47.4  40.0 - 54.0 % Final    MCV 12/20/2021 85  82 - 98 fL Final    MCH 12/20/2021 29.4  27.0 - 31.0 pg Final    MCHC 12/20/2021 34.8  32.0 - 36.0 g/dL Final    RDW 12/20/2021 12.0  11.5 - 14.5 % Final    Platelets 12/20/2021 224  150 - 450 K/uL Final    MPV 12/20/2021 8.5* 9.2 - 12.9 fL Final    Immature Granulocytes 12/20/2021 0.5  0.0 - 0.5 % Final    Gran # (ANC) 12/20/2021 6.8  1.8 - 7.7 K/uL Final    Immature Grans (Abs) 12/20/2021 0.04  0.00 - 0.04 K/uL Final    Lymph # 12/20/2021 1.2  1.0 - 4.8 K/uL Final    Mono # 12/20/2021 0.6  0.3 - 1.0 K/uL Final    Eos # 12/20/2021 0.0  0.0 - 0.5 K/uL Final    Baso # 12/20/2021 0.02  0.00 - 0.20 K/uL Final    nRBC 12/20/2021 0  0 /100 WBC Final    Gran % 12/20/2021 78.5* 38.0 - 73.0 % Final    Lymph % 12/20/2021 14.1* 18.0 - 48.0 % Final    Mono % 12/20/2021 6.5  4.0 - 15.0 % Final    Eosinophil % 12/20/2021 0.2  0.0 - 8.0 % Final    Basophil % 12/20/2021 0.2  0.0 - 1.9 % Final    Differential Method 12/20/2021 Automated   Final    Sodium 12/20/2021 143  136 - 145 mmol/L Final    Potassium 12/20/2021 3.4* 3.5 - 5.1 mmol/L Final    Chloride 12/20/2021 107  95 -  110 mmol/L Final    CO2 12/20/2021 25  23 - 29 mmol/L Final    Glucose 12/20/2021 133* 70 - 110 mg/dL Final    BUN 12/20/2021 7  6 - 20 mg/dL Final    Creatinine 12/20/2021 1.0  0.5 - 1.4 mg/dL Final    Calcium 12/20/2021 9.3  8.7 - 10.5 mg/dL Final    Total Protein 12/20/2021 7.2  6.0 - 8.4 g/dL Final    Albumin 12/20/2021 4.8  3.5 - 5.2 g/dL Final    Total Bilirubin 12/20/2021 1.7* 0.1 - 1.0 mg/dL Final    Alkaline Phosphatase 12/20/2021 79  55 - 135 U/L Final    AST 12/20/2021 23  10 - 40 U/L Final    ALT 12/20/2021 31  10 - 44 U/L Final    Anion Gap 12/20/2021 11  8 - 16 mmol/L Final    eGFR if African American 12/20/2021 >60  >60 mL/min/1.73 m^2 Final    eGFR if non African American 12/20/2021 >60  >60 mL/min/1.73 m^2 Final    TSH 12/20/2021 1.017  0.400 - 4.000 uIU/mL Final    Specimen UA 12/20/2021 Urine, Clean Catch   Final    Color, UA 12/20/2021 Yellow  Yellow, Straw, Elke Final    Appearance, UA 12/20/2021 Clear  Clear Final    pH, UA 12/20/2021 6.0  5.0 - 8.0 Final    Specific Gravity, UA 12/20/2021 1.010  1.005 - 1.030 Final    Protein, UA 12/20/2021 Negative  Negative Final    Glucose, UA 12/20/2021 Negative  Negative Final    Ketones, UA 12/20/2021 1+* Negative Final    Bilirubin (UA) 12/20/2021 Negative  Negative Final    Occult Blood UA 12/20/2021 Negative  Negative Final    Nitrite, UA 12/20/2021 Negative  Negative Final    Urobilinogen, UA 12/20/2021 Negative  <2.0 EU/dL Final    Leukocytes, UA 12/20/2021 Negative  Negative Final    Benzodiazepines 12/20/2021 Negative  Negative Final    Methadone metabolites 12/20/2021 Negative  Negative Final    Cocaine (Metab.) 12/20/2021 Negative  Negative Final    Opiate Scrn, Ur 12/20/2021 Negative  Negative Final    Barbiturate Screen, Ur 12/20/2021 Negative  Negative Final    Amphetamine Screen, Ur 12/20/2021 Negative  Negative Final    THC 12/20/2021 Negative  Negative Final    Phencyclidine 12/20/2021 Negative   Negative Final    Creatinine, Urine 12/20/2021 120.2  23.0 - 375.0 mg/dL Final    Toxicology Information 12/20/2021 SEE COMMENT   Final    Alcohol, Serum 12/20/2021 <10  <10 mg/dL Final    Acetaminophen (Tylenol), Serum 12/20/2021 <3.0* 10.0 - 20.0 ug/mL Final    Salicylate Lvl 12/20/2021 <5.0* 15.0 - 30.0 mg/dL Final    POC Rapid COVID 12/20/2021 Negative  Negative Final     Acceptable 12/20/2021 Yes   Final         Discharged Condition: stable and improved; not currently a danger to self/others or gravely disabled    Disposition: Home or Self Care    Is patient being discharged on multiple neuroleptics? No    Follow Up/Patient Instructions:     · Take all medications as prescribed.  · Attend all psychiatric and medical follow up appointments.   · Abstain from all drugs and alcohol.  · Call the crisis line at: [phone] for help in a crisis and emergent situations or call 911 and Return to ED for any acute worsening of your condition including suicidal or homicidal ideations      Discharge Procedure Orders   Diet Adult Regular     Notify your health care provider if you experience any of the following:  temperature >100.4     Notify your health care provider if you experience any of the following:  persistent nausea and vomiting or diarrhea     Notify your health care provider if you experience any of the following:   Order Comments: Suicidal thoughts, homicidal thoughts, or any other changes in mental status  If you would like immediate help/crisis counseling, please call [phone] (TALK). Through this toll-free phone number for a network of crisis centers across the country. These centers staff their lines with people who are trained to listen and offer support to people in emotional crisis. If you are in an emergency, please call 911.     Notify your health care provider if you experience any of the following:  increased confusion or weakness     Notify your health care provider  if you experience any of the following:  persistent dizziness, light-headedness, or visual disturbances     Activity as tolerated      Follow-up Information     [facility] On 1/10/2022.    Why: as scheduled from 9:30am to 1:00pm on 1/10/2022 for , INTENSIVE OUTPATIENT PROGRAM (IOP)  Contact information:  [facility]  [address], [place] [address]  [phone]                         Medications:  Reconciled Home Medications:      Medication List      START taking these medications    benztropine 1 MG tablet  Commonly known as: COGENTIN  Take 1 tablet (1 mg total) by mouth 2 (two) times daily.     clonazePAM 1 MG tablet  Commonly known as: KlonoPIN  Take 1 tablet (1 mg total) by mouth every evening.     docusate sodium 100 MG capsule  Commonly known as: COLACE  Take 1 capsule (100 mg total) by mouth 2 (two) times daily.     EScitalopram oxalate 20 MG tablet  Commonly known as: LEXAPRO  Take 1 tablet (20 mg total) by mouth once daily.  Start taking on: January 10, 2022     risperiDONE 3 MG Tab  Commonly known as: RISPERDAL  Take 1 tablet (3 mg total) by mouth 2 (two) times daily.        STOP taking these medications    finasteride 1 mg tablet  Commonly known as: PROPECIA     nystatin-triamcinolone cream  Commonly known as: MYCOLOG II              Diet: regular     Activity as tolerated    Total time spent discharging patient: 33 minutes    Omar Linton III, MD  Psychiatry

## 2022-02-01 ENCOUNTER — OFFICE VISIT (OUTPATIENT)
Dept: PSYCHIATRY | Facility: CLINIC | Age: 22
End: 2022-02-01
Payer: COMMERCIAL

## 2022-02-01 VITALS
SYSTOLIC BLOOD PRESSURE: 117 MMHG | OXYGEN SATURATION: 98 % | DIASTOLIC BLOOD PRESSURE: 78 MMHG | HEART RATE: 80 BPM | WEIGHT: 206.13 LBS | BODY MASS INDEX: 25.1 KG/M2 | HEIGHT: 76 IN

## 2022-02-01 DIAGNOSIS — G25.9 EXTRAPYRAMIDAL DISORDER: ICD-10-CM

## 2022-02-01 DIAGNOSIS — F40.10 SOCIAL ANXIETY DISORDER: ICD-10-CM

## 2022-02-01 DIAGNOSIS — F25.1 SCHIZOAFFECTIVE DISORDER, DEPRESSIVE TYPE: Primary | ICD-10-CM

## 2022-02-01 PROCEDURE — 3074F SYST BP LT 130 MM HG: CPT | Mod: CPTII,S$GLB,, | Performed by: PSYCHIATRY & NEUROLOGY

## 2022-02-01 PROCEDURE — 1160F PR REVIEW ALL MEDS BY PRESCRIBER/CLIN PHARMACIST DOCUMENTED: ICD-10-PCS | Mod: CPTII,S$GLB,, | Performed by: PSYCHIATRY & NEUROLOGY

## 2022-02-01 PROCEDURE — 1159F MED LIST DOCD IN RCRD: CPT | Mod: CPTII,S$GLB,, | Performed by: PSYCHIATRY & NEUROLOGY

## 2022-02-01 PROCEDURE — 3078F PR MOST RECENT DIASTOLIC BLOOD PRESSURE < 80 MM HG: ICD-10-PCS | Mod: CPTII,S$GLB,, | Performed by: PSYCHIATRY & NEUROLOGY

## 2022-02-01 PROCEDURE — 1111F PR DISCHARGE MEDS RECONCILED W/ CURRENT OUTPATIENT MED LIST: ICD-10-PCS | Mod: CPTII,S$GLB,, | Performed by: PSYCHIATRY & NEUROLOGY

## 2022-02-01 PROCEDURE — 99999 PR PBB SHADOW E&M-EST. PATIENT-LVL III: CPT | Mod: PBBFAC,,, | Performed by: PSYCHIATRY & NEUROLOGY

## 2022-02-01 PROCEDURE — 99215 OFFICE O/P EST HI 40 MIN: CPT | Mod: S$GLB,,, | Performed by: PSYCHIATRY & NEUROLOGY

## 2022-02-01 PROCEDURE — 3074F PR MOST RECENT SYSTOLIC BLOOD PRESSURE < 130 MM HG: ICD-10-PCS | Mod: CPTII,S$GLB,, | Performed by: PSYCHIATRY & NEUROLOGY

## 2022-02-01 PROCEDURE — 3078F DIAST BP <80 MM HG: CPT | Mod: CPTII,S$GLB,, | Performed by: PSYCHIATRY & NEUROLOGY

## 2022-02-01 PROCEDURE — 99215 PR OFFICE/OUTPT VISIT, EST, LEVL V, 40-54 MIN: ICD-10-PCS | Mod: S$GLB,,, | Performed by: PSYCHIATRY & NEUROLOGY

## 2022-02-01 PROCEDURE — 1111F DSCHRG MED/CURRENT MED MERGE: CPT | Mod: CPTII,S$GLB,, | Performed by: PSYCHIATRY & NEUROLOGY

## 2022-02-01 PROCEDURE — 99999 PR PBB SHADOW E&M-EST. PATIENT-LVL III: ICD-10-PCS | Mod: PBBFAC,,, | Performed by: PSYCHIATRY & NEUROLOGY

## 2022-02-01 PROCEDURE — 3008F BODY MASS INDEX DOCD: CPT | Mod: CPTII,S$GLB,, | Performed by: PSYCHIATRY & NEUROLOGY

## 2022-02-01 PROCEDURE — 1159F PR MEDICATION LIST DOCUMENTED IN MEDICAL RECORD: ICD-10-PCS | Mod: CPTII,S$GLB,, | Performed by: PSYCHIATRY & NEUROLOGY

## 2022-02-01 PROCEDURE — 3008F PR BODY MASS INDEX (BMI) DOCUMENTED: ICD-10-PCS | Mod: CPTII,S$GLB,, | Performed by: PSYCHIATRY & NEUROLOGY

## 2022-02-01 PROCEDURE — 1160F RVW MEDS BY RX/DR IN RCRD: CPT | Mod: CPTII,S$GLB,, | Performed by: PSYCHIATRY & NEUROLOGY

## 2022-02-01 RX ORDER — BUSPIRONE HYDROCHLORIDE 5 MG/1
5 TABLET ORAL 3 TIMES DAILY
Qty: 90 TABLET | Refills: 1 | Status: SHIPPED | OUTPATIENT
Start: 2022-02-01 | End: 2022-03-07 | Stop reason: SDUPTHER

## 2022-02-01 RX ORDER — RISPERIDONE 2 MG/1
2 TABLET ORAL 2 TIMES DAILY
Qty: 60 TABLET | Refills: 1 | Status: SHIPPED | OUTPATIENT
Start: 2022-02-01 | End: 2022-03-01 | Stop reason: SDUPTHER

## 2022-02-01 RX ORDER — CLONAZEPAM 0.5 MG/1
0.5 TABLET ORAL NIGHTLY
Qty: 30 TABLET | Refills: 1 | Status: SHIPPED | OUTPATIENT
Start: 2022-02-01 | End: 2022-03-07

## 2022-02-01 RX ORDER — BENZTROPINE MESYLATE 1 MG/1
1 TABLET ORAL 2 TIMES DAILY
Qty: 60 TABLET | Refills: 1 | Status: SHIPPED | OUTPATIENT
Start: 2022-02-01 | End: 2022-03-07

## 2022-02-01 RX ORDER — ESCITALOPRAM OXALATE 20 MG/1
20 TABLET ORAL DAILY
Qty: 30 TABLET | Refills: 1 | Status: SHIPPED | OUTPATIENT
Start: 2022-02-01 | End: 2022-03-01 | Stop reason: SDUPTHER
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
## 2022-02-01 NOTE — PATIENT INSTRUCTIONS
[facility] - DEPARTMENT OF PSYCHIATRY   NEW PATIENT ORIENTATION INFORMATION  OUTPATIENT SERVICES COUNSELING CONTRACT    We appreciate the opportunity to participate in your medical care and hope the following protocols will make it easier for you to receive quality treatment in our department.    1. PUNCTUALITY: Your appointment is scheduled for a fixed amount of time reserved especially for you.  To get the benefit of your appointment, please arrive early enough to allow time for parking and registration.  If you are late for your appointment, your clinician is not able to offer additional time.  Please make every effort to be on time.  You may be asked to reschedule.    2. PAYMENT FOR SERVICES:   Payments are expected at the time of service.  Please contact [phone] if you need to resolve issues involving your account at [facility] or to set up a payment plan.    3. CANCELLATION / MISSED APPOINTMENTS:   In order to receive quality care, all appointments must be kept.  Appointment may be cancelled, ONLY by talking with an  at phone number [phone], between 8:00 a.m. and 5:00 p.m., Monday through Friday, at least 24 hours before your appointment time.  Your clinician reserves this time specifically for you, and if you will be unable to use it, it is necessary that you cancel in a timely manner.  If you do not give at least 24-hour notice of cancellation a fee may be assessed.  Please note that insurance does not cover no-show charges, so you will be billed directly.  If you are consistently late, cancel, or do not show for your appointments, our department reserves the right to terminate treatment.    4. CALLING THE DEPARTMENT:   MESSAGES, SCHEDULE OR CANCEL APPOINTMENTS- In general you can reach the department by calling [phone], between 8:00 a.m. and 5:00 p.m., Monday through Friday, to schedule or cancel appointments or leave a message for your clinician.  It is  advisable to schedule your visits far in advance to obtain the most convenient times for your appointments.   AFTER HOURS, WEEKEND OR HOLIDAYS- For urgent questions after hours, weekends and holidays, calling the department number [phone] will connect you to the [facility] On Call nursing staff or the Psychiatry Inpatient Unit.  The [facility] On Call nursing staff will speak with you and direct your call/care as necessary.   EMERGENCY-  In case of a crisis when there is a concern of harm to self or others, call 911 or the office [phone] between 8:00 a.m. and 5:00 p.m., Monday through Friday.  After hours, weekends or holidays, please call 911 or go to the Emergency Department where you can be thoroughly evaluated by a physician.    5. TEAM APPROACH:  Most patients receive therapy through our team system.  In the team system, your primary therapist will be a , psychologist, psychiatry resident or psychiatrist.  If your therapist is a , psychologist or psychiatry resident, please contact your primary therapist first in matters other than medications or acute medical problems.    6. PRESCRIPTION REFILLS:  Prescription refills must be done at your physician office visit.  You will be given a sufficient number of refills to last one extra month beyond your next appointment.  No additional refills will be approved beyond the original treatment plan.  After hours, sufficient medication may be approved to last until the next scheduled appointment. After hours requests for refills on controlled substances will be declined by the psychiatrist on call, as he or she may not be familiar with your case.  Please work closely with your doctor so that you have sufficient medication until your next appointment.  Again, please note that no additional prescriptions will be approved per patient request over the phone.   No additional refills will be approved beyond the original treatment plan.   In  "certain exceptional situations, a phone consult appointment may be arranged, with appropriate charge, for the review and approval of prescription refills to last until the next scheduled appointment.    7. FOLLOW UP APPOINTMENTS:  Follow-up appointments can be made in person at the [facility] office, or by calling [phone], from 8am to 5pm, between Monday and Friday.  It is advisable to schedule your office visits far enough in advance to obtain the most convenient times for your appointments.    Revised Feb. 23, 2010 - F/OA1/Newpatient                          You have been provided with a certain amount of medication with a specified number of refills.  Please follow up within an adequate time before you run out of medications.    REFILLS FOR CONTROLLED SUBSTANCES WILL NOT BE GIVEN WITHOUT AN APPOINTMENT.  I will not honor or fill automated refill requests from pharmacies.  You must come in for an appointment to get refills.        Please book your next appointment for myself or therapist by phone by calling our office at [phone].        Note that follow up appointments are 10-20 minutes long.  It is important that we focus on medication management.  Should you need therapy, please get set up with our therapist or call your insurance company to find out which therapists are available in your area.      PLEASE BE AT LEAST 15 MINUTES EARLY FOR YOUR NEXT APPOINTMENT.  Late arrivals WILL BE TURNED AWAY AND ASKED TO RESCHEDULE.  YOU MUST COME EARLY TO ALLOW TIME FOR CHECK-IN AS WELL AS GET YOUR VITAL SIGNS AND GO OVER YOUR MEDICATIONS.  Tardiness is not fair to the patients who present after you and are on time for their appointments.  It causes a delay in the appointments for patients and staff.  YOU MAY ALSO BE DISCHARGED FROM CLINIC with multiple late arrivals, late cancellations, or "No Show" appointments.   "     -----------------------------------------------------------------------------------------------------------------  IF YOU FEEL SUICIDAL OR HAVING THOUGHTS OR PLANS TO HURT YOURSELF OR OTHERS, CALL 911 OR REPORT TO THE NEAREST EMERGENCY ROOM.  YOU CAN ALSO ACCESS THE FOLLOWING HOTLINE:    National Suicide Prevention Hotline Number 8-554-246-TALK (5962)

## 2022-02-18 ENCOUNTER — OFFICE VISIT (OUTPATIENT)
Dept: PSYCHIATRY | Facility: CLINIC | Age: 22
End: 2022-02-18
Payer: COMMERCIAL

## 2022-02-18 DIAGNOSIS — F40.10 SOCIAL ANXIETY DISORDER: Primary | ICD-10-CM

## 2022-02-18 PROCEDURE — 99999 PR PBB SHADOW E&M-EST. PATIENT-LVL I: ICD-10-PCS | Mod: PBBFAC,,, | Performed by: SOCIAL WORKER

## 2022-02-18 PROCEDURE — 99999 PR PBB SHADOW E&M-EST. PATIENT-LVL I: CPT | Mod: PBBFAC,,, | Performed by: SOCIAL WORKER

## 2022-02-18 PROCEDURE — 1159F PR MEDICATION LIST DOCUMENTED IN MEDICAL RECORD: ICD-10-PCS | Mod: CPTII,S$GLB,, | Performed by: SOCIAL WORKER

## 2022-02-18 PROCEDURE — 1159F MED LIST DOCD IN RCRD: CPT | Mod: CPTII,S$GLB,, | Performed by: SOCIAL WORKER

## 2022-02-18 PROCEDURE — 90837 PR PSYCHOTHERAPY W/PATIENT, 60 MIN: ICD-10-PCS | Mod: S$GLB,,, | Performed by: SOCIAL WORKER

## 2022-02-18 PROCEDURE — 90837 PSYTX W PT 60 MINUTES: CPT | Mod: S$GLB,,, | Performed by: SOCIAL WORKER
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
## 2022-02-21 ENCOUNTER — PATIENT MESSAGE (OUTPATIENT)
Dept: PSYCHIATRY | Facility: CLINIC | Age: 22
End: 2022-02-21

## 2022-03-02 ENCOUNTER — OFFICE VISIT (OUTPATIENT)
Dept: PSYCHIATRY | Facility: CLINIC | Age: 22
End: 2022-03-02
Payer: COMMERCIAL

## 2022-03-02 DIAGNOSIS — F40.10 SOCIAL ANXIETY DISORDER: Primary | ICD-10-CM

## 2022-03-02 PROCEDURE — 99999 PR PBB SHADOW E&M-EST. PATIENT-LVL I: CPT | Mod: PBBFAC,,, | Performed by: SOCIAL WORKER

## 2022-03-02 PROCEDURE — 99999 PR PBB SHADOW E&M-EST. PATIENT-LVL I: ICD-10-PCS | Mod: PBBFAC,,, | Performed by: SOCIAL WORKER

## 2022-03-02 PROCEDURE — 90785 PR INTERACTIVE COMPLEXITY: ICD-10-PCS | Mod: S$GLB,,, | Performed by: SOCIAL WORKER

## 2022-03-02 PROCEDURE — 90834 PR PSYCHOTHERAPY W/PATIENT, 45 MIN: ICD-10-PCS | Mod: S$GLB,,, | Performed by: SOCIAL WORKER

## 2022-03-02 PROCEDURE — 1159F PR MEDICATION LIST DOCUMENTED IN MEDICAL RECORD: ICD-10-PCS | Mod: CPTII,S$GLB,, | Performed by: SOCIAL WORKER

## 2022-03-02 PROCEDURE — 1159F MED LIST DOCD IN RCRD: CPT | Mod: CPTII,S$GLB,, | Performed by: SOCIAL WORKER

## 2022-03-02 PROCEDURE — 90785 PSYTX COMPLEX INTERACTIVE: CPT | Mod: S$GLB,,, | Performed by: SOCIAL WORKER

## 2022-03-02 PROCEDURE — 90834 PSYTX W PT 45 MINUTES: CPT | Mod: S$GLB,,, | Performed by: SOCIAL WORKER
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
## 2022-03-07 ENCOUNTER — PATIENT MESSAGE (OUTPATIENT)
Dept: PSYCHIATRY | Facility: CLINIC | Age: 22
End: 2022-03-07
Payer: COMMERCIAL

## 2022-03-07 ENCOUNTER — OFFICE VISIT (OUTPATIENT)
Dept: PSYCHIATRY | Facility: CLINIC | Age: 22
End: 2022-03-07
Payer: COMMERCIAL

## 2022-03-07 VITALS
HEART RATE: 74 BPM | DIASTOLIC BLOOD PRESSURE: 73 MMHG | OXYGEN SATURATION: 97 % | HEIGHT: 76 IN | SYSTOLIC BLOOD PRESSURE: 113 MMHG | WEIGHT: 214.31 LBS | BODY MASS INDEX: 26.1 KG/M2

## 2022-03-07 DIAGNOSIS — G25.9 EXTRAPYRAMIDAL DISORDER: ICD-10-CM

## 2022-03-07 DIAGNOSIS — F25.1 SCHIZOAFFECTIVE DISORDER, DEPRESSIVE TYPE: Primary | ICD-10-CM

## 2022-03-07 DIAGNOSIS — F40.10 SOCIAL ANXIETY DISORDER: ICD-10-CM

## 2022-03-07 PROCEDURE — 1159F MED LIST DOCD IN RCRD: CPT | Mod: CPTII,S$GLB,, | Performed by: PSYCHIATRY & NEUROLOGY

## 2022-03-07 PROCEDURE — 3008F PR BODY MASS INDEX (BMI) DOCUMENTED: ICD-10-PCS | Mod: CPTII,S$GLB,, | Performed by: PSYCHIATRY & NEUROLOGY

## 2022-03-07 PROCEDURE — 1160F PR REVIEW ALL MEDS BY PRESCRIBER/CLIN PHARMACIST DOCUMENTED: ICD-10-PCS | Mod: CPTII,S$GLB,, | Performed by: PSYCHIATRY & NEUROLOGY

## 2022-03-07 PROCEDURE — 99214 PR OFFICE/OUTPT VISIT, EST, LEVL IV, 30-39 MIN: ICD-10-PCS | Mod: S$GLB,,, | Performed by: PSYCHIATRY & NEUROLOGY

## 2022-03-07 PROCEDURE — 99999 PR PBB SHADOW E&M-EST. PATIENT-LVL III: ICD-10-PCS | Mod: PBBFAC,,, | Performed by: PSYCHIATRY & NEUROLOGY

## 2022-03-07 PROCEDURE — 3078F PR MOST RECENT DIASTOLIC BLOOD PRESSURE < 80 MM HG: ICD-10-PCS | Mod: CPTII,S$GLB,, | Performed by: PSYCHIATRY & NEUROLOGY

## 2022-03-07 PROCEDURE — 99999 PR PBB SHADOW E&M-EST. PATIENT-LVL III: CPT | Mod: PBBFAC,,, | Performed by: PSYCHIATRY & NEUROLOGY

## 2022-03-07 PROCEDURE — 1159F PR MEDICATION LIST DOCUMENTED IN MEDICAL RECORD: ICD-10-PCS | Mod: CPTII,S$GLB,, | Performed by: PSYCHIATRY & NEUROLOGY

## 2022-03-07 PROCEDURE — 3074F PR MOST RECENT SYSTOLIC BLOOD PRESSURE < 130 MM HG: ICD-10-PCS | Mod: CPTII,S$GLB,, | Performed by: PSYCHIATRY & NEUROLOGY

## 2022-03-07 PROCEDURE — 3074F SYST BP LT 130 MM HG: CPT | Mod: CPTII,S$GLB,, | Performed by: PSYCHIATRY & NEUROLOGY

## 2022-03-07 PROCEDURE — 1160F RVW MEDS BY RX/DR IN RCRD: CPT | Mod: CPTII,S$GLB,, | Performed by: PSYCHIATRY & NEUROLOGY

## 2022-03-07 PROCEDURE — 99214 OFFICE O/P EST MOD 30 MIN: CPT | Mod: S$GLB,,, | Performed by: PSYCHIATRY & NEUROLOGY

## 2022-03-07 PROCEDURE — 3008F BODY MASS INDEX DOCD: CPT | Mod: CPTII,S$GLB,, | Performed by: PSYCHIATRY & NEUROLOGY

## 2022-03-07 PROCEDURE — 3078F DIAST BP <80 MM HG: CPT | Mod: CPTII,S$GLB,, | Performed by: PSYCHIATRY & NEUROLOGY

## 2022-03-07 RX ORDER — ESCITALOPRAM OXALATE 10 MG/1
10 TABLET ORAL DAILY
Qty: 30 TABLET | Refills: 1 | Status: SHIPPED | OUTPATIENT
Start: 2022-03-07 | End: 2022-04-13

## 2022-03-07 RX ORDER — BENZTROPINE MESYLATE 0.5 MG/1
0.5 TABLET ORAL 2 TIMES DAILY
Qty: 60 TABLET | Refills: 1 | Status: SHIPPED | OUTPATIENT
Start: 2022-03-07 | End: 2022-04-13

## 2022-03-07 RX ORDER — BUSPIRONE HYDROCHLORIDE 5 MG/1
5 TABLET ORAL 3 TIMES DAILY
Qty: 90 TABLET | Refills: 1 | Status: SHIPPED | OUTPATIENT
Start: 2022-03-07 | End: 2022-04-13 | Stop reason: SDUPTHER

## 2022-03-07 RX ORDER — RISPERIDONE 1 MG/1
1 TABLET ORAL 2 TIMES DAILY
Qty: 60 TABLET | Refills: 1 | Status: SHIPPED | OUTPATIENT
Start: 2022-03-07 | End: 2022-04-13

## 2022-03-07 NOTE — PATIENT INSTRUCTIONS
"        You have been provided with a certain amount of medication with a specified number of refills.  Please follow up within an adequate time before you run out of medications.    REFILLS FOR CONTROLLED SUBSTANCES WILL NOT BE GIVEN WITHOUT AN APPOINTMENT.  I will not honor or fill automated refill requests from pharmacies.  You must come in for an appointment to get refills.        Please book your next appointment for myself or therapist by phone by calling our office at [phone].        Note that follow up appointments are 10-20 minutes long.  It is important that we focus on medication management.  Should you need therapy, please get set up with our therapist or call your insurance company to find out which therapists are available in your area.      PLEASE BE AT LEAST 15 MINUTES EARLY FOR YOUR NEXT APPOINTMENT.  Late arrivals WILL BE TURNED AWAY AND ASKED TO RESCHEDULE.  YOU MUST COME EARLY TO ALLOW TIME FOR CHECK-IN AS WELL AS GET YOUR VITAL SIGNS AND GO OVER YOUR MEDICATIONS.  Tardiness is not fair to the patients who present after you and are on time for their appointments.  It causes a delay in the appointments for patients and staff.  YOU MAY ALSO BE DISCHARGED FROM CLINIC with multiple late arrivals, late cancellations, or "No Show" appointments.       -----------------------------------------------------------------------------------------------------------------  IF YOU FEEL SUICIDAL OR HAVING THOUGHTS OR PLANS TO HURT YOURSELF OR OTHERS, CALL 911 OR REPORT TO THE NEAREST EMERGENCY ROOM.  YOU CAN ALSO ACCESS THE FOLLOWING HOTLINE:    National Suicide Prevention Hotline Number 3-939-996-TALK (8882)                  "

## 2022-03-07 NOTE — PROGRESS NOTES
Outpatient Psychiatry Follow-Up Visit (MD/NP)    3/7/2022    Clinical Status of Patient:  Outpatient (Ambulatory)    Chief Complaint:  Olvin Posey is a 21 y.o. male who presents today for follow-up of mood disorder, anxiety and psychosis.  Met with patient.      Interval History and Content of Current Session:  Interim Events/Subjective Report/Content of Current Session: Patient Olvin Posey presents to clinic for follow up after his initial psychiatric evaluation as well as establishing care with therapy.  He says that he is feeling a little better.  Still hasn't experienced any more psychosis.  Is successfully decreasing his medications.  No depression or anxiety.  Sleeping well.  Says that he was successfully able to decrease most of his meds.  Has stopped the Colace and is having normal bowel movements.  Feels that he is still gaining weight and doesn't like it.  He is back exercising and trying to lose some of it.  Feeling pressured by father to get back to work but not sure if he is mentally capable at this point.  Doing better and asking to continue medication changes to help him do well.  Established with therapy and likes how he is getting help.    Psychotherapy:  · Target symptoms: anxiety , mood disorder, psychosis  · Why chosen therapy is appropriate versus another modality: relevant to diagnosis  · Outcome monitoring methods: self-report, observation  · Therapeutic intervention type: supportive psychotherapy  · Topics discussed/themes: building skills sets for symptom management, symptom recognition  · The patient's response to the intervention is accepting. The patient's progress toward treatment goals is fair.   · Duration of intervention: 15 minutes.    Review of Systems   · PSYCHIATRIC: Pertinant items are noted in the narrative.  · CONSTITUTIONAL: Positive for weight gain.   · MUSCULOSKELETAL: No pain or stiffness of the joints.  · NEUROLOGIC: No weakness, sensory changes, seizures,  "confusion, memory loss, tremor or other abnormal movements.  · RESPIRATORY: No shortness of breath.  · CARDIOVASCULAR: No tachycardia or chest pain.  · GASTROINTESTINAL: No nausea, vomiting, pain, constipation or diarrhea.    Past Medical, Family and Social History: The patient's past medical, family and social history have been reviewed and updated as appropriate within the electronic medical record - see encounter notes.    Compliance: yes    Side effects: None    Risk Parameters:  Patient reports no suicidal ideation  Patient reports no homicidal ideation  Patient reports no self-injurious behavior  Patient reports no violent behavior    Exam (detailed: at least 9 elements; comprehensive: all 15 elements)   Constitutional  Vitals:  Most recent vital signs, dated less than 90 days prior to this appointment, were reviewed.   Vitals:    03/07/22 1126   BP: 113/73   Pulse: 74   SpO2: 97%   Weight: 97.2 kg (214 lb 4.6 oz)   Height: 6' 4" (1.93 m)        General:  unremarkable, age appropriate     Musculoskeletal  Muscle Strength/Tone:  no tremor, no tic   Gait & Station:  non-ataxic     Psychiatric  Speech:  no latency; no press   Mood & Affect:  steady  blunted   Thought Process:  normal and logical   Associations:  intact   Thought Content:  normal, no suicidality, no homicidality, delusions, or paranoia   Insight:  has awareness of illness   Judgement: behavior is adequate to circumstances   Orientation:  person, place, situation, time/date   Memory: intact for content of interview   Language: able to name, able to repeat   Attention Span & Concentration:  able to focus   Fund of Knowledge:  intact and appropriate to age and level of education     Assessment and Diagnosis   Status/Progress: Based on the examination today, the patient's problem(s) is/are adequately but not ideally controlled.  New problems have been presented today.   Co-morbidities are complicating management of the primary condition.  There are no " active rule-out diagnoses for this patient at this time.     General Impression: We will continue pharmacological intervention and adjunctive therapy.       ICD-10-CM ICD-9-CM   1. Schizoaffective disorder, depressive type  F25.1 295.70   2. Social anxiety disorder  F40.10 300.23   3. Extrapyramidal disorder  G25.9 333.90       Intervention/Counseling/Treatment Plan   · Medication Management: Continue current medications. The risks and benefits of medication were discussed with the patient.  · Counseling provided with patient as follows: importance of compliance with chosen treatment options was emphasized, risks and benefits of treatment options, including medications, were discussed with the patient, risk factor reduction, prognosis, patient education, instructions for  management, treatment and follow-up were reviewed  1.  Continue benztropine to 0.5 mg BID targeting side effects of medications.  Likely contributing to constipation.  2.  stop clonazepam.  3.  decrease escitalopram to 10 mg daily for depression and anxiety.  Warned of risk of intolerability, nausea, sexual side effects, suicidality, brittanie, and serotonin syndrome.  4.  Decrease risperidone to 1 mg BID targeting psychosis.  Warned of risk of TD, EPS, metabolic syndrome.  Can decrease slowly to 0.5 mg BID.  5.  continue buspirone 5 mg BID (can take TID) targeting anxiety.  Warned of risk of brittanie, suicidality, serotonin syndrome.  6.  Likely plans to taper down and transition risperidone to a new med since he is having EPS.  Likely plans to change escitalopram to something else with less weight gain.  7.  Continue with therapy.  He is a smart pamela.  8.  Worry that this is presenting like more of a psychotic episode with prior underlying paranoia.  Not sure if this was MDD with a psychotic episode.  May have been due to sleep depravation.      Return to Clinic: 6 weeks, 1 month, as needed

## 2022-03-18 ENCOUNTER — OFFICE VISIT (OUTPATIENT)
Dept: PSYCHIATRY | Facility: CLINIC | Age: 22
End: 2022-03-18
Payer: COMMERCIAL

## 2022-03-18 DIAGNOSIS — F40.10 SOCIAL ANXIETY DISORDER: Primary | ICD-10-CM

## 2022-03-18 PROCEDURE — 99999 PR PBB SHADOW E&M-EST. PATIENT-LVL I: CPT | Mod: PBBFAC,,, | Performed by: SOCIAL WORKER

## 2022-03-18 PROCEDURE — 99999 PR PBB SHADOW E&M-EST. PATIENT-LVL I: ICD-10-PCS | Mod: PBBFAC,,, | Performed by: SOCIAL WORKER

## 2022-03-18 PROCEDURE — 90834 PSYTX W PT 45 MINUTES: CPT | Mod: S$GLB,,, | Performed by: SOCIAL WORKER

## 2022-03-18 PROCEDURE — 90834 PR PSYCHOTHERAPY W/PATIENT, 45 MIN: ICD-10-PCS | Mod: S$GLB,,, | Performed by: SOCIAL WORKER

## 2022-03-18 NOTE — PROGRESS NOTES
"Janelle Riggins, [facility]  Individual Psychotherapy   Olvin Posey,  3/18/2022    Site: [facility] - Dept of Psychiatry    Therapeutic Intervention: Met with patient for individual psychotherapy follow up.    Chief complaint/reason for encounter: anxiety     Content of current session:   Dr. JASSI Barksdale, Risperdone; left cogentin and Buspar the same; discontinued Klonopin.  Feeling ok now, but had a few days of increased anxiety with the med changes.   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  Has not done any writing..  Going to move more stuff out of apartment in [place] this and the next weekend.  Went for jog, shin splints flared up Formerly involved in philosophy club, bookclub, D&D online - that ended over a year ago. Has been communicating with [employer] friend Gravity (they/them, in [place], [place]) and they will watch a movie together this evening. Processed Thanksgiving experience and the anxiety that caused (feeling socially isolated without people he felt he could relate to, such as cousins of similar age and experience) - this exacerbated as Luz Marina approached.  Didn't want to attend, but didn't want to hurt/disappoint mom.  "I had a breakdown".  Validated pt's feelings and normalized situation in the context of the pandemic.    Reviewed Values - Olvin put a lot of thought into this exercise and states that he found it difficult. Specifically discussed "adventure" and the variety of toward moves that strengthen commitment to this value.  Encouraged Olvin to post values where they are visible and begin to think about "toward" moves.    FU in 2 weeks.  Ask about girlfriend        Treatment plan:  · Target symptoms: anxiety , adjustment  · Why chosen therapy is appropriate versus another modality: evidence based practice  · Outcome monitoring methods: self-report, observation  · Therapeutic intervention type: insight oriented " psychotherapy, behavior modifying psychotherapy    Risk parameters:  Patient reports no suicidal ideation  Patient reports no homicidal ideation  Patient reports no self-injurious behavior  Patient reports no violent behavior      Patient's response to intervention:  The patient's response to intervention is accepting.    Progress toward goals and other mental status changes:  The patient's progress toward goals is fair .    Diagnosis:     ICD-10-CM ICD-9-CM   1. Social anxiety disorder  F40.10 300.23       Plan: Pt plans to continue individual psychotherapy    Return to clinic: 2 weeks    Length of Service (minutes): 45

## 2022-04-04 ENCOUNTER — OFFICE VISIT (OUTPATIENT)
Dept: PSYCHIATRY | Facility: CLINIC | Age: 22
End: 2022-04-04
Payer: COMMERCIAL

## 2022-04-04 DIAGNOSIS — F40.10 SOCIAL ANXIETY DISORDER: Primary | ICD-10-CM

## 2022-04-04 PROCEDURE — 99999 PR PBB SHADOW E&M-EST. PATIENT-LVL I: ICD-10-PCS | Mod: PBBFAC,,, | Performed by: SOCIAL WORKER

## 2022-04-04 PROCEDURE — 99999 PR PBB SHADOW E&M-EST. PATIENT-LVL I: CPT | Mod: PBBFAC,,, | Performed by: SOCIAL WORKER

## 2022-04-04 PROCEDURE — 90837 PSYTX W PT 60 MINUTES: CPT | Mod: S$GLB,,, | Performed by: SOCIAL WORKER

## 2022-04-04 PROCEDURE — 90837 PR PSYCHOTHERAPY W/PATIENT, 60 MIN: ICD-10-PCS | Mod: S$GLB,,, | Performed by: SOCIAL WORKER

## 2022-04-04 NOTE — PROGRESS NOTES
"Janelle Riggins [facility]  Individual Psychotherapy   Olvin Posey,  4/4/2022    Site: [facility] - Dept of Psychiatry    Therapeutic Intervention: Met with patient for individual psychotherapy follow up.    Chief complaint/reason for encounter: anxiety     Content of current session: Met with pt for follow up session.  Completely moved out of R apartment.  Missing the sense of independence he had prior to depressive episode.  Pt is unable to define what that independence was related to.  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  Feels he has no motivation to do anything, is not doing things that he has previously enjoyed and is not hopeful that things will improve in the future.  Does not have feelings of worthlessness, guilt or shame.  No SI/HI.  Discussed the rule of inertia - an object at rest, and brainstormed places pt might go or things he might do in the neighborhood. "Do it anyway".  Did not like the thought of walking to coffee shop, but agreed to walk to grandmother's home for a brief visit.    Olvin will walk to his [facility]" (grandmother) house at 1 or 2 in the afternoon on Thursdays and visit with her for 30-60 minutes.    FU in 1 month        Treatment plan:  · Target symptoms: anxiety , adjustment  · Why chosen therapy is appropriate versus another modality: evidence based practice  · Outcome monitoring methods: self-report, observation  · Therapeutic intervention type: insight oriented psychotherapy, behavior modifying psychotherapy    Risk parameters:  Patient reports no suicidal ideation  Patient reports no homicidal ideation  Patient reports no self-injurious behavior  Patient reports no violent behavior      Patient's response to intervention:  The patient's response to intervention is accepting.    Progress toward goals and other mental status " changes:  The patient's progress toward goals is fair .    Diagnosis:     ICD-10-CM ICD-9-CM   1. Social anxiety disorder  F40.10 300.23       Plan: Pt plans to continue individual psychotherapy    Return to clinic:  1 month    Length of Service (minutes):  60

## 2022-04-13 ENCOUNTER — OFFICE VISIT (OUTPATIENT)
Dept: PSYCHIATRY | Facility: CLINIC | Age: 22
End: 2022-04-13
Payer: COMMERCIAL

## 2022-04-13 VITALS
WEIGHT: 223.69 LBS | HEIGHT: 76 IN | DIASTOLIC BLOOD PRESSURE: 77 MMHG | OXYGEN SATURATION: 79 % | HEART RATE: 69 BPM | BODY MASS INDEX: 27.24 KG/M2 | SYSTOLIC BLOOD PRESSURE: 128 MMHG

## 2022-04-13 DIAGNOSIS — F25.1 SCHIZOAFFECTIVE DISORDER, DEPRESSIVE TYPE: ICD-10-CM

## 2022-04-13 DIAGNOSIS — G25.9 EXTRAPYRAMIDAL DISORDER: ICD-10-CM

## 2022-04-13 DIAGNOSIS — F40.10 SOCIAL ANXIETY DISORDER: Primary | ICD-10-CM

## 2022-04-13 PROCEDURE — 3008F PR BODY MASS INDEX (BMI) DOCUMENTED: ICD-10-PCS | Mod: CPTII,S$GLB,, | Performed by: PSYCHIATRY & NEUROLOGY

## 2022-04-13 PROCEDURE — 99999 PR PBB SHADOW E&M-EST. PATIENT-LVL III: CPT | Mod: PBBFAC,,, | Performed by: PSYCHIATRY & NEUROLOGY

## 2022-04-13 PROCEDURE — 3074F SYST BP LT 130 MM HG: CPT | Mod: CPTII,S$GLB,, | Performed by: PSYCHIATRY & NEUROLOGY

## 2022-04-13 PROCEDURE — 3074F PR MOST RECENT SYSTOLIC BLOOD PRESSURE < 130 MM HG: ICD-10-PCS | Mod: CPTII,S$GLB,, | Performed by: PSYCHIATRY & NEUROLOGY

## 2022-04-13 PROCEDURE — 3008F BODY MASS INDEX DOCD: CPT | Mod: CPTII,S$GLB,, | Performed by: PSYCHIATRY & NEUROLOGY

## 2022-04-13 PROCEDURE — 3078F PR MOST RECENT DIASTOLIC BLOOD PRESSURE < 80 MM HG: ICD-10-PCS | Mod: CPTII,S$GLB,, | Performed by: PSYCHIATRY & NEUROLOGY

## 2022-04-13 PROCEDURE — 99214 PR OFFICE/OUTPT VISIT, EST, LEVL IV, 30-39 MIN: ICD-10-PCS | Mod: S$GLB,,, | Performed by: PSYCHIATRY & NEUROLOGY

## 2022-04-13 PROCEDURE — 3078F DIAST BP <80 MM HG: CPT | Mod: CPTII,S$GLB,, | Performed by: PSYCHIATRY & NEUROLOGY

## 2022-04-13 PROCEDURE — 1160F PR REVIEW ALL MEDS BY PRESCRIBER/CLIN PHARMACIST DOCUMENTED: ICD-10-PCS | Mod: CPTII,S$GLB,, | Performed by: PSYCHIATRY & NEUROLOGY

## 2022-04-13 PROCEDURE — 1159F PR MEDICATION LIST DOCUMENTED IN MEDICAL RECORD: ICD-10-PCS | Mod: CPTII,S$GLB,, | Performed by: PSYCHIATRY & NEUROLOGY

## 2022-04-13 PROCEDURE — 1160F RVW MEDS BY RX/DR IN RCRD: CPT | Mod: CPTII,S$GLB,, | Performed by: PSYCHIATRY & NEUROLOGY

## 2022-04-13 PROCEDURE — 99214 OFFICE O/P EST MOD 30 MIN: CPT | Mod: S$GLB,,, | Performed by: PSYCHIATRY & NEUROLOGY

## 2022-04-13 PROCEDURE — 1159F MED LIST DOCD IN RCRD: CPT | Mod: CPTII,S$GLB,, | Performed by: PSYCHIATRY & NEUROLOGY

## 2022-04-13 PROCEDURE — 99999 PR PBB SHADOW E&M-EST. PATIENT-LVL III: ICD-10-PCS | Mod: PBBFAC,,, | Performed by: PSYCHIATRY & NEUROLOGY

## 2022-04-13 RX ORDER — BUSPIRONE HYDROCHLORIDE 5 MG/1
5 TABLET ORAL 3 TIMES DAILY
Qty: 90 TABLET | Refills: 1 | Status: SHIPPED | OUTPATIENT
Start: 2022-04-13 | End: 2022-08-05

## 2022-04-13 NOTE — PROGRESS NOTES
Outpatient Psychiatry Follow-Up Visit (MD/NP)    4/13/2022    Clinical Status of Patient:  Outpatient (Ambulatory)    Chief Complaint:  Olvin Posey is a 21 y.o. male who presents today for follow-up of mood disorder, anxiety and psychosis.  Met with patient.      Interval History and Content of Current Session:  Interim Events/Subjective Report/Content of Current Session: Patient Olvin Posey presents to clinic for follow up.  He has established care with our clinic therapist.  He says that things are going good and therapy is going well.  No more bad or strange thoughts.  Not motivated still and not exercising.  Doesn't feel sad as he once did.  Feels that it is more of a lack of motivation and lack of interest.  Feels that his anxiety is better than in the past.  Still not engaged in things that he likes to do.  Not writing.  No social life.  Sleep and resting a good amount.  No shakes or tremors.  Still with social anxiety and stress of going out with family.  No paranoia or psychosis.  He did bring down his medications appropriately.  Asking to continue to minimize medications.    Psychotherapy:  · Target symptoms: anxiety , mood disorder, psychosis  · Why chosen therapy is appropriate versus another modality: relevant to diagnosis  · Outcome monitoring methods: self-report, observation  · Therapeutic intervention type: supportive psychotherapy  · Topics discussed/themes: building skills sets for symptom management, symptom recognition  · The patient's response to the intervention is accepting. The patient's progress toward treatment goals is fair.   · Duration of intervention: 15 minutes.    Review of Systems   · PSYCHIATRIC: Pertinant items are noted in the narrative.  · CONSTITUTIONAL: Positive for weight gain.   · MUSCULOSKELETAL: No pain or stiffness of the joints.  · NEUROLOGIC: No weakness, sensory changes, seizures, confusion, memory loss, tremor or other abnormal  "movements.  · RESPIRATORY: No shortness of breath.  · CARDIOVASCULAR: No tachycardia or chest pain.  · GASTROINTESTINAL: No nausea, vomiting, pain, constipation or diarrhea.    Past Medical, Family and Social History: The patient's past medical, family and social history have been reviewed and updated as appropriate within the electronic medical record - see encounter notes.    Compliance: yes    Side effects: None    Risk Parameters:  Patient reports no suicidal ideation  Patient reports no homicidal ideation  Patient reports no self-injurious behavior  Patient reports no violent behavior    Exam (detailed: at least 9 elements; comprehensive: all 15 elements)   Constitutional  Vitals:  Most recent vital signs, dated less than 90 days prior to this appointment, were reviewed.   Vitals:    04/13/22 0933   BP: 128/77   Pulse: 69   SpO2: (!) 79%   Weight: 101.5 kg (223 lb 10.5 oz)   Height: 6' 4" (1.93 m)        General:  unremarkable, age appropriate     Musculoskeletal  Muscle Strength/Tone:  no tremor, no tic   Gait & Station:  non-ataxic     Psychiatric  Speech:  no latency; no press   Mood & Affect:  steady  blunted   Thought Process:  normal and logical   Associations:  intact   Thought Content:  normal, no suicidality, no homicidality, delusions, or paranoia   Insight:  has awareness of illness   Judgement: behavior is adequate to circumstances   Orientation:  person, place, situation, time/date   Memory: intact for content of interview   Language: able to name, able to repeat   Attention Span & Concentration:  able to focus   Fund of Knowledge:  intact and appropriate to age and level of education     Assessment and Diagnosis   Status/Progress: Based on the examination today, the patient's problem(s) is/are adequately but not ideally controlled.  New problems have been presented today.   Co-morbidities are complicating management of the primary condition.  There are no active rule-out diagnoses for this patient " at this time.     General Impression: We will continue pharmacological intervention and adjunctive therapy.       ICD-10-CM ICD-9-CM   1. Social anxiety disorder  F40.10 300.23   2. Schizoaffective disorder, depressive type  F25.1 295.70   3. Extrapyramidal disorder  G25.9 333.90       Intervention/Counseling/Treatment Plan   · Medication Management: Continue current medications. The risks and benefits of medication were discussed with the patient.  · Counseling provided with patient as follows: importance of compliance with chosen treatment options was emphasized, risks and benefits of treatment options, including medications, were discussed with the patient, risk factor reduction, prognosis, patient education, instructions for  management, treatment and follow-up were reviewed  1.  stop benztropine.  2.  Stop escitalopram.  3.  Stop risperidone.  4.  continue buspirone 5 mg BID (can take TID) targeting anxiety.  Warned of risk of brittanie, suicidality, serotonin syndrome.  5.  Continue with therapy.  He is a smart pamela.  6.  Worry that this is presenting like more of a psychotic episode with prior underlying paranoia.  Not sure if this was MDD with a psychotic episode.  May have been due to sleep depravation.   7.  May consider Wellbutrin in the future for motivation.     Return to Clinic: 6 weeks, as needed

## 2022-04-13 NOTE — PATIENT INSTRUCTIONS
"        You have been provided with a certain amount of medication with a specified number of refills.  Please follow up within an adequate time before you run out of medications.    REFILLS FOR CONTROLLED SUBSTANCES WILL NOT BE GIVEN WITHOUT AN APPOINTMENT.  I will not honor or fill automated refill requests from pharmacies.  You must come in for an appointment to get refills.        Please book your next appointment for myself or therapist by phone by calling our office at [phone].        Note that follow up appointments are 10-20 minutes long.  It is important that we focus on medication management.  Should you need therapy, please get set up with our therapist or call your insurance company to find out which therapists are available in your area.      PLEASE BE AT LEAST 15 MINUTES EARLY FOR YOUR NEXT APPOINTMENT.  Late arrivals WILL BE TURNED AWAY AND ASKED TO RESCHEDULE.  YOU MUST COME EARLY TO ALLOW TIME FOR CHECK-IN AS WELL AS GET YOUR VITAL SIGNS AND GO OVER YOUR MEDICATIONS.  Tardiness is not fair to the patients who present after you and are on time for their appointments.  It causes a delay in the appointments for patients and staff.  YOU MAY ALSO BE DISCHARGED FROM CLINIC with multiple late arrivals, late cancellations, or "No Show" appointments.       -----------------------------------------------------------------------------------------------------------------  IF YOU FEEL SUICIDAL OR HAVING THOUGHTS OR PLANS TO HURT YOURSELF OR OTHERS, CALL 911 OR REPORT TO THE NEAREST EMERGENCY ROOM.  YOU CAN ALSO ACCESS THE FOLLOWING HOTLINE:    National Suicide Prevention Hotline Number 8-858-858-OBEX (0441)          1)  Stop the benztropine  2)  Take a half pill of escitalopram daily for 7 days, then stop  3)  Take the half pill of risperidone only at night for 7 days, then stop  4)  Continue the buspirone twice daily for anxiety               "

## 2022-04-29 ENCOUNTER — OFFICE VISIT (OUTPATIENT)
Dept: PSYCHIATRY | Facility: CLINIC | Age: 22
End: 2022-04-29
Payer: COMMERCIAL

## 2022-04-29 DIAGNOSIS — F40.10 SOCIAL ANXIETY DISORDER: Primary | ICD-10-CM

## 2022-04-29 PROCEDURE — 90837 PSYTX W PT 60 MINUTES: CPT | Mod: S$GLB,,, | Performed by: SOCIAL WORKER

## 2022-04-29 PROCEDURE — 99999 PR PBB SHADOW E&M-EST. PATIENT-LVL I: CPT | Mod: PBBFAC,,, | Performed by: SOCIAL WORKER

## 2022-04-29 PROCEDURE — 90837 PR PSYCHOTHERAPY W/PATIENT, 60 MIN: ICD-10-PCS | Mod: S$GLB,,, | Performed by: SOCIAL WORKER

## 2022-04-29 PROCEDURE — 99999 PR PBB SHADOW E&M-EST. PATIENT-LVL I: ICD-10-PCS | Mod: PBBFAC,,, | Performed by: SOCIAL WORKER

## 2022-04-29 NOTE — PROGRESS NOTES
"  Janelle Riggins, [facility]  Individual Psychotherapy   Marialuisa Posey,  4/29/2022    Site: [facility] - Dept of Psychiatry    Therapeutic Intervention: Met with patient for individual psychotherapy follow up.    Chief complaint/reason for encounter: anxiety     Content of current session: Met with pt for follow up session.  Marialuisa followed through on his commitment to walk to his gmo's house and visit - once.  States he is back to working out 3-5 days per week.  Doing little else.  Began to talk about goals - as a kid his goal was to play in the woods with his friends (in [place]).  Talked about gf Alla ("I don't want to talk about Alla") - mom was against that relationship from the start and contacted Alla's mom to help break them up - she felt that "Alla wasn't good for me".  In an attempt to increase Marialuisa' level of interest and activity we began to explore things he might do.  Ultimately marialuisa states that he has been thinking he would like to get his 's license and ultimately take a road trip.  Today he committed to asking gmo to take him to a place where he can practice driving her care 2x/week for a total of 6-8 times before our next session.    FU in 1 month               From Previous Session:  Completely moved out of [facility] apartment.  Missing the sense of independence he had prior to depressive episode.  Pt is unable to define what that independence was related to.  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  Feels he has no motivation to do anything, is not doing things that he has previously enjoyed and is not hopeful that things will improve in the future.  Does not have feelings of worthlessness, guilt or shame.  No SI/HI.  Discussed the rule of inertia - an object at rest, and brainstormed places pt might go or things he might do in the neighborhood. "Do it " "anyway".  Did not like the thought of walking to coffee shop, but agreed to walk to grandmother's home for a brief visit.    Olvin will walk to his "ELISABET-Daniel's" (grandmother) house at 1 or 2 in the afternoon on Thursdays and visit with her for 30-60 minutes.    FU in 1 month        Treatment plan:  · Target symptoms: anxiety , adjustment  · Why chosen therapy is appropriate versus another modality: evidence based practice  · Outcome monitoring methods: self-report, observation  · Therapeutic intervention type: insight oriented psychotherapy, behavior modifying psychotherapy    Risk parameters:  Patient reports no suicidal ideation  Patient reports no homicidal ideation  Patient reports no self-injurious behavior  Patient reports no violent behavior      Patient's response to intervention:  The patient's response to intervention is accepting.    Progress toward goals and other mental status changes:  The patient's progress toward goals is fair .    Diagnosis:     ICD-10-CM ICD-9-CM   1. Social anxiety disorder  F40.10 300.23       Plan: Pt plans to continue individual psychotherapy    Return to clinic:  1 month    Length of Service (minutes):  60      "

## 2022-06-20 ENCOUNTER — TELEPHONE (OUTPATIENT)
Dept: PSYCHIATRY | Facility: CLINIC | Age: 22
End: 2022-06-20
Payer: COMMERCIAL

## 2022-07-06 ENCOUNTER — TELEPHONE (OUTPATIENT)
Dept: PSYCHIATRY | Facility: CLINIC | Age: 22
End: 2022-07-06
Payer: COMMERCIAL

## 2022-07-06 NOTE — TELEPHONE ENCOUNTER
Olvin is no longer on medication and she is concerned. All he does is sit in his room, day and night. Has not left house in a month or has any desire to come out of his room and is eating a lot. Mom is asking for advice she feels he is depressed or stuck or avoiding his life. No threat to self or anyone at this point. She asking to be advised.

## 2022-07-06 NOTE — TELEPHONE ENCOUNTER
I returned the call and she set an appointment for him to come in. She will discuss with Olvin the options. She is aware that if he becomes worse to call emergency. Visit is not till August she will try to convince him to go to be evaluated in e.d.

## 2022-07-13 ENCOUNTER — TELEPHONE (OUTPATIENT)
Dept: PSYCHIATRY | Facility: CLINIC | Age: 22
End: 2022-07-13
Payer: COMMERCIAL

## 2022-07-13 NOTE — TELEPHONE ENCOUNTER
"Spoke with father.  States was doing well in late April but has lost contact with him.  Father is in contact with patient's mother who states that he is isolating and doesn't leave his room.  When she speaks to him about mental health, he walks away.  He says that I need to speak with patient's mother to impress upon her that he needs to go to the ED and/or come in for an outpatient appointment.      Spoke with mother (Erica) about his condition.  She had a conversation with him today and he was somewhat engaging.  He does not feel that he is depressed but she feels that he is depressed.  She feels that he is not suicidal or "gravely disabled".  She says that he is "friendly and charming".  She doesn't feel that it is to the point of needing a hospitalization.      Told mother that she needs to call the 's office and have him brought to the ED for an evaluation.  Also told her to get him to resume Lexapro.  She says that she is going to talk to him.  Expressed my concern for psychosis and/or suicide.    "

## 2022-07-13 NOTE — TELEPHONE ENCOUNTER
Father called and said he really needs you to call him or mother about Olvin. Dad is in [place] and says that he is in a deep depression. He refuses to go willing to emergency and says he is not depressed and does not need to go. Mom said she is afraid she could be overreacting but wants to be sure. Last night he had dinner with her and talked so at this point she is hoping to hear from you to see if this is and emergency situation or over reacting.

## 2022-08-05 ENCOUNTER — OFFICE VISIT (OUTPATIENT)
Dept: PSYCHIATRY | Facility: CLINIC | Age: 22
End: 2022-08-05
Payer: COMMERCIAL

## 2022-08-05 VITALS
DIASTOLIC BLOOD PRESSURE: 82 MMHG | HEIGHT: 76 IN | SYSTOLIC BLOOD PRESSURE: 135 MMHG | WEIGHT: 230.19 LBS | BODY MASS INDEX: 28.03 KG/M2 | HEART RATE: 76 BPM

## 2022-08-05 DIAGNOSIS — F33.41 MAJOR DEPRESSIVE DISORDER, RECURRENT EPISODE, IN PARTIAL REMISSION: Primary | ICD-10-CM

## 2022-08-05 DIAGNOSIS — F40.10 SOCIAL ANXIETY DISORDER: ICD-10-CM

## 2022-08-05 PROCEDURE — 3008F BODY MASS INDEX DOCD: CPT | Mod: CPTII,S$GLB,, | Performed by: PSYCHIATRY & NEUROLOGY

## 2022-08-05 PROCEDURE — 1160F PR REVIEW ALL MEDS BY PRESCRIBER/CLIN PHARMACIST DOCUMENTED: ICD-10-PCS | Mod: CPTII,S$GLB,, | Performed by: PSYCHIATRY & NEUROLOGY

## 2022-08-05 PROCEDURE — 3008F PR BODY MASS INDEX (BMI) DOCUMENTED: ICD-10-PCS | Mod: CPTII,S$GLB,, | Performed by: PSYCHIATRY & NEUROLOGY

## 2022-08-05 PROCEDURE — 1159F MED LIST DOCD IN RCRD: CPT | Mod: CPTII,S$GLB,, | Performed by: PSYCHIATRY & NEUROLOGY

## 2022-08-05 PROCEDURE — 1160F RVW MEDS BY RX/DR IN RCRD: CPT | Mod: CPTII,S$GLB,, | Performed by: PSYCHIATRY & NEUROLOGY

## 2022-08-05 PROCEDURE — 99213 OFFICE O/P EST LOW 20 MIN: CPT | Mod: S$GLB,,, | Performed by: PSYCHIATRY & NEUROLOGY

## 2022-08-05 PROCEDURE — 99999 PR PBB SHADOW E&M-EST. PATIENT-LVL III: ICD-10-PCS | Mod: PBBFAC,,, | Performed by: PSYCHIATRY & NEUROLOGY

## 2022-08-05 PROCEDURE — 3075F SYST BP GE 130 - 139MM HG: CPT | Mod: CPTII,S$GLB,, | Performed by: PSYCHIATRY & NEUROLOGY

## 2022-08-05 PROCEDURE — 3075F PR MOST RECENT SYSTOLIC BLOOD PRESS GE 130-139MM HG: ICD-10-PCS | Mod: CPTII,S$GLB,, | Performed by: PSYCHIATRY & NEUROLOGY

## 2022-08-05 PROCEDURE — 3079F PR MOST RECENT DIASTOLIC BLOOD PRESSURE 80-89 MM HG: ICD-10-PCS | Mod: CPTII,S$GLB,, | Performed by: PSYCHIATRY & NEUROLOGY

## 2022-08-05 PROCEDURE — 1159F PR MEDICATION LIST DOCUMENTED IN MEDICAL RECORD: ICD-10-PCS | Mod: CPTII,S$GLB,, | Performed by: PSYCHIATRY & NEUROLOGY

## 2022-08-05 PROCEDURE — 99999 PR PBB SHADOW E&M-EST. PATIENT-LVL III: CPT | Mod: PBBFAC,,, | Performed by: PSYCHIATRY & NEUROLOGY

## 2022-08-05 PROCEDURE — 3079F DIAST BP 80-89 MM HG: CPT | Mod: CPTII,S$GLB,, | Performed by: PSYCHIATRY & NEUROLOGY

## 2022-08-05 PROCEDURE — 99213 PR OFFICE/OUTPT VISIT, EST, LEVL III, 20-29 MIN: ICD-10-PCS | Mod: S$GLB,,, | Performed by: PSYCHIATRY & NEUROLOGY

## 2022-08-05 NOTE — PROGRESS NOTES
Outpatient Psychiatry Follow-Up Visit (MD/NP)    8/5/2022    Clinical Status of Patient:  Outpatient (Ambulatory)    Chief Complaint:  Olvin Posey is a 22 y.o. male who presents today for follow-up of mood disorder, anxiety and psychosis.  Met with patient.      Interval History and Content of Current Session:  Interim Events/Subjective Report/Content of Current Session: Patient Olvin Posey presents to clinic for follow up.  Says that he is back to exercising and jogging.  He is keeping up with friends but does play a lot of video games late into the night.  No depression or anxiety.  No mood swings or psychosis.  Stopped taking buspirone.  Father visited him and it went well.  Maybe feels a little less engaged than he should be but doing OK.  No brittanie.  Sleeping well at 8-10 hours at night.  Asking to remain off medications.  Does not feel that he need inpatient psychiatric admission.    Psychotherapy:  · Target symptoms: anxiety , mood disorder, psychosis  · Why chosen therapy is appropriate versus another modality: relevant to diagnosis  · Outcome monitoring methods: self-report, observation  · Therapeutic intervention type: supportive psychotherapy  · Topics discussed/themes: building skills sets for symptom management, symptom recognition  · The patient's response to the intervention is accepting. The patient's progress toward treatment goals is fair.   · Duration of intervention: 15 minutes.    Review of Systems   · PSYCHIATRIC: Pertinant items are noted in the narrative.  · CONSTITUTIONAL: No weight gain or loss.   · MUSCULOSKELETAL: No pain or stiffness of the joints.  · NEUROLOGIC: No weakness, sensory changes, seizures, confusion, memory loss, tremor or other abnormal movements.  · RESPIRATORY: No shortness of breath.  · CARDIOVASCULAR: No tachycardia or chest pain.  · GASTROINTESTINAL: No nausea, vomiting, pain, constipation or diarrhea.    Past Medical, Family and Social History: The  "patient's past medical, family and social history have been reviewed and updated as appropriate within the electronic medical record - see encounter notes.    Compliance: yes    Side effects: None    Risk Parameters:  Patient reports no suicidal ideation  Patient reports no homicidal ideation  Patient reports no self-injurious behavior  Patient reports no violent behavior    Exam (detailed: at least 9 elements; comprehensive: all 15 elements)   Constitutional  Vitals:  Most recent vital signs, dated less than 90 days prior to this appointment, were reviewed.   Vitals:    08/05/22 1124   BP: 135/82   Pulse: 76   Weight: 104.4 kg (230 lb 2.6 oz)   Height: 6' 4" (1.93 m)        General:  unremarkable, age appropriate     Musculoskeletal  Muscle Strength/Tone:  no tremor, no tic   Gait & Station:  non-ataxic     Psychiatric  Speech:  no latency; no press   Mood & Affect:  steady  blunted   Thought Process:  normal and logical   Associations:  intact   Thought Content:  normal, no suicidality, no homicidality, delusions, or paranoia   Insight:  has awareness of illness   Judgement: behavior is adequate to circumstances   Orientation:  person, place, situation, time/date   Memory: intact for content of interview   Language: able to name, able to repeat   Attention Span & Concentration:  able to focus   Fund of Knowledge:  intact and appropriate to age and level of education     Assessment and Diagnosis   Status/Progress: Based on the examination today, the patient's problem(s) is/are adequately but not ideally controlled.  New problems have been presented today.   Co-morbidities are complicating management of the primary condition.  There are no active rule-out diagnoses for this patient at this time.     General Impression: We will continue pharmacological intervention and adjunctive therapy.       ICD-10-CM ICD-9-CM   1. Major depressive disorder, recurrent episode, in partial remission  F33.41 296.35   2. Social " anxiety disorder  F40.10 300.23       Intervention/Counseling/Treatment Plan   · Medication Management: Continue current medications. The risks and benefits of medication were discussed with the patient.  · Counseling provided with patient as follows: importance of compliance with chosen treatment options was emphasized, risks and benefits of treatment options, including medications, were discussed with the patient, risk factor reduction, prognosis, patient education, instructions for  management, treatment and follow-up were reviewed  1.  stop benztropine.  2.  Stop escitalopram.  3.  Stop risperidone.  4.  stop buspirone.  5.  Continue with therapy.  6.  Worry that this is presenting like more of a psychotic episode with prior underlying paranoia.  Not sure if this was MDD with a psychotic episode.  May have been due to sleep depravation.   7.  May consider Wellbutrin in the future for motivation.     Return to Clinic: as needed, 1 year

## 2022-08-05 NOTE — PATIENT INSTRUCTIONS

## 2022-11-01 ENCOUNTER — TELEPHONE (OUTPATIENT)
Dept: PSYCHIATRY | Facility: CLINIC | Age: 22
End: 2022-11-01
Payer: COMMERCIAL

## 2022-11-01 NOTE — TELEPHONE ENCOUNTER
Pts father called office, lvm and stated pty is refusing to come to appt made tomorrow and requested to cx. Pt's father stated he will call office later to r/s.

## 2022-11-14 ENCOUNTER — TELEPHONE (OUTPATIENT)
Dept: PSYCHIATRY | Facility: CLINIC | Age: 22
End: 2022-11-14
Payer: COMMERCIAL

## 2022-11-14 NOTE — TELEPHONE ENCOUNTER
Called Randy back and advised the below. He then  asked me to clarify with Dr. Bates if this is an insurance only issue and if not he is willing to pay out of pocket. Made him aware it seems like this is a legally technicality as well however I will ask. He the  asked if he can be referred to someone else who can  them.    Per Dr. Bates verbally: he legally can not see the parents w/o pt present, and he needs to call insurance company to see who he can see.     Called pt and made aware of what Dr. Bates said, he stated he understands but will most likely call within the week to speak with Dr. Bates directly.         Per Dr. Baets:     MD Lissy Hargrove MA  Caller: Unspecified (Today,  9:18 AM)  It is very unfortunate, but insurance companies mandate that patient has to be present for appointments.  I am not able to see them without Olvin present.     If they feel that he is getting psychotic again, they can call the 's office in the [place] where Olvin lives.  The 's office can send someone to his home to evaluate him and see if he needs inpatient psychiatric hospitalization.     The other thing that they can do is to bring him to an ED where he can be evaluated by a physician.

## 2022-11-14 NOTE — TELEPHONE ENCOUNTER
"Pts father, Randy called office and [facility] to have a call back. Returned Randy's call, he reports pt is being very stubborn and is unwilling to come to appts w/ Dr. Bates. He also reports pt is having "magical thinking" and believes nothing is wrong with him. Randy asked if they can keep appt on 12, but pt will not be coming in. He wants to see if his wife and himself can come in to get some advice from Dr. Bates on pot and further discuss his spiraling.    Made Randy aware I will have to ask Dr. Bates regarding this, and call will be returned once I get an update.    Randy verbally acknowledged.   "

## 2023-02-23 ENCOUNTER — PATIENT MESSAGE (OUTPATIENT)
Dept: PSYCHIATRY | Facility: CLINIC | Age: 23
End: 2023-02-23
Payer: COMMERCIAL

## 2023-06-01 ENCOUNTER — PATIENT MESSAGE (OUTPATIENT)
Dept: INTERNAL MEDICINE | Facility: CLINIC | Age: 23
End: 2023-06-01
Payer: COMMERCIAL

## 2023-06-07 NOTE — PROGRESS NOTES
"  Subjective:       Olvin Posey is a 23 y.o. male who is a new patient who was self-referred  for evaluation of testicular lumps.      He reports occasional painful L testicular nodules and painless R testicular nodule. No recent imaging. Was referred to urologist in 2018 for "trace b/l hydrocele" on CHANDANA.    He reports injury to L testicle after straining during exercise. Pain has improved but still feels two bumps. Also with R paratesticular bump, not painful. Lumps on L can change in size depended on exercise. No further pain.     CHANDANA 2/2018 - trace b/l hydrocele, no testicular mass    The following portions of the patient's history were reviewed and updated as appropriate: allergies, current medications, past family history, past medical history, past social history, past surgical history and problem list.    Review of Systems  12 point review of systems completed. Pertinent positive and negatives listed in HPI        Objective:    Vitals: Wt 99.8 kg (220 lb 0.3 oz)   BMI 26.78 kg/m²     Physical Exam   General: well developed, well nourished in no acute distress  Head: normocephalic, atraumatic  Neck: supple, trachea midline, no obvious enlargement of thyroid  HEENT: EOMI, mucus membranes moist, sclera anicteric, no hearing impairment  Lungs: symmetric expansion, non-labored breathing  Skin: no rashes or lesions  Neuro: alert and oriented x 3, no gross deficits  Psych: normal judgment and insight, normal mood/affect and non-anxious  Genitourinary: Circumcised penis without lesion. B/l testes descended, symmetric.No testicular nodule. No significant palpable abnormality on epididymides. No varicocele, hydrocele. Nontender. No inguinal hernia noted.       Lab Review   Urine analysis today in clinic shows negative for all components     Lab Results   Component Value Date    WBC 8.67 12/20/2021    HGB 16.5 12/20/2021    HCT 47.4 12/20/2021    MCV 85 12/20/2021     12/20/2021     Lab Results "   Component Value Date    CREATININE 0.9 12/29/2021    BUN 14 12/29/2021       Imaging  Images and reports were personally reviewed by me and discussed with patient  CHANDANA reviewed       Assessment/Plan:      1. Epididymal pain /  mass   - No concerning finding on exam   - Recommend CHANDANA   - Worsened by exercise - no hernia/varicocele on exam     2. Testicular pain, left    - No testicular mass palpated   - CHANDANA         Follow up in 2-3 weeks

## 2023-06-12 ENCOUNTER — OFFICE VISIT (OUTPATIENT)
Dept: UROLOGY | Facility: CLINIC | Age: 23
End: 2023-06-12
Payer: COMMERCIAL

## 2023-06-12 ENCOUNTER — HOSPITAL ENCOUNTER (OUTPATIENT)
Dept: RADIOLOGY | Facility: HOSPITAL | Age: 23
Discharge: HOME | End: 2023-06-12
Attending: UROLOGY
Payer: COMMERCIAL

## 2023-06-12 VITALS — WEIGHT: 220 LBS | BODY MASS INDEX: 26.78 KG/M2

## 2023-06-12 DIAGNOSIS — N50.812 TESTICULAR PAIN, LEFT: ICD-10-CM

## 2023-06-12 DIAGNOSIS — N50.819 EPIDIDYMAL PAIN: Primary | ICD-10-CM

## 2023-06-12 PROCEDURE — 99999 PR PBB SHADOW E&M-EST. PATIENT-LVL III: CPT | Mod: PBBFAC,,, | Performed by: UROLOGY

## 2023-06-12 PROCEDURE — 3008F PR BODY MASS INDEX (BMI) DOCUMENTED: ICD-10-PCS | Mod: CPTII,S$GLB,, | Performed by: UROLOGY

## 2023-06-12 PROCEDURE — 99204 PR OFFICE/OUTPT VISIT, NEW, LEVL IV, 45-59 MIN: ICD-10-PCS | Mod: S$GLB,,, | Performed by: UROLOGY

## 2023-06-12 PROCEDURE — 3008F BODY MASS INDEX DOCD: CPT | Mod: CPTII,S$GLB,, | Performed by: UROLOGY

## 2023-06-12 PROCEDURE — 1160F RVW MEDS BY RX/DR IN RCRD: CPT | Mod: CPTII,S$GLB,, | Performed by: UROLOGY

## 2023-06-12 PROCEDURE — 76870 US SCROTUM AND TESTICLES: ICD-10-PCS | Mod: 26,,, | Performed by: RADIOLOGY

## 2023-06-12 PROCEDURE — 99204 OFFICE O/P NEW MOD 45 MIN: CPT | Mod: S$GLB,,, | Performed by: UROLOGY

## 2023-06-12 PROCEDURE — 1159F MED LIST DOCD IN RCRD: CPT | Mod: CPTII,S$GLB,, | Performed by: UROLOGY

## 2023-06-12 PROCEDURE — 76870 US EXAM SCROTUM: CPT | Mod: TC

## 2023-06-12 PROCEDURE — 76870 US EXAM SCROTUM: CPT | Mod: 26,,, | Performed by: RADIOLOGY

## 2023-06-12 PROCEDURE — 1159F PR MEDICATION LIST DOCUMENTED IN MEDICAL RECORD: ICD-10-PCS | Mod: CPTII,S$GLB,, | Performed by: UROLOGY

## 2023-06-12 PROCEDURE — 99999 PR PBB SHADOW E&M-EST. PATIENT-LVL III: ICD-10-PCS | Mod: PBBFAC,,, | Performed by: UROLOGY

## 2023-06-12 PROCEDURE — 1160F PR REVIEW ALL MEDS BY PRESCRIBER/CLIN PHARMACIST DOCUMENTED: ICD-10-PCS | Mod: CPTII,S$GLB,, | Performed by: UROLOGY

## 2023-07-17 ENCOUNTER — OFFICE VISIT (OUTPATIENT)
Dept: UROLOGY | Facility: CLINIC | Age: 23
End: 2023-07-17
Payer: COMMERCIAL

## 2023-07-17 VITALS — BODY MASS INDEX: 27.16 KG/M2 | WEIGHT: 223.13 LBS

## 2023-07-17 DIAGNOSIS — I86.1 LEFT VARICOCELE: ICD-10-CM

## 2023-07-17 DIAGNOSIS — N50.812 TESTICULAR PAIN, LEFT: Primary | ICD-10-CM

## 2023-07-17 DIAGNOSIS — N50.819 EPIDIDYMAL PAIN: ICD-10-CM

## 2023-07-17 PROCEDURE — 99214 PR OFFICE/OUTPT VISIT, EST, LEVL IV, 30-39 MIN: ICD-10-PCS | Mod: S$GLB,,, | Performed by: UROLOGY

## 2023-07-17 PROCEDURE — 99999 PR PBB SHADOW E&M-EST. PATIENT-LVL II: CPT | Mod: PBBFAC,,, | Performed by: UROLOGY

## 2023-07-17 PROCEDURE — 1160F PR REVIEW ALL MEDS BY PRESCRIBER/CLIN PHARMACIST DOCUMENTED: ICD-10-PCS | Mod: CPTII,S$GLB,, | Performed by: UROLOGY

## 2023-07-17 PROCEDURE — 1159F MED LIST DOCD IN RCRD: CPT | Mod: CPTII,S$GLB,, | Performed by: UROLOGY

## 2023-07-17 PROCEDURE — 1160F RVW MEDS BY RX/DR IN RCRD: CPT | Mod: CPTII,S$GLB,, | Performed by: UROLOGY

## 2023-07-17 PROCEDURE — 99214 OFFICE O/P EST MOD 30 MIN: CPT | Mod: S$GLB,,, | Performed by: UROLOGY

## 2023-07-17 PROCEDURE — 1159F PR MEDICATION LIST DOCUMENTED IN MEDICAL RECORD: ICD-10-PCS | Mod: CPTII,S$GLB,, | Performed by: UROLOGY

## 2023-07-17 PROCEDURE — 3008F BODY MASS INDEX DOCD: CPT | Mod: CPTII,S$GLB,, | Performed by: UROLOGY

## 2023-07-17 PROCEDURE — 3008F PR BODY MASS INDEX (BMI) DOCUMENTED: ICD-10-PCS | Mod: CPTII,S$GLB,, | Performed by: UROLOGY

## 2023-07-17 PROCEDURE — 99999 PR PBB SHADOW E&M-EST. PATIENT-LVL II: ICD-10-PCS | Mod: PBBFAC,,, | Performed by: UROLOGY

## 2023-07-17 NOTE — PROGRESS NOTES
"  Subjective:       Olvin Posey is a 23 y.o. male who is an established patient who was self-referred  for evaluation of testicular lumps.      He reports occasional painful L testicular nodules and painless R testicular nodule. No recent imaging. Was referred to urologist in 2018 for "trace b/l hydrocele" on CHANDANA.    He reports injury to L testicle after straining during exercise. Pain has improved but still feels two bumps. Also with R paratesticular bump, not painful. Lumps on L can change in size depended on exercise. No further pain.     CHANDANA 2/2018 - trace b/l hydrocele, no testicular mass    CHANDANA 6/2023 - trace b/l hydrocele, small L varicocele, no testicular mass, 1.3cm x 0.2cm R extratesticular fluid collection in subQ soft tissue.     The following portions of the patient's history were reviewed and updated as appropriate: allergies, current medications, past family history, past medical history, past social history, past surgical history and problem list.    Review of Systems  12 point review of systems completed. Pertinent positive and negatives listed in HPI        Objective:    Vitals: Wt 101.2 kg (223 lb 1.7 oz)   BMI 27.16 kg/m²     Physical Exam   General: well developed, well nourished in no acute distress  Head: normocephalic, atraumatic  Neck: supple, trachea midline, no obvious enlargement of thyroid  HEENT: EOMI, mucus membranes moist, sclera anicteric, no hearing impairment  Lungs: symmetric expansion, non-labored breathing  Skin: no rashes or lesions  Neuro: alert and oriented x 3, no gross deficits  Psych: normal judgment and insight, normal mood/affect and non-anxious  Genitourinary: Circumcised penis without lesion. B/l testes descended, symmetric. No testicular nodule. No significant palpable abnormality on epididymides. No varicocele, hydrocele. Nontender. No inguinal hernia noted. Sebaceous cyst in L superior scrotum. No subcutaneous fluid collection palpated.       Lab Review "   Urine analysis today in clinic shows - no urine    Lab Results   Component Value Date    WBC 8.67 12/20/2021    HGB 16.5 12/20/2021    HCT 47.4 12/20/2021    MCV 85 12/20/2021     12/20/2021     Lab Results   Component Value Date    CREATININE 0.9 12/29/2021    BUN 14 12/29/2021       Imaging  Images and reports were personally reviewed by me and discussed with patient  CHANDANA reviewed       Assessment/Plan:      1. Epididymal pain /  mass   - No concerning finding on exam   - CHANDANA reviewed   - Worsened by exercise - no hernia/varicocele on exam. No clinically significant finding on exam. No subQ fluid collection noted.      2. Testicular pain, left    - No testicular mass palpated   - CHANDANA - no mass, small L varicocele. Reviewed pathophysiology.          Follow up in PRN

## 2024-01-18 ENCOUNTER — PATIENT OUTREACH (OUTPATIENT)
Dept: ADMINISTRATIVE | Facility: HOSPITAL | Age: 24
End: 2024-01-18
Payer: COMMERCIAL

## 2024-01-18 NOTE — PROGRESS NOTES
PCP VISIT > 12 MONTHS: per chart review pt is overdue for annual visit, pt moved to [place], does not have a specific PCP at this time.